# Patient Record
Sex: MALE | Race: WHITE | Employment: OTHER | URBAN - METROPOLITAN AREA
[De-identification: names, ages, dates, MRNs, and addresses within clinical notes are randomized per-mention and may not be internally consistent; named-entity substitution may affect disease eponyms.]

---

## 2017-02-27 ENCOUNTER — TELEPHONE (OUTPATIENT)
Dept: INTERNAL MEDICINE CLINIC | Age: 57
End: 2017-02-27

## 2017-03-01 RX ORDER — ATORVASTATIN CALCIUM 40 MG/1
TABLET, FILM COATED ORAL
Qty: 30 TABLET | Refills: 5 | Status: SHIPPED | OUTPATIENT
Start: 2017-03-01 | End: 2017-08-26 | Stop reason: SDUPTHER

## 2017-03-01 RX ORDER — ATORVASTATIN CALCIUM 40 MG/1
TABLET, FILM COATED ORAL
Qty: 30 TABLET | Refills: 9 | OUTPATIENT
Start: 2017-03-01

## 2017-03-12 RX ORDER — CLOPIDOGREL BISULFATE 75 MG/1
TABLET ORAL
Qty: 30 TABLET | Refills: 2 | Status: SHIPPED | OUTPATIENT
Start: 2017-03-12 | End: 2017-03-16 | Stop reason: SDUPTHER

## 2017-03-20 ENCOUNTER — OFFICE VISIT (OUTPATIENT)
Dept: INTERNAL MEDICINE CLINIC | Age: 57
End: 2017-03-20

## 2017-03-20 VITALS
RESPIRATION RATE: 16 BRPM | DIASTOLIC BLOOD PRESSURE: 74 MMHG | OXYGEN SATURATION: 96 % | HEIGHT: 69 IN | WEIGHT: 190 LBS | SYSTOLIC BLOOD PRESSURE: 106 MMHG | TEMPERATURE: 98.2 F | HEART RATE: 89 BPM | BODY MASS INDEX: 28.14 KG/M2

## 2017-03-20 DIAGNOSIS — M72.2 PLANTAR FASCIITIS, BILATERAL: ICD-10-CM

## 2017-03-20 DIAGNOSIS — J44.9 CHRONIC OBSTRUCTIVE PULMONARY DISEASE, UNSPECIFIED COPD TYPE (HCC): Primary | ICD-10-CM

## 2017-03-20 PROCEDURE — 99213 OFFICE O/P EST LOW 20 MIN: CPT | Performed by: FAMILY MEDICINE

## 2017-03-20 RX ORDER — CLOPIDOGREL BISULFATE 75 MG/1
TABLET ORAL
Qty: 30 TABLET | Refills: 5 | Status: SHIPPED | OUTPATIENT
Start: 2017-03-20 | End: 2017-11-30 | Stop reason: SDUPTHER

## 2017-03-20 ASSESSMENT — ENCOUNTER SYMPTOMS: SHORTNESS OF BREATH: 0

## 2017-03-22 ENCOUNTER — HOSPITAL ENCOUNTER (OUTPATIENT)
Dept: PULMONOLOGY | Age: 57
Discharge: HOME | End: 2017-03-22
Attending: FAMILY MEDICINE | Admitting: FAMILY MEDICINE

## 2017-03-22 DIAGNOSIS — J41.0 SIMPLE CHRONIC BRONCHITIS (HCC): ICD-10-CM

## 2017-03-22 RX ORDER — ALBUTEROL SULFATE 90 UG/1
4 AEROSOL, METERED RESPIRATORY (INHALATION) ONCE
Status: COMPLETED | OUTPATIENT
Start: 2017-03-22 | End: 2017-03-22

## 2017-03-22 RX ADMIN — ALBUTEROL SULFATE 4 PUFF: 90 AEROSOL, METERED RESPIRATORY (INHALATION) at 15:56

## 2017-06-20 ENCOUNTER — OFFICE VISIT (OUTPATIENT)
Dept: INTERNAL MEDICINE CLINIC | Age: 57
End: 2017-06-20

## 2017-06-20 VITALS
DIASTOLIC BLOOD PRESSURE: 68 MMHG | BODY MASS INDEX: 27.77 KG/M2 | HEIGHT: 70 IN | HEART RATE: 101 BPM | SYSTOLIC BLOOD PRESSURE: 102 MMHG | OXYGEN SATURATION: 96 % | TEMPERATURE: 98.3 F | RESPIRATION RATE: 18 BRPM | WEIGHT: 194 LBS

## 2017-06-20 DIAGNOSIS — L73.9 FOLLICULITIS: Primary | ICD-10-CM

## 2017-06-20 DIAGNOSIS — J42 CHRONIC BRONCHITIS, UNSPECIFIED CHRONIC BRONCHITIS TYPE (HCC): ICD-10-CM

## 2017-06-20 PROCEDURE — 99213 OFFICE O/P EST LOW 20 MIN: CPT | Performed by: FAMILY MEDICINE

## 2017-06-20 RX ORDER — CLINDAMYCIN HYDROCHLORIDE 300 MG/1
300 CAPSULE ORAL 3 TIMES DAILY
Qty: 30 CAPSULE | Refills: 0 | Status: SHIPPED | OUTPATIENT
Start: 2017-06-20 | End: 2017-06-30

## 2017-06-20 ASSESSMENT — ENCOUNTER SYMPTOMS: SHORTNESS OF BREATH: 0

## 2017-06-28 DIAGNOSIS — H69.81 EUSTACHIAN TUBE DYSFUNCTION, RIGHT: ICD-10-CM

## 2017-06-29 RX ORDER — CETIRIZINE HYDROCHLORIDE 10 MG/1
10 TABLET ORAL DAILY
Qty: 30 TABLET | Refills: 5 | Status: SHIPPED | OUTPATIENT
Start: 2017-06-29 | End: 2018-01-19 | Stop reason: SDUPTHER

## 2017-07-07 ENCOUNTER — TELEPHONE (OUTPATIENT)
Dept: INTERNAL MEDICINE CLINIC | Age: 57
End: 2017-07-07

## 2017-07-07 NOTE — TELEPHONE ENCOUNTER
Pharmacy is calling regarding the Prior Auth. On this medicationTiotropium Bromide-Olodaterol (STIOLTO RESPIMAT) 2.5-2.5 MCG/ACT AERS . They are just calling for a update.

## 2017-08-10 DIAGNOSIS — I10 ESSENTIAL HYPERTENSION: ICD-10-CM

## 2017-08-10 RX ORDER — METOPROLOL SUCCINATE 25 MG/1
TABLET, EXTENDED RELEASE ORAL
Qty: 90 TABLET | Refills: 1 | Status: SHIPPED | OUTPATIENT
Start: 2017-08-10 | End: 2018-05-11 | Stop reason: SDUPTHER

## 2017-08-29 ENCOUNTER — TELEPHONE (OUTPATIENT)
Dept: INTERNAL MEDICINE CLINIC | Age: 57
End: 2017-08-29

## 2017-08-29 DIAGNOSIS — K21.9 GASTROESOPHAGEAL REFLUX DISEASE WITHOUT ESOPHAGITIS: Primary | ICD-10-CM

## 2017-08-29 RX ORDER — ATORVASTATIN CALCIUM 40 MG/1
TABLET, FILM COATED ORAL
Qty: 30 TABLET | Refills: 4 | Status: SHIPPED | OUTPATIENT
Start: 2017-08-29 | End: 2018-01-22 | Stop reason: SDUPTHER

## 2017-08-29 RX ORDER — OMEPRAZOLE 20 MG/1
20 CAPSULE, DELAYED RELEASE ORAL DAILY
Qty: 30 CAPSULE | Refills: 2 | Status: SHIPPED | OUTPATIENT
Start: 2017-08-29 | End: 2017-11-23 | Stop reason: SDUPTHER

## 2017-09-25 RX ORDER — TRAZODONE HYDROCHLORIDE 150 MG/1
TABLET ORAL
Qty: 30 TABLET | Refills: 0 | Status: SHIPPED | OUTPATIENT
Start: 2017-09-25 | End: 2017-10-23 | Stop reason: SDUPTHER

## 2017-09-28 ENCOUNTER — OFFICE VISIT (OUTPATIENT)
Dept: INTERNAL MEDICINE CLINIC | Age: 57
End: 2017-09-28

## 2017-09-28 VITALS
HEART RATE: 82 BPM | WEIGHT: 188 LBS | DIASTOLIC BLOOD PRESSURE: 76 MMHG | OXYGEN SATURATION: 98 % | TEMPERATURE: 97.9 F | HEIGHT: 69 IN | BODY MASS INDEX: 27.85 KG/M2 | SYSTOLIC BLOOD PRESSURE: 110 MMHG | RESPIRATION RATE: 18 BRPM

## 2017-09-28 DIAGNOSIS — J44.9 CHRONIC OBSTRUCTIVE PULMONARY DISEASE, UNSPECIFIED COPD TYPE (HCC): Primary | ICD-10-CM

## 2017-09-28 DIAGNOSIS — L40.9 PSORIASIS (A TYPE OF SKIN INFLAMMATION): ICD-10-CM

## 2017-09-28 DIAGNOSIS — Z23 NEED FOR PNEUMOCOCCAL VACCINATION: ICD-10-CM

## 2017-09-28 DIAGNOSIS — Z23 NEED FOR INFLUENZA VACCINATION: ICD-10-CM

## 2017-09-28 DIAGNOSIS — M79.671 PAIN OF RIGHT HEEL: ICD-10-CM

## 2017-09-28 DIAGNOSIS — F41.1 GAD (GENERALIZED ANXIETY DISORDER): ICD-10-CM

## 2017-09-28 PROCEDURE — 99214 OFFICE O/P EST MOD 30 MIN: CPT | Performed by: FAMILY MEDICINE

## 2017-09-28 RX ORDER — ALBUTEROL SULFATE 90 UG/1
2 AEROSOL, METERED RESPIRATORY (INHALATION) EVERY 4 HOURS PRN
Qty: 1 INHALER | Refills: 11 | Status: SHIPPED | OUTPATIENT
Start: 2017-09-28 | End: 2018-05-11 | Stop reason: SDUPTHER

## 2017-09-28 RX ORDER — PAROXETINE 10 MG/1
10 TABLET, FILM COATED ORAL DAILY
Qty: 30 TABLET | Refills: 1 | Status: SHIPPED | OUTPATIENT
Start: 2017-09-28 | End: 2017-10-30 | Stop reason: SDUPTHER

## 2017-09-28 RX ORDER — EMOLLIENT COMBINATION NO.97
OINTMENT (GRAM) TOPICAL
Qty: 454 G | Refills: 5 | Status: SHIPPED | OUTPATIENT
Start: 2017-09-28 | End: 2021-01-12 | Stop reason: SDUPTHER

## 2017-09-28 ASSESSMENT — ENCOUNTER SYMPTOMS: SHORTNESS OF BREATH: 0

## 2017-10-06 ENCOUNTER — TELEPHONE (OUTPATIENT)
Dept: INTERNAL MEDICINE CLINIC | Age: 57
End: 2017-10-06

## 2017-10-12 ENCOUNTER — OFFICE VISIT (OUTPATIENT)
Dept: ORTHOPEDIC SURGERY | Age: 57
End: 2017-10-12

## 2017-10-12 VITALS
HEIGHT: 69 IN | DIASTOLIC BLOOD PRESSURE: 79 MMHG | SYSTOLIC BLOOD PRESSURE: 119 MMHG | HEART RATE: 85 BPM | BODY MASS INDEX: 27.85 KG/M2 | WEIGHT: 188.05 LBS

## 2017-10-12 DIAGNOSIS — M72.2 PLANTAR FASCIITIS OF RIGHT FOOT: ICD-10-CM

## 2017-10-12 DIAGNOSIS — M79.671 PAIN OF RIGHT HEEL: Primary | ICD-10-CM

## 2017-10-12 PROCEDURE — 99203 OFFICE O/P NEW LOW 30 MIN: CPT | Performed by: PODIATRIST

## 2017-10-12 PROCEDURE — 73650 X-RAY EXAM OF HEEL: CPT | Performed by: PODIATRIST

## 2017-10-12 PROCEDURE — L3040 FT ARCH SUPRT PREMOLD LONGIT: HCPCS | Performed by: PODIATRIST

## 2017-10-12 RX ORDER — PREDNISONE 10 MG/1
TABLET ORAL
Qty: 26 TABLET | Refills: 0 | Status: SHIPPED | OUTPATIENT
Start: 2017-10-12 | End: 2017-12-19 | Stop reason: ALTCHOICE

## 2017-10-12 NOTE — PROGRESS NOTES
We discussed the appropriate use of them. I advised the patient to use them full time in a supportive shoe that will fit them. Stretching and a short course of prednisone 10 mg on a tapered dose was prescribed. We discussed the potential adverse reactions of this medication. We discussed several different treatment options. The patient agrees with the treatment plan. I will see the patient in 3 weeks for reevaluation. Procedures    Powerstep Protech Full Length Insert     Patient was prescribed Powerstep Protech Full Length Inserts. The bilateral FEET will require stabilization / support from this semi-rigid / rigid orthosis to improve their function. The orthosis will assist in protecting the affected area, provide functional support and facilitate healing. The patient was educated and fit by a healthcare professional with expert knowledge and specialization in brace application while under the direct supervision of the treating physician. Verbal and written instructions for the use of and application of this item were provided. They were instructed to contact the office immediately should the brace result in increased pain, decreased sensation, increased swelling or worsening of the condition.

## 2017-10-30 ENCOUNTER — OFFICE VISIT (OUTPATIENT)
Dept: INTERNAL MEDICINE CLINIC | Age: 57
End: 2017-10-30

## 2017-10-30 VITALS
DIASTOLIC BLOOD PRESSURE: 70 MMHG | TEMPERATURE: 97.8 F | SYSTOLIC BLOOD PRESSURE: 120 MMHG | RESPIRATION RATE: 18 BRPM | OXYGEN SATURATION: 97 % | BODY MASS INDEX: 27.55 KG/M2 | HEART RATE: 69 BPM | HEIGHT: 69 IN | WEIGHT: 186 LBS

## 2017-10-30 DIAGNOSIS — F41.1 GAD (GENERALIZED ANXIETY DISORDER): Primary | ICD-10-CM

## 2017-10-30 PROCEDURE — G8427 DOCREV CUR MEDS BY ELIG CLIN: HCPCS | Performed by: FAMILY MEDICINE

## 2017-10-30 PROCEDURE — G8417 CALC BMI ABV UP PARAM F/U: HCPCS | Performed by: FAMILY MEDICINE

## 2017-10-30 PROCEDURE — 99213 OFFICE O/P EST LOW 20 MIN: CPT | Performed by: FAMILY MEDICINE

## 2017-10-30 PROCEDURE — 4004F PT TOBACCO SCREEN RCVD TLK: CPT | Performed by: FAMILY MEDICINE

## 2017-10-30 PROCEDURE — G8598 ASA/ANTIPLAT THER USED: HCPCS | Performed by: FAMILY MEDICINE

## 2017-10-30 PROCEDURE — G8484 FLU IMMUNIZE NO ADMIN: HCPCS | Performed by: FAMILY MEDICINE

## 2017-10-30 PROCEDURE — 3017F COLORECTAL CA SCREEN DOC REV: CPT | Performed by: FAMILY MEDICINE

## 2017-10-30 RX ORDER — PAROXETINE 30 MG/1
30 TABLET, FILM COATED ORAL DAILY
Qty: 30 TABLET | Refills: 1 | Status: SHIPPED | OUTPATIENT
Start: 2017-10-30 | End: 2017-12-26 | Stop reason: ALTCHOICE

## 2017-10-30 NOTE — PATIENT INSTRUCTIONS

## 2017-11-21 ENCOUNTER — OFFICE VISIT (OUTPATIENT)
Dept: INTERNAL MEDICINE CLINIC | Age: 57
End: 2017-11-21

## 2017-11-21 VITALS
DIASTOLIC BLOOD PRESSURE: 80 MMHG | SYSTOLIC BLOOD PRESSURE: 110 MMHG | HEART RATE: 78 BPM | WEIGHT: 189 LBS | RESPIRATION RATE: 20 BRPM | TEMPERATURE: 97.7 F | BODY MASS INDEX: 27.99 KG/M2 | HEIGHT: 69 IN | OXYGEN SATURATION: 98 %

## 2017-11-21 DIAGNOSIS — G47.00 INSOMNIA, UNSPECIFIED TYPE: ICD-10-CM

## 2017-11-21 DIAGNOSIS — L85.3 DRY SKIN DERMATITIS: ICD-10-CM

## 2017-11-21 DIAGNOSIS — F41.9 ANXIETY: Primary | ICD-10-CM

## 2017-11-21 PROCEDURE — 99214 OFFICE O/P EST MOD 30 MIN: CPT | Performed by: FAMILY MEDICINE

## 2017-11-21 PROCEDURE — 3017F COLORECTAL CA SCREEN DOC REV: CPT | Performed by: FAMILY MEDICINE

## 2017-11-21 PROCEDURE — G8427 DOCREV CUR MEDS BY ELIG CLIN: HCPCS | Performed by: FAMILY MEDICINE

## 2017-11-21 PROCEDURE — G8484 FLU IMMUNIZE NO ADMIN: HCPCS | Performed by: FAMILY MEDICINE

## 2017-11-21 PROCEDURE — G8417 CALC BMI ABV UP PARAM F/U: HCPCS | Performed by: FAMILY MEDICINE

## 2017-11-21 PROCEDURE — G8598 ASA/ANTIPLAT THER USED: HCPCS | Performed by: FAMILY MEDICINE

## 2017-11-21 PROCEDURE — 4004F PT TOBACCO SCREEN RCVD TLK: CPT | Performed by: FAMILY MEDICINE

## 2017-11-21 NOTE — PATIENT INSTRUCTIONS
Patient Education        Learning About Sleeping Well  What does sleeping well mean? Sleeping well means getting enough sleep. How much sleep is enough varies among people. The number of hours you sleep is not as important as how you feel when you wake up. If you do not feel refreshed, you probably need more sleep. Another sign of not getting enough sleep is feeling tired during the day. The average total nightly sleep time is 7½ to 8 hours. Healthy adults may need a little more or a little less than this. Why is getting enough sleep important? Getting enough quality sleep is a basic part of good health. When your sleep suffers, your mood and your thoughts can suffer too. You may find yourself feeling more grumpy or stressed. Not getting enough sleep also can lead to serious problems, including injury, accidents, anxiety, and depression. What might cause poor sleeping? Many things can cause sleep problems, including:  · Stress. Stress can be caused by fear about a single event, such as giving a speech. Or you may have ongoing stress, such as worry about work or school. · Depression, anxiety, and other mental or emotional conditions. · Changes in your sleep habits or surroundings. This includes changes that happen where you sleep, such as noise, light, or sleeping in a different bed. It also includes changes in your sleep pattern, such as having jet lag or working a late shift. · Health problems, such as pain, breathing problems, and restless legs syndrome. · Lack of regular exercise. How can you help yourself? Here are some tips that may help you sleep more soundly and wake up feeling more refreshed. Your sleeping area  · Use your bedroom only for sleeping and sex. A bit of light reading may help you fall asleep. But if it doesn't, do your reading elsewhere in the house. Don't watch TV in bed.   · Be sure your bed is big enough to stretch out comfortably, especially if you have a sleep 11.3  © 2006-2017 [employer]. Care instructions adapted under license by [facility] ([facility]). If you have questions about a medical condition or this instruction, always ask your healthcare professional. [address] any warranty or liability for your use of this information. Patient Education        Learning About Anxiety Disorders  What are anxiety disorders? Anxiety disorders are a type of medical problem. They cause severe anxiety. When you feel anxious, you feel that something bad is about to happen. This feeling interferes with your life. These disorders include:  · Generalized anxiety disorder. You feel worried and stressed about many everyday events and activities. This goes on for several months and disrupts your life on most days. · Panic disorder. You have repeated panic attacks. A panic attack is a sudden, intense fear or anxiety. It may make you feel short of breath. Your heart may pound. · Social anxiety disorder. You feel very anxious about what you will say or do in front of people. For example, you may be scared to talk or eat in public. This problem affects your daily life. · Phobias. You are very scared of a specific object, situation, or activity. For example, you may fear spiders, high places, or small spaces. What are the symptoms? Generalized anxiety disorder  Symptoms may include:  · Feeling worried and stressed about many things almost every day. · Feeling tired or irritable. You may have a hard time concentrating. · Having headaches or muscle aches. · Having a hard time getting to sleep or staying asleep. Panic disorder  You may have repeated panic attacks when there is no reason for feeling afraid. You may change your daily activities because you worry that you will have another attack. Symptoms may include:  · Intense fear, terror, or anxiety. · Trouble breathing or very fast breathing. · Chest pain or tightness.   · A heartbeat that races or is not regular. Social anxiety disorder  Symptoms may include:  · Fear about a social situation, such as eating in front of others or speaking in public. You may worry a lot. Or you may be afraid that something bad will happen. · Anxiety that can cause you to blush, sweat, and feel shaky. · A heartbeat that is faster than normal.  · A hard time focusing. Phobias  Symptoms may include:  · More fear than most people of being around an object, being in a situation, or doing an activity. You might also be stressed about the chance of being around the thing you fear. · Worry about losing control, panicking, fainting, or having physical symptoms like a faster heartbeat when you are around the situation or object. How are these disorders treated? Anxiety disorders can be treated with medicines or counseling. A combination of both may be used. Medicines may include:  · Antidepressants. These may help your symptoms by keeping chemicals in your brain in balance. · Benzodiazepines. These may give you short-term relief of your symptoms. Some people use cognitive-behavioral therapy. A therapist helps you learn to change stressful or bad thoughts into helpful thoughts. Lead a healthy lifestyle  A healthy lifestyle may help you feel better. · Get at least 30 minutes of exercise on most days of the week. Walking is a good choice. · Eat a healthy diet. Include fruits, vegetables, lean proteins, and whole grains in your diet each day. · Try to go to bed at the same time every night. Try for 8 hours of sleep a night. · Find ways to manage stress. Try relaxation exercises. · Avoid alcohol and illegal drugs. Follow-up care is a key part of your treatment and safety. Be sure to make and go to all appointments, and call your doctor if you are having problems. It's also a good idea to know your test results and keep a list of the medicines you take. Where can you learn more?   Go to

## 2017-12-06 RX ORDER — OMEPRAZOLE 20 MG/1
CAPSULE, DELAYED RELEASE ORAL
Qty: 30 CAPSULE | Refills: 5 | Status: SHIPPED | OUTPATIENT
Start: 2017-12-06 | End: 2018-05-11 | Stop reason: SDUPTHER

## 2017-12-11 ENCOUNTER — TELEPHONE (OUTPATIENT)
Dept: INTERNAL MEDICINE CLINIC | Age: 57
End: 2017-12-11

## 2017-12-11 DIAGNOSIS — G89.29 CHRONIC LOW BACK PAIN, UNSPECIFIED BACK PAIN LATERALITY, WITH SCIATICA PRESENCE UNSPECIFIED: Primary | ICD-10-CM

## 2017-12-11 DIAGNOSIS — M54.5 CHRONIC LOW BACK PAIN, UNSPECIFIED BACK PAIN LATERALITY, WITH SCIATICA PRESENCE UNSPECIFIED: Primary | ICD-10-CM

## 2017-12-11 NOTE — TELEPHONE ENCOUNTER
Patient called and stated that his lower back has been bothering him and he would like to know if something can be prescribed.

## 2017-12-15 RX ORDER — TRAMADOL HYDROCHLORIDE 50 MG/1
50 TABLET ORAL EVERY 6 HOURS PRN
Qty: 28 TABLET | Refills: 0 | Status: SHIPPED | OUTPATIENT
Start: 2017-12-15 | End: 2017-12-22

## 2017-12-26 ENCOUNTER — OFFICE VISIT (OUTPATIENT)
Dept: PSYCHIATRY | Age: 57
End: 2017-12-26

## 2017-12-26 VITALS
SYSTOLIC BLOOD PRESSURE: 128 MMHG | HEIGHT: 69 IN | HEART RATE: 84 BPM | BODY MASS INDEX: 28.61 KG/M2 | WEIGHT: 193.2 LBS | DIASTOLIC BLOOD PRESSURE: 82 MMHG

## 2017-12-26 DIAGNOSIS — Z13.1 ENCOUNTER FOR SCREENING FOR DIABETES MELLITUS: Primary | ICD-10-CM

## 2017-12-26 DIAGNOSIS — F32.A DEPRESSION, UNSPECIFIED DEPRESSION TYPE: ICD-10-CM

## 2017-12-26 DIAGNOSIS — F41.9 ANXIETY DISORDER, UNSPECIFIED TYPE: ICD-10-CM

## 2017-12-26 DIAGNOSIS — Z13.220 ENCOUNTER FOR SCREENING FOR LIPID DISORDER: ICD-10-CM

## 2017-12-26 DIAGNOSIS — G31.84 MILD NEUROCOGNITIVE DISORDER: ICD-10-CM

## 2017-12-26 DIAGNOSIS — Z13.1 ENCOUNTER FOR SCREENING FOR DIABETES MELLITUS: ICD-10-CM

## 2017-12-26 LAB
CHOLESTEROL, TOTAL: 104 MG/DL (ref 0–199)
HDLC SERPL-MCNC: 63 MG/DL (ref 40–60)
LDL CHOLESTEROL CALCULATED: 13 MG/DL
TRIGL SERPL-MCNC: 142 MG/DL (ref 0–150)
VLDLC SERPL CALC-MCNC: 28 MG/DL

## 2017-12-26 PROCEDURE — 99214 OFFICE O/P EST MOD 30 MIN: CPT | Performed by: PSYCHIATRY & NEUROLOGY

## 2017-12-26 RX ORDER — PAROXETINE 10 MG/1
TABLET, FILM COATED ORAL
Qty: 21 TABLET | Refills: 0 | Status: SHIPPED | OUTPATIENT
Start: 2017-12-26 | End: 2018-01-25 | Stop reason: ALTCHOICE

## 2017-12-26 RX ORDER — QUETIAPINE FUMARATE 100 MG/1
TABLET, FILM COATED ORAL
Qty: 60 TABLET | Refills: 0 | Status: SHIPPED | OUTPATIENT
Start: 2017-12-26 | End: 2018-01-25 | Stop reason: DRUGHIGH

## 2017-12-26 ASSESSMENT — ANXIETY QUESTIONNAIRES
2. NOT BEING ABLE TO STOP OR CONTROL WORRYING: 3-NEARLY EVERY DAY
5. BEING SO RESTLESS THAT IT IS HARD TO SIT STILL: 3-NEARLY EVERY DAY
4. TROUBLE RELAXING: 3-NEARLY EVERY DAY
7. FEELING AFRAID AS IF SOMETHING AWFUL MIGHT HAPPEN: 2-OVER HALF THE DAYS
1. FEELING NERVOUS, ANXIOUS, OR ON EDGE: 0-NOT AT ALL SURE
6. BECOMING EASILY ANNOYED OR IRRITABLE: 3-NEARLY EVERY DAY
3. WORRYING TOO MUCH ABOUT DIFFERENT THINGS: 3-NEARLY EVERY DAY
GAD7 TOTAL SCORE: 17

## 2017-12-26 ASSESSMENT — PATIENT HEALTH QUESTIONNAIRE - PHQ9
9. THOUGHTS THAT YOU WOULD BE BETTER OFF DEAD, OR OF HURTING YOURSELF: 1
2. FEELING DOWN, DEPRESSED OR HOPELESS: 1
SUM OF ALL RESPONSES TO PHQ QUESTIONS 1-9: 17
SUM OF ALL RESPONSES TO PHQ9 QUESTIONS 1 & 2: 3
5. POOR APPETITE OR OVEREATING: 0
4. FEELING TIRED OR HAVING LITTLE ENERGY: 3
6. FEELING BAD ABOUT YOURSELF - OR THAT YOU ARE A FAILURE OR HAVE LET YOURSELF OR YOUR FAMILY DOWN: 1
1. LITTLE INTEREST OR PLEASURE IN DOING THINGS: 2
10. IF YOU CHECKED OFF ANY PROBLEMS, HOW DIFFICULT HAVE THESE PROBLEMS MADE IT FOR YOU TO DO YOUR WORK, TAKE CARE OF THINGS AT HOME, OR GET ALONG WITH OTHER PEOPLE: 1
7. TROUBLE CONCENTRATING ON THINGS, SUCH AS READING THE NEWSPAPER OR WATCHING TELEVISION: 3
8. MOVING OR SPEAKING SO SLOWLY THAT OTHER PEOPLE COULD HAVE NOTICED. OR THE OPPOSITE, BEING SO FIGETY OR RESTLESS THAT YOU HAVE BEEN MOVING AROUND A LOT MORE THAN USUAL: 3
3. TROUBLE FALLING OR STAYING ASLEEP: 3

## 2017-12-26 NOTE — PATIENT INSTRUCTIONS
1. Stop taking trazodone  2. Start Quetiapine (Seroquel) as follows:      - 1/2 tablet at night for 3 days then        1 tablet at night for 3 days then        1.5 tablet at night for 3 days then        2 tablets at night   3. Stop taking the 30mg paxil tablets  4.  Fill 10mg Paxil tablets from your pharmacy and reduce them as follows:      - 2 tablets at night for 1 week then       1 tablet at night for 1 week, then stop    Outpatient Psychiatric Services:    [facility] ([address]): [address] (various offices): [phone]  [facility] (various offices): [phone]

## 2017-12-26 NOTE — PROGRESS NOTES
cognitive disorder, chronic medical conditions, male. Pt is low risk for future dangerousness to self or others, and is safe for continued outpatient care.     ____________________________________________________________________________    HPI:   Context: Pt is a 57 yo M with hx of depression, anxiety presenting as a referral from Dr. Saeed Jaramillo. Pt has also had a rt hemispheric ischemic CVA involving the MCA and possibly some PCA circulation in 2013 including some rt frontal lobe involvement. associated symptoms:   Main complaint is insomnia. He persistently and chronically will only get about 2 hrs of sleep per night before waking and not being able to sleep. He has a poor description regarding why he can't sleep, but does indicate some racing thoughts (although can't specify what he thinks about). He also expresses sx of excessive talkativeness, restlessness and inability to sit still (\"i'm up and down and constantly moving\"), irritability, poor concentration. He reports low energy, low motivation to do things, anhedonia, depressed mood most days. He worries about his finances, his girlfriend, and being able to control his own moods and behaviors. He occasionally will feel it is better if he wasn't around and he feels to be a burden on others, particularly his girlfriend, but he denies any suicidal plan or intent, no thoughts about wanting to harm himself. He denies grandiosity, hallucinations, hypersexuality, or impulsive spending. modifying factors:   He was a alcoholic for 12 yrs until his CVA in 2013. Has a good relationship with his family and communicates with them often. Main stressor seems to be largely the relationship with his girlfriend. They fight often, and he finds himself impulsively getting angry easily with her. He's had partial relief from trazodone 150mg for sleep, but this no longer works at all so he stopped taking this over 1 month ago.    Paxil has not helped and has not made his symptoms worse. He also had a bowel obstruction at that time that required surgery and resection, struggles with chronic back pain d/t DDD since the 1990s. Timing: He's dealt with the issue of insomnia chronically for 10 yrs.   Duration: it seems his issues worsened after his CVA in 2013, however had some symptoms prior to this likely attention issues, restlessness, and poor sleep    severity: severe    Outside records:   from the chart I'm not seeing notes about the related encounter to his CVA in [facility] and any post-stroke care or neuropsych testing    ROS:   GEN: no fevers, +fatigue HEENT: no headache, no vision or hearing prob, no sore throat CV: no cp, no palpitations, no edema RESP: no dyspnea : no dysuria, no hematuria MSK: no extremity or joint pain GI: no N/V/D Skin: no rashes NEURO: no headache, no numbness/weakness ENDO: no tremors, no wt changes    Past Psychiatric History:   Hosp: none  Diagnoses: depression, anxiety  Med trials: paxil up to 30mg, trazodone 150mg, belsomra (although pt doesn't recall if he took)  Outpt: no prior psych or therapy  NSSI: denies  Suicide Attempts: denies    Past Medical History:   Diagnosis Date    Alcohol abuse, in remission     for two months    Arthritis     rt hip, hands    Asthma     Back pain     Cerebral artery occlusion with cerebral infarction ([address]     CVA (cerebral infarction) 4/28/13    affected L side     Hyperlipidemia     Hypertension     Insomnia     MRSA carrier     very long time ago, more than one year    Paresthesia of left leg     chronic problem    Psoriasis (a type of skin inflammation)     Smoker     1.5 ppd     Past Surgical History:   Procedure Laterality Date    ABDOMEN SURGERY      APPENDECTOMY      BACK SURGERY      maybe a fusion, L4 L5 herniated disc    CHOLECYSTECTOMY, LAPAROSCOPIC  2/13/2015    LAPAROSCOPIC CHOLECYSTECTOMY WITH CHOLANGIOGRAM, LYSIS OF    SMALL INTESTINE SURGERY  4/24/13    for ischemic bowel    TONSILLECTOMY       Social History     Social History    Marital status:      Spouse name: N/A    Number of children: N/A    Years of education: 8     Occupational History    unemployed      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 Reports childhood was good, parents  when he was a baby, but he maintained and good relationship with both of them and still communicates with them frequently. He has 3 brothers, 1 sister - has a good relationship with them     Dropped out of school 10th grade since he was doing poorly, struggled with math, and wanted to seek employment. Family History   Problem Relation Age of Onset    Arthritis Mother     Mental Illness Neg Hx      No Known Allergies  Current Outpatient Prescriptions on File Prior to Visit   Medication Sig Dispense Refill    predniSONE (DELTASONE) 10 MG tablet Take 1 tablet by mouth daily for 10 doses Take 5 pills a day for 2 days then,  4 pills a day for 2 days then,  3 pills a day for 2 days then,  2 pills a day for 2 days then,  1 pill a day for 2 days.  30 tablet 0    cyclobenzaprine (FLEXERIL) 10 MG tablet Take 1 tablet by mouth 3 times daily as needed for Muscle spasms 30 tablet 0    clopidogrel (PLAVIX) 75 MG tablet TAKE ONE TABLET BY MOUTH DAILY 30 tablet 5    omeprazole (PRILOSEC) 20 MG delayed release capsule TAKE ONE CAPSULE BY MOUTH DAILY 30 capsule 5    umeclidinium-vilanterol (ANORO ELLIPTA) 62.5-25 MCG/INH AEPB inhaler Inhale 1 puff into the lungs daily 60 each 5    albuterol sulfate HFA (PROVENTIL HFA) 108 (90 Base) MCG/ACT inhaler Inhale 2 puffs into the lungs every 4 hours as needed for Wheezing or Shortness of Breath (Space out to every 6 hours as symptoms improve) Space out to every 6 hours as symptoms improve. 1 Inhaler 11    Emollient (AQUAPHILIC) OINT Apply daily to dry skin. 454 g 5    atorvastatin (LIPITOR) 40 MG tablet TAKE ONE TABLET BY MOUTH ONCE NIGHTLY 30 tablet 4    metoprolol succinate (TOPROL XL) 25 MG extended release tablet TAKE ONE-HALF TABLET BY MOUTH DAILY 90 tablet 1    cetirizine (ZYRTEC ALLERGY) 10 MG tablet Take 1 tablet by mouth daily 30 tablet 5    ASPIRIN LOW DOSE 81 MG chewable tablet CHEW ONE TABLET BY MOUTH DAILY 90 tablet 8    lisinopril (PRINIVIL;ZESTRIL) 20 MG tablet Take 20 mg by mouth daily       No current facility-administered medications on file prior to visit. OBJECTIVE:  Vitals:    12/26/17 0823   BP: 128/82   Site: Right Arm   Position: Sitting   Cuff Size: Large Adult   Pulse: 84   Weight: 193 lb 3.2 oz (87.6 kg)   Height: 5' 9\" (1.753 m)     PHQ Scores 12/26/2017   PHQ2 Score 3   PHQ9 Score 17     Interpretation of Total Score Depression Severity: 1-4 = Minimal depression, 5-9 = Mild depression, 10-14 = Moderate depression, 15-19 = Moderately severe depression, 20-27 = Severe depression    HILDA 7 SCORE 12/26/2017   HILDA-7 Total Score 17     Interpretation of HILDA-7 score: 5-9 = mild anxiety, 10-14 = moderate anxiety, 15+ = severe anxiety. Recommend referral to behavioral health for scores 10 or greater.     MOCA: 21/30 (Visuospatial/executive 4/5, naming 3/3, attention 2/6, language 1/3, abstraction 0/2, delayed recall 4/5, orientation 6/6, +1 for education level)     MSE:   Appearance    alert, cooperative, somewhat malodorous but appears groomed and appropriately dressed  Motor: Normal strength and tone, No abnormal movements, tics or mannerisms. Speech    spontaneous, normal rate and normal volume  Language    0 - no aphasia, normal  Mood/Affect    Depressed / mostly constricted, congruent to mood, near tears at times  Thought Process    linear, goal directed and coherent  Thought Content   future oriented, no suicidal ideation, no homicidal ideation  Associations    logical connections  Attention/Concentration    intact  Orientation    oriented to person, place, time, and general circumstances  Memory    recent and remote grossly intact  Fund of Knowledge    intact  Insight/Judgement    Poor / Intact    Labs:     Lab Results   Component Value Date    CHOL 92 05/02/2013    CHOL 60 04/29/2013     Lab Results   Component Value Date    TRIG 222 (H) 05/02/2013    TRIG 139 04/29/2013    TRIG 103 04/26/2013     Lab Results   Component Value Date    HDL 21 (L) 05/02/2013    HDL 14 (L) 04/29/2013     Lab Results   Component Value Date    LDLCALC 27 05/02/2013    LDLCALC 18 04/29/2013     Lab Results   Component Value Date    LABVLDL 44 05/02/2013    LABVLDL 28 04/29/2013     No results found for: CHOLHDLRATIO    Lab Results   Component Value Date    LABA1C 5.9 04/17/2013     Lab Results   Component Value Date    .6 04/17/2013     No results found for: TSHFT4, TSH    No results found for: PUXOTIGA20  No results found for: FOLATE    No results found for: [id number]    UDS: none on file    Imaging:   MRI Brain 4/27/2013:      FINDINGS:        There is a nonhemorrhagic acute infarction, right occipital lobe   inferior lateral aspect, right parietal lobe above the sylvian   fissure and with small foci of involvement in the inferior right   temporal lobe. These are seen with diffusion restriction, series   4, images 9 through 17. In addition punctate foci of lacunar type   infarction are seen in the right frontal lobe deep white matter,   image 17 and 18. There is no midline shift with only minimal edema   present.  This does result in mild effacement

## 2017-12-27 LAB
ESTIMATED AVERAGE GLUCOSE: 122.6 MG/DL
HBA1C MFR BLD: 5.9 %

## 2018-01-09 ENCOUNTER — TELEPHONE (OUTPATIENT)
Dept: PSYCHIATRY | Age: 58
End: 2018-01-09

## 2018-01-09 NOTE — TELEPHONE ENCOUNTER
Last Ov: 12/26/2017    Last Rf: 12/26/2017    Paxil 10 mg tab    Take two tabs daily for one week, then take one tab daily for one week then stop. Do you want me to refill this medication?

## 2018-01-19 DIAGNOSIS — H69.81 EUSTACHIAN TUBE DYSFUNCTION, RIGHT: ICD-10-CM

## 2018-01-22 ENCOUNTER — TELEPHONE (OUTPATIENT)
Dept: INTERNAL MEDICINE CLINIC | Age: 58
End: 2018-01-22

## 2018-01-22 DIAGNOSIS — H69.81 EUSTACHIAN TUBE DYSFUNCTION, RIGHT: ICD-10-CM

## 2018-01-22 RX ORDER — CETIRIZINE HYDROCHLORIDE 10 MG/1
TABLET ORAL
Qty: 30 TABLET | Refills: 11 | Status: SHIPPED | OUTPATIENT
Start: 2018-01-22 | End: 2018-05-11 | Stop reason: SDUPTHER

## 2018-01-22 RX ORDER — CETIRIZINE HYDROCHLORIDE 10 MG/1
TABLET ORAL
Qty: 30 TABLET | Refills: 4 | Status: SHIPPED | OUTPATIENT
Start: 2018-01-22 | End: 2018-01-22 | Stop reason: SDUPTHER

## 2018-01-22 RX ORDER — ATORVASTATIN CALCIUM 40 MG/1
TABLET, FILM COATED ORAL
Qty: 30 TABLET | Refills: 2 | Status: SHIPPED | OUTPATIENT
Start: 2018-01-22 | End: 2018-04-22 | Stop reason: SDUPTHER

## 2018-01-23 ENCOUNTER — TELEPHONE (OUTPATIENT)
Dept: PSYCHIATRY | Age: 58
End: 2018-01-23

## 2018-01-23 NOTE — TELEPHONE ENCOUNTER
Last OV : 12/26/2017    Last RF : 12/27/2017    Seroquel 100 mg tab     Take one-half tab by mouth every night at bedtime for 3 days, take one tab by mouth every night at bedtime for 3 days, take half tab by mouth every night    Is this ok to refill?

## 2018-01-25 ENCOUNTER — OFFICE VISIT (OUTPATIENT)
Dept: PSYCHIATRY | Age: 58
End: 2018-01-25

## 2018-01-25 VITALS
HEART RATE: 89 BPM | BODY MASS INDEX: 28.47 KG/M2 | OXYGEN SATURATION: 99 % | HEIGHT: 69 IN | SYSTOLIC BLOOD PRESSURE: 138 MMHG | WEIGHT: 192.2 LBS | DIASTOLIC BLOOD PRESSURE: 78 MMHG

## 2018-01-25 DIAGNOSIS — F31.62 BIPOLAR DISORDER, CURRENT EPISODE MIXED, MODERATE (HCC): Primary | ICD-10-CM

## 2018-01-25 DIAGNOSIS — F10.21 ALCOHOL USE DISORDER, SEVERE, IN SUSTAINED REMISSION (HCC): ICD-10-CM

## 2018-01-25 PROCEDURE — 99214 OFFICE O/P EST MOD 30 MIN: CPT | Performed by: PSYCHIATRY & NEUROLOGY

## 2018-01-25 RX ORDER — QUETIAPINE 150 MG/1
TABLET, FILM COATED, EXTENDED RELEASE ORAL
Qty: 60 TABLET | Refills: 0 | Status: SHIPPED | OUTPATIENT
Start: 2018-01-25 | End: 2018-02-20 | Stop reason: DRUGHIGH

## 2018-01-25 NOTE — PROGRESS NOTES
PSYCHIATRY PROGRESS NOTE    [facility]  1960  01/25/18  Face to Face time: 25 min  PCP: Dr. Nicol Connolly     A:   57 yo M with depression, anxiety, and what now more consistently appears to be symptoms of erin. D/t poor adherence with quetiapine regimen, partially influenced by it being excessive sedating, he has not shown improvement in his mental status since last visit. His cognition may be better once his bipolar disorder is under better control.      1. Bipolar disorder 2/2 to CVA MRE mixed  2. R/o Mild neurocognitive disorder   3. Alcohol use disorder, moderate, in remission  4. Tobacco use disorder, in remission  5. 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 Continue quetiapine 100mg qhs for 3 nights. 2. Will then switch to seroquel XR to help mitigate sedative effects and to titrate up more rapidly - start 150mg qhs for 3 nights, then increase to 300mg qhs. Discussed r/b/se. 3. He has established with Dr. Diana Ward for primary care. 4. Consider TSH, B12, folate at next visit.       Medication Monitoring:    - OARRS reviewed, no issues noted     - baseline fasting lipids and HgA1c checked 12/26/2017. Normal lipids, but may be pre-diabetic. Will continue to monitor with use of quetiapine. Follow-up: RTC in 2 weeks. Safety: RF include mood disorder, anxiety, cognitive disorder, chronic medical conditions, male. It is questionable if he has had dangerousness to others given his current restraining order, suggesting pt may have at least a moderate risk for future dangerousness to others, but low risk to self, he is currently safe for continued outpatient care as he does not represent an imminent risk at this time.     _____________________________________________________    CC:   Chief Complaint   Patient presents with    Depression    Anxiety     S: Pt reports ongoing issues with depression, high anxiety, and impulsivity. It is more clear today that he is demonstrating symptoms of erin. Reports little to no sleep if not taking quetiapine (which he has been poorly adherent to, taking 100mg qhs only a couple times per week), racing thoughts, excessive speech, distractibilty, high energy which he recognizes is in excess. This is mixed with depressive symptoms including hopelessness, anhedonia, and guilt. Others tell him he needs to slow down and he is talking too much. He continues to have relationship issues with his girlfriend, now to the point he has a restraining order against him that was filed 2 weeks ago and he has essentially given up on the idea of him and his girlfriend getting back together. This is his main stressor. He does not disclose if he was violent towards her, states the police were called and he wasn't doing anything except trying to get his things back from her place. Denies any drug use. Last used alcohol about 2 weeks ago and only had a couple beers. Denies any relapses into alcohol use. ROS: no headaches, vision problems, dysuria, abd pain, chest pain or SOB. Reports +pain in his rt leg 2/2 sciatica.      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     Brief Psych Hx:  Diagnoses: depression, anxiety  Med trials: paxil up to 30mg, trazodone 150mg, belsomra (although pt doesn't recall if he took)  Outpt: no prior psych or therapy    Current Outpatient Prescriptions   Medication Sig Dispense Refill    QUEtiapine (SEROQUEL XR) 150 MG TB24 extended release tablet Take one tablet PO qhs for 3 days, then increase to 2 tablets PO qhs 60 tablet 0    atorvastatin (LIPITOR) 40 MG tablet TAKE ONE TABLET BY MOUTH ONCE NIGHTLY 30 tablet 2    cetirizine (ZYRTEC) 10 MG tablet TAKE ONE TABLET BY MOUTH DAILY 30 tablet 11    cyclobenzaprine (FLEXERIL) 10 MG tablet Take 1 tablet by mouth 3 times daily as needed for Muscle

## 2018-01-29 ENCOUNTER — OFFICE VISIT (OUTPATIENT)
Dept: INTERNAL MEDICINE CLINIC | Age: 58
End: 2018-01-29

## 2018-01-29 VITALS
BODY MASS INDEX: 28.44 KG/M2 | OXYGEN SATURATION: 98 % | SYSTOLIC BLOOD PRESSURE: 120 MMHG | HEART RATE: 74 BPM | TEMPERATURE: 97.9 F | HEIGHT: 69 IN | WEIGHT: 192 LBS | DIASTOLIC BLOOD PRESSURE: 70 MMHG

## 2018-01-29 DIAGNOSIS — S93.491A SPRAIN OF ANTERIOR TALOFIBULAR LIGAMENT OF RIGHT ANKLE, INITIAL ENCOUNTER: ICD-10-CM

## 2018-01-29 DIAGNOSIS — J41.8 MIXED SIMPLE AND MUCOPURULENT CHRONIC BRONCHITIS (HCC): ICD-10-CM

## 2018-01-29 DIAGNOSIS — Z11.59 NEED FOR HEPATITIS C SCREENING TEST: ICD-10-CM

## 2018-01-29 DIAGNOSIS — I10 ESSENTIAL HYPERTENSION: Primary | ICD-10-CM

## 2018-01-29 LAB
A/G RATIO: 2 (ref 1.1–2.2)
ALBUMIN SERPL-MCNC: 4.7 G/DL (ref 3.4–5)
ALP BLD-CCNC: 87 U/L (ref 40–129)
ALT SERPL-CCNC: 34 U/L (ref 10–40)
ANION GAP SERPL CALCULATED.3IONS-SCNC: 13 MMOL/L (ref 3–16)
AST SERPL-CCNC: 26 U/L (ref 15–37)
BILIRUB SERPL-MCNC: 0.4 MG/DL (ref 0–1)
BUN BLDV-MCNC: 12 MG/DL (ref 7–20)
CALCIUM SERPL-MCNC: 9.8 MG/DL (ref 8.3–10.6)
CHLORIDE BLD-SCNC: 101 MMOL/L (ref 99–110)
CO2: 29 MMOL/L (ref 21–32)
CREAT SERPL-MCNC: 1 MG/DL (ref 0.9–1.3)
GFR AFRICAN AMERICAN: >60
GFR NON-AFRICAN AMERICAN: >60
GLOBULIN: 2.3 G/DL
GLUCOSE BLD-MCNC: 97 MG/DL (ref 70–99)
HEPATITIS C ANTIBODY INTERPRETATION: NORMAL
POTASSIUM SERPL-SCNC: 4.6 MMOL/L (ref 3.5–5.1)
SODIUM BLD-SCNC: 143 MMOL/L (ref 136–145)
TOTAL PROTEIN: 7 G/DL (ref 6.4–8.2)

## 2018-01-29 PROCEDURE — 1036F TOBACCO NON-USER: CPT | Performed by: INTERNAL MEDICINE

## 2018-01-29 PROCEDURE — 3023F SPIROM DOC REV: CPT | Performed by: INTERNAL MEDICINE

## 2018-01-29 PROCEDURE — G8598 ASA/ANTIPLAT THER USED: HCPCS | Performed by: INTERNAL MEDICINE

## 2018-01-29 PROCEDURE — 3017F COLORECTAL CA SCREEN DOC REV: CPT | Performed by: INTERNAL MEDICINE

## 2018-01-29 PROCEDURE — G8417 CALC BMI ABV UP PARAM F/U: HCPCS | Performed by: INTERNAL MEDICINE

## 2018-01-29 PROCEDURE — 99214 OFFICE O/P EST MOD 30 MIN: CPT | Performed by: INTERNAL MEDICINE

## 2018-01-29 PROCEDURE — G8484 FLU IMMUNIZE NO ADMIN: HCPCS | Performed by: INTERNAL MEDICINE

## 2018-01-29 PROCEDURE — G8427 DOCREV CUR MEDS BY ELIG CLIN: HCPCS | Performed by: INTERNAL MEDICINE

## 2018-01-29 PROCEDURE — G8926 SPIRO NO PERF OR DOC: HCPCS | Performed by: INTERNAL MEDICINE

## 2018-01-29 RX ORDER — TIZANIDINE 4 MG/1
4 TABLET ORAL 3 TIMES DAILY
Qty: 30 TABLET | Refills: 1 | Status: SHIPPED | OUTPATIENT
Start: 2018-01-29 | End: 2019-02-27 | Stop reason: SDUPTHER

## 2018-01-29 ASSESSMENT — ENCOUNTER SYMPTOMS
COUGH: 0
EYE REDNESS: 0
EYE PAIN: 0
CHOKING: 0
SHORTNESS OF BREATH: 1
RHINORRHEA: 1

## 2018-01-30 ENCOUNTER — HOSPITAL ENCOUNTER (OUTPATIENT)
Dept: OTHER | Age: 58
Discharge: HOME | End: 2018-01-31
Attending: INTERNAL MEDICINE | Admitting: INTERNAL MEDICINE

## 2018-01-30 LAB
HIV AG/AB: NORMAL
HIV ANTIGEN: NORMAL
HIV-1 ANTIBODY: NORMAL
HIV-2 AB: NORMAL

## 2018-01-30 NOTE — FLOWSHEET NOTE
standing >/= 5 minutes, \"sometimes I get restless sitting. \"    Subjective:   See Initial evaluation    Objective:  See Initial Evaluation  Observation:   Test measurements:      Exercises:  Exercise/Equipment Resistance/Repetitions Other comments                                                                            Other Therapeutic Activities:  1/30/18:  Discussed at length anatomy and biomechanics of the foot and ankle, muscle reeducation, motor recruitment. Home Exercise Program:  1/30/18:   Patient instructed in calf stretch with towel, ankle dorsiflexion/plantarflexion, inversion/eversion, circles ; written instructions with pictures issued, patient able to demonstrate exercises. Manual Treatments:  Soft tissue mobilization to right foot and ankle x 10 minutes    Modalities:  IFC with cold pack x 20 minutes    Timed Code Treatment Minutes:  45    Total Treatment Minutes: 80     Treatment/Activity Tolerance:  [x] Patient tolerated treatment well [] Patient limited by fatigue  [] Patient limited by pain  [] Patient limited by other medical complications  [] Other:     Prognosis: [x] Good [] Fair  [] Poor    Patient Requires Follow-up: [x] Yes  [] No    Plan:   [] Continue per plan of care [] Alter current plan (see comments)  [x] Plan of care initiated [] Hold pending MD visit [] Discharge  Plan for Next Session:  Initiate NuStep, calf stretch on incline board if able. Focus on increasing AROM right ankle.     Electronically signed by:  Naida Wen, [id number] [facility]

## 2018-01-30 NOTE — PLAN OF CARE
Outpatient Physical Therapy  [] [facility]    Phone: [phone]   Fax: [phone]   [] [facility]  Phone: [phone]              Fax: [phone]  [x] Luana Callahan   Phone: [phone]   Fax: [phone]     To: Referring Practitioner: Deysi Carney MD      Patient: Luis Manuel Mares   : 1960   MRN: [id number]  Evaluation Date: 2018      Diagnosis Information:  · Diagnosis: Sprain of Anterior Talofibular LIgament of Right Ankle S93.491A   · Treatment Diagnosis: Right Ankle Pain     Physical Therapy Certification Form  Dear Dr. Nic Dixon,  The following patient has been evaluated for physical therapy services and for therapy to continue, Medicare requires monthly physician review of the treatment plan. Please review the attached evaluation and/or summary of the patient's plan of care, and verify that you agree therapy should continue by signing the attached document and sending it back to our office. Plan of Care/Treatment to date:  [x] Therapeutic Exercise    [x] Modalities:  [x] Therapeutic Activity     [] Ultrasound  [] Electrical Stimulation  [x] Gait Training      [] Cervical Traction [] Lumbar Traction  [x] Neuromuscular Re-education    [] Cold/hotpack [] Iontophoresis   [x] Instruction in HEP     Other:  [x] Manual Therapy      []             [] Aquatic Therapy      []           ? Frequency/Duration: 1-3 times per week for 4-6 weeks    Rehab Potential: [] Excellent [x] Good [] Fair  [] Poor       Electronically signed by:  Harinder Fermin, PT [id number]      If you have any questions or concerns, please don't hesitate to call.   Thank you for your referral.      Physician Signature:________________________________Date:__________________  By signing above, therapists plan is approved by physician

## 2018-01-30 NOTE — PROGRESS NOTES
and ankle intact to light touch)  Ambulation: Patient demonstrates mild antalgic gait, with decreased single limb support right LE, increased lateral sway noted  Balance  Single Leg Stance R Leg:  (Poor)  Single Leg Stance L Leg:  (Fair)  ANKLE AROM:          1/30/18   Left (Right)   Dorsiflexion 15 8   Plantarflexion 45 20   Inversion - Forefoot 50 15   Inversion - Rearfoot 25 10   Eversion - Forefoot 15 8   Eversion - Rearfoot 10 5   ANKLE STRENGTH:   Left (Right)   Dorsiflexion 5/5 4/5   Plantarflexion 5/5 4/5   Inversion 5/5 4/5   Eversion 5/5 4/5     GIRTH (cm) Left Right   Metatarsal Heads 22 22   Superior to [place] 28.5 29   Figure 8 53.5 54.5     Assessment   Conditions Requiring Skilled Therapeutic Intervention  Body structures, Functions, Activity limitations: Decreased functional mobility ; Decreased ROM; Decreased endurance;Decreased sensation;Decreased balance;Decreased strength;Decreased high-level IADLs  Assessment: Patient presents with limited mobility consistent with s/p right ankle sprain. Patient would benefit from PT to increase functional mobility. Prior Level of Function:  Independent, active  Treatment Diagnosis: Right Ankle Pain  Prognosis: Good  Decision Making: Low Complexity  REQUIRES PT FOLLOW UP: Yes  Activity Tolerance: Patient Tolerated treatment well         Plan :  Plan of Care has been initiated. Patient will be seen 1-3 times per week for 4-6 weeks. Current Treatment Recommendations: Strengthening, ROM, Balance Training, Neuromuscular Re-education, Home Exercise Program, Manual Therapy - Soft Tissue Mobilization, Endurance Training, Gait Training, Modalities, Stair training    G-Code  PT G-Codes  Functional Assessment Tool Used: Lower Extremity Functional Scale  Score: 20 = 25% Ability, 75% Disability  Functional Limitation: Mobility: Walking and moving around  Mobility: Walking and Moving Around Current Status ():  At least 60 percent but less than 80 percent impaired,

## 2018-02-01 ENCOUNTER — HOSPITAL ENCOUNTER (OUTPATIENT)
Dept: PHYSICAL THERAPY | Age: 58
Discharge: HOME | End: 2018-02-02
Admitting: INTERNAL MEDICINE

## 2018-02-01 ENCOUNTER — HOSPITAL ENCOUNTER (OUTPATIENT)
Dept: OTHER | Age: 58
Discharge: HOME | End: 2018-02-28
Attending: INTERNAL MEDICINE | Admitting: INTERNAL MEDICINE

## 2018-02-05 ENCOUNTER — HOSPITAL ENCOUNTER (OUTPATIENT)
Dept: PHYSICAL THERAPY | Age: 58
Discharge: HOME | End: 2018-02-06
Admitting: INTERNAL MEDICINE

## 2018-02-05 NOTE — FLOWSHEET NOTE
standing >/= 5 minutes, \"sometimes I get restless sitting. \"    Subjective:   Patient reports ankle has been doing okay, \"I've been sleeping better\". Objective:    Observation:   Test measurements:      Exercises:  Exercise/Equipment Resistance/Repetitions Other comments   NuStep Level 1 x 10 minutes Seat, UEs #10   Calf Stretch 3 x 30 seconds Incline Board   Leg Press 60 pounds, 2 x 10 reps Seat #2   Ankle Press 60 pounds, 1 x 10 reps  Seat #2                                                        Other Therapeutic Activities:  1/30/18:  Discussed at length anatomy and biomechanics of the foot and ankle, muscle reeducation, motor recruitment. Home Exercise Program:  1/30/18:   Patient instructed in calf stretch with towel, ankle dorsiflexion/plantarflexion, inversion/eversion, circles ; written instructions with pictures issued, patient able to demonstrate exercises. Manual Treatments:  Soft tissue mobilization to right foot and ankle x 10 minutes    Modalities:  IFC with cold pack x 20 minutes    Timed Code Treatment Minutes:  25    Total Treatment Minutes: 45     Treatment/Activity Tolerance:  [x] Patient tolerated treatment well [] Patient limited by fatigue  [] Patient limited by pain  [] Patient limited by other medical complications  [] Other:     Prognosis: [x] Good [] Fair  [] Poor    Patient Requires Follow-up: [x] Yes  [] No    Plan:   [x] Continue per plan of care [] Alter current plan (see comments)  [] Plan of care initiated [] Hold pending MD visit [] Discharge  Plan for Next Session:  Increase reps on leg press and/or ankle press if able. Focus on increasing AROM right ankle.     Electronically signed by:  Yves Diaz, [address] [facility]

## 2018-02-06 ENCOUNTER — TELEPHONE (OUTPATIENT)
Dept: INTERNAL MEDICINE CLINIC | Age: 58
End: 2018-02-06

## 2018-02-07 ENCOUNTER — HOSPITAL ENCOUNTER (OUTPATIENT)
Dept: PHYSICAL THERAPY | Age: 58
Discharge: HOME | End: 2018-02-08
Admitting: INTERNAL MEDICINE

## 2018-02-13 ENCOUNTER — TELEPHONE (OUTPATIENT)
Dept: INTERNAL MEDICINE CLINIC | Age: 58
End: 2018-02-13

## 2018-02-14 ENCOUNTER — HOSPITAL ENCOUNTER (OUTPATIENT)
Dept: PHYSICAL THERAPY | Age: 58
Discharge: HOME | End: 2018-02-15
Admitting: INTERNAL MEDICINE

## 2018-02-14 RX ORDER — ASPIRIN 81 MG/1
TABLET, CHEWABLE ORAL
Qty: 90 TABLET | Refills: 3 | Status: SHIPPED | OUTPATIENT
Start: 2018-02-14 | End: 2019-03-10 | Stop reason: SDUPTHER

## 2018-02-14 NOTE — FLOWSHEET NOTE
Physical Therapy Daily Treatment Note  Date:  2018    Patient Name:  Tyler Flores    :  1960  MRN: [id number]  Restrictions/Precautions:    Pertinent Medical History:  Medical/Treatment Diagnosis Information:  · Diagnosis: Sprain of Anterior Talofibular LIgament of Right Ankle S93.491A  · Treatment Diagnosis: Right Ankle Pain  Insurance/Certification information:  PT Insurance Information: [facility], allowed 30 visits per year including PT evaluation  Physician Information:  Referring Practitioner: Yesenia Rios MD  Plan of care signed (Y/N): Cosign received   Visit# / total visits:   Pain level: 4/10 at rest, 4/10 with activity     G-Code (if applicable):      Date / Visit # G-Code Applied:  18/ Visit #1  PT G-Codes  Functional Assessment Tool Used: Lower Extremity Functional Scale  Score: 20 = 25% Ability, 75% Disability  Functional Limitation: Mobility: Walking and moving around  Mobility: Walking and Moving Around Current Status (): At least 60 percent but less than 80 percent impaired, limited or restricted  Mobility: Walking and Moving Around Goal Status (): At least 20 percent but less than 40 percent impaired, limited or restricted    Progress Note: []  Yes  []  No  Next due by: Visit #10      History of Injury:Patient reports approximately 1 month ago he was walking out in the yard, right foot \"went out from under me. I can't feel my feet. \"  Patient reports significant pain noted. Patient went to ED approximately 1 week later, put in air cast.  Patient used it 4-5 days, then it started bothering patient, so he went to see primary care MD.  Patient reports he has sciatic nerve pain that hurts all the time, patient reports pain in foot and ankle. \"It seems like it's sore and it throbs all day. The pain never goes out unless I go to sleep. \"  Patient has difficulty getting to sleep due to pain.   Patient reports increased right LE pain after walking >/= 5 minutes,

## 2018-02-15 ENCOUNTER — HOSPITAL ENCOUNTER (OUTPATIENT)
Dept: PHYSICAL THERAPY | Age: 58
Discharge: HOME | End: 2018-02-16
Admitting: INTERNAL MEDICINE

## 2018-02-15 NOTE — FLOWSHEET NOTE
Physical Therapy Daily Treatment Note  Date:  2/15/2018    Patient Name:  Jose Marin    :  1960  MRN: [id number]  Restrictions/Precautions:    Pertinent Medical History:  Medical/Treatment Diagnosis Information:  · Diagnosis: Sprain of Anterior Talofibular LIgament of Right Ankle S93.491A  · Treatment Diagnosis: Right Ankle Pain  Insurance/Certification information:  PT Insurance Information: Jerilyn Abbasi, allowed 30 visits per year including PT evaluation  Physician Information:  Referring Practitioner: Gaye Appiah MD  Plan of care signed (Y/N): Cosign received   Visit# / total visits:   Pain level: 2-4/10 at rest, 2-4/10 with activity     G-Code (if applicable):      Date / Visit # G-Code Applied:  18/ Visit #1  PT G-Codes  Functional Assessment Tool Used: Lower Extremity Functional Scale  Score: 20 = 25% Ability, 75% Disability  Functional Limitation: Mobility: Walking and moving around  Mobility: Walking and Moving Around Current Status (): At least 60 percent but less than 80 percent impaired, limited or restricted  Mobility: Walking and Moving Around Goal Status (): At least 20 percent but less than 40 percent impaired, limited or restricted    Progress Note: []  Yes  []  No  Next due by: Visit #10      History of Injury:Patient reports approximately 1 month ago he was walking out in the yard, right foot \"went out from under me. I can't feel my feet. \"  Patient reports significant pain noted. Patient went to ED approximately 1 week later, put in air cast.  Patient used it 4-5 days, then it started bothering patient, so he went to see primary care MD.  Patient reports he has sciatic nerve pain that hurts all the time, patient reports pain in foot and ankle. \"It seems like it's sore and it throbs all day. The pain never goes out unless I go to sleep. \"  Patient has difficulty getting to sleep due to pain.   Patient reports increased right LE pain after walking >/= 5 minutes,

## 2018-02-19 ENCOUNTER — HOSPITAL ENCOUNTER (OUTPATIENT)
Dept: PHYSICAL THERAPY | Age: 58
Discharge: HOME | End: 2018-02-20
Admitting: INTERNAL MEDICINE

## 2018-02-19 NOTE — FLOWSHEET NOTE
Physical Therapy Daily Treatment Note  Date:  2018    Patient Name:  Nasim Ornelas    :  1960  MRN: [id number]  Restrictions/Precautions:    Pertinent Medical History:  Medical/Treatment Diagnosis Information:  · Diagnosis: Sprain of Anterior Talofibular LIgament of Right Ankle S93.491A  · Treatment Diagnosis: Right Ankle Pain  Insurance/Certification information:  PT Insurance Information: [place], allowed 30 visits per year including PT evaluation  Physician Information:  Referring Practitioner: Chelsea Ramsay MD  Plan of care signed (Y/N): Cosign received   Visit# / total visits:   Pain level: 0-2/10 at rest, 2/10 with activity     G-Code (if applicable):      Date / Visit # G-Code Applied:  18/ Visit #1  PT G-Codes  Functional Assessment Tool Used: Lower Extremity Functional Scale  Score: 20 = 25% Ability, 75% Disability  Functional Limitation: Mobility: Walking and moving around  Mobility: Walking and Moving Around Current Status (): At least 60 percent but less than 80 percent impaired, limited or restricted  Mobility: Walking and Moving Around Goal Status (): At least 20 percent but less than 40 percent impaired, limited or restricted    Progress Note: []  Yes  []  No  Next due by: Visit #10      History of Injury:Patient reports approximately 1 month ago he was walking out in the yard, right foot \"went out from under me. I can't feel my feet. \"  Patient reports significant pain noted. Patient went to ED approximately 1 week later, put in air cast.  Patient used it 4-5 days, then it started bothering patient, so he went to see primary care MD.  Patient reports he has sciatic nerve pain that hurts all the time, patient reports pain in foot and ankle. \"It seems like it's sore and it throbs all day. The pain never goes out unless I go to sleep. \"  Patient has difficulty getting to sleep due to pain.   Patient reports increased right LE pain after walking >/= 5 minutes,

## 2018-02-20 ENCOUNTER — OFFICE VISIT (OUTPATIENT)
Dept: PSYCHIATRY | Age: 58
End: 2018-02-20

## 2018-02-20 VITALS
HEIGHT: 69 IN | OXYGEN SATURATION: 98 % | WEIGHT: 190.2 LBS | DIASTOLIC BLOOD PRESSURE: 88 MMHG | HEART RATE: 92 BPM | SYSTOLIC BLOOD PRESSURE: 138 MMHG | BODY MASS INDEX: 28.17 KG/M2

## 2018-02-20 DIAGNOSIS — F10.11 ALCOHOL ABUSE, IN REMISSION: ICD-10-CM

## 2018-02-20 DIAGNOSIS — F31.62 BIPOLAR DISORDER, CURRENT EPISODE MIXED, MODERATE (HCC): ICD-10-CM

## 2018-02-20 DIAGNOSIS — F31.62 BIPOLAR DISORDER, CURRENT EPISODE MIXED, MODERATE (HCC): Primary | ICD-10-CM

## 2018-02-20 LAB
FOLATE: >20 NG/ML (ref 4.78–24.2)
TSH REFLEX: 3.28 UIU/ML (ref 0.27–4.2)
VITAMIN B-12: <150 PG/ML (ref 211–911)

## 2018-02-20 PROCEDURE — 99214 OFFICE O/P EST MOD 30 MIN: CPT | Performed by: PSYCHIATRY & NEUROLOGY

## 2018-02-20 RX ORDER — QUETIAPINE 300 MG/1
300 TABLET, FILM COATED, EXTENDED RELEASE ORAL NIGHTLY
Qty: 30 TABLET | Refills: 1 | Status: SHIPPED | OUTPATIENT
Start: 2018-02-20 | End: 2018-03-27 | Stop reason: SDUPTHER

## 2018-02-20 ASSESSMENT — PATIENT HEALTH QUESTIONNAIRE - PHQ9
5. POOR APPETITE OR OVEREATING: 0
1. LITTLE INTEREST OR PLEASURE IN DOING THINGS: 1
3. TROUBLE FALLING OR STAYING ASLEEP: 0
4. FEELING TIRED OR HAVING LITTLE ENERGY: 1
2. FEELING DOWN, DEPRESSED OR HOPELESS: 1
8. MOVING OR SPEAKING SO SLOWLY THAT OTHER PEOPLE COULD HAVE NOTICED. OR THE OPPOSITE, BEING SO FIGETY OR RESTLESS THAT YOU HAVE BEEN MOVING AROUND A LOT MORE THAN USUAL: 0
9. THOUGHTS THAT YOU WOULD BE BETTER OFF DEAD, OR OF HURTING YOURSELF: 1
7. TROUBLE CONCENTRATING ON THINGS, SUCH AS READING THE NEWSPAPER OR WATCHING TELEVISION: 1
10. IF YOU CHECKED OFF ANY PROBLEMS, HOW DIFFICULT HAVE THESE PROBLEMS MADE IT FOR YOU TO DO YOUR WORK, TAKE CARE OF THINGS AT HOME, OR GET ALONG WITH OTHER PEOPLE: 1
6. FEELING BAD ABOUT YOURSELF - OR THAT YOU ARE A FAILURE OR HAVE LET YOURSELF OR YOUR FAMILY DOWN: 1
SUM OF ALL RESPONSES TO PHQ9 QUESTIONS 1 & 2: 2
SUM OF ALL RESPONSES TO PHQ QUESTIONS 1-9: 6

## 2018-02-20 ASSESSMENT — ANXIETY QUESTIONNAIRES
6. BECOMING EASILY ANNOYED OR IRRITABLE: 1-SEVERAL DAYS
1. FEELING NERVOUS, ANXIOUS, OR ON EDGE: 1-SEVERAL DAYS
4. TROUBLE RELAXING: 0-NOT AT ALL SURE
5. BEING SO RESTLESS THAT IT IS HARD TO SIT STILL: 0-NOT AT ALL SURE
2. NOT BEING ABLE TO STOP OR CONTROL WORRYING: 1-SEVERAL DAYS
7. FEELING AFRAID AS IF SOMETHING AWFUL MIGHT HAPPEN: 1-SEVERAL DAYS
GAD7 TOTAL SCORE: 5
3. WORRYING TOO MUCH ABOUT DIFFERENT THINGS: 1-SEVERAL DAYS

## 2018-02-20 NOTE — PROGRESS NOTES
Chief Complaint   Patient presents with    Manic Behavior     S: Pt has been taking quetiapine XR 150mg qhs. He did not increase to the 300mg. He reports tolerating this well. He does feel a bit more fatigued during the day but this seems to be improving. Sleep is normal about 6-7 hrs per night. He continues to note some mood lability, irritability and being upsetting by very minor statements others may make. Mood can shift easily within the same day, and can be drastic swings like becoming tearful at times. He denies SI and overall feels his mood is better and anxiety has improved. He continues to have issues with the separation from his girlfriend. They  2 months ago but he contacts her daily still, often she doesn't answer. He will be irritated for several hours before he calms down again if she doesn't answer but he also feels the intensity of this irritability is improving. Denies alcohol use. ROS: no headaches, vision problems, dysuria, abd pain, chest pain or SOB. Reports +pain in his rt leg 2/2 sciatica.      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     Brief Psych Hx:  Diagnoses: depression, anxiety  Med trials: paxil up to 30mg, trazodone 150mg, belsomra (although pt doesn't recall if he took)  Outpt: no prior psych or therapy    Current Outpatient Prescriptions   Medication Sig Dispense Refill    QUEtiapine (SEROQUEL XR) 300 MG extended release tablet Take 1 tablet by mouth nightly 30 tablet 1    aspirin (ASPIRIN LOW DOSE) 81 MG chewable tablet CHEW ONE TABLET BY MOUTH DAILY 90 tablet 3    tiZANidine (ZANAFLEX) 4 MG tablet Take 1 tablet by mouth 3 times daily 30 tablet 1    atorvastatin (LIPITOR) 40 MG tablet TAKE ONE TABLET BY MOUTH ONCE NIGHTLY 30 tablet 2    cetirizine (ZYRTEC) 10 MG tablet TAKE ONE TABLET BY MOUTH DAILY 30 tablet 11    clopidogrel (PLAVIX) 75 MG tablet TAKE ONE TABLET BY Status Exam:   Appearance    alert, cooperative. No PMA/PMR   Behavior pleasant, jovial and talkative with staff but less intensely than the past  Motor: Normal strength and tone, No abnormal movements, tics or mannerisms. Speech    Normal rate and quantity, normal tone. Non-pressured  Mood/Affect    \"good today\" / +labile, smiling and happy initially, tearful at various times but then goes back to being pleasant  Thought Process  linear, goal directed and coherent  Thought Content    Future oriented, no suicidal ideation  Associations    logical connections  Attention/Concentration   Intact, less distracted  Memory    recent and remote memory intact  Insight/Judgement   limited / fair (improved)    Labs:     Office Visit on 01/29/2018   Component Date Value Ref Range Status    Sodium 01/29/2018 143  136 - 145 mmol/L Final    Potassium 01/29/2018 4.6  3.5 - 5.1 mmol/L Final    Chloride 01/29/2018 101  99 - 110 mmol/L Final    CO2 01/29/2018 29  21 - 32 mmol/L Final    Anion Gap 01/29/2018 13  3 - 16 Final    Glucose 01/29/2018 97  70 - 99 mg/dL Final    BUN 01/29/2018 12  7 - 20 mg/dL Final    CREATININE 01/29/2018 1.0  0.9 - 1.3 mg/dL Final    GFR Non- 01/29/2018 >60  >60 Final    Comment: >60 mL/min/1.73m2 EGFR, calc. for ages 25 and older using the  MDRD formula (not corrected for weight), is valid for stable  renal function.  GFR  01/29/2018 >60  >60 Final    Comment: Chronic Kidney Disease: less than 60 ml/min/1.73 sq.m. Kidney Failure: less than 15 ml/min/1.73 sq.m. Results valid for patients 18 years and older.       Calcium 01/29/2018 9.8  8.3 - 10.6 mg/dL Final    Total Protein 01/29/2018 7.0  6.4 - 8.2 g/dL Final    Alb 01/29/2018 4.7  3.4 - 5.0 g/dL Final    Albumin/Globulin Ratio 01/29/2018 2.0  1.1 - 2.2 Final    Total Bilirubin 01/29/2018 0.4  0.0 - 1.0 mg/dL Final    Alkaline Phosphatase 01/29/2018 87  40 - 129 U/L Final    ALT 01/29/2018

## 2018-02-22 ENCOUNTER — TELEPHONE (OUTPATIENT)
Dept: PSYCHIATRY | Age: 58
End: 2018-02-22

## 2018-02-23 ENCOUNTER — HOSPITAL ENCOUNTER (OUTPATIENT)
Dept: PHYSICAL THERAPY | Age: 58
Discharge: HOME | End: 2018-02-24
Admitting: INTERNAL MEDICINE

## 2018-02-23 NOTE — PROGRESS NOTES
Outpatient Physical Therapy  [] [facility]    Phone: [phone]   Fax: [phone]   [] [facility]  Phone: [phone]   Fax: [phone]  [x] [facility] Quintana              Phone: [phone]   Fax: [phone]     Physical Therapy Discharge Note  Date: 2018        Patient Name:  Alberto Choi    :  1960  MRN: [id number]  Restrictions/Precautions:    Pertinent Medical History:  Medical/Treatment Diagnosis Information:  · Diagnosis: Sprain of Anterior Talofibular LIgament of Right Ankle S93.491A  · Treatment Diagnosis: Right Ankle Pain  Insurance/Certification information:  PT Insurance Information: kristie Ritter 30 visits per year including PT evaluation  Physician Information:  Referring Practitioner: Donna Hernandez MD  Plan of care signed (Y/N): Cosign received   Visit# / total visits:   Pain level:      0-2/10 at rest, 2/10 with activity            G-Code (if applicable):      Date G-Code Applied:  18 / Visit #8  PT G-Codes  Functional Assessment Tool Used: Lower Extremity Functional Scale  Score: 52 = 65% Ability, 35% Disability  Functional Limitation: Mobility: Walking and moving around  Mobility: Walking and Moving Around Current Status (): At least 20 percent but less than 40 percent impaired, limited or restricted  Mobility: Walking and Moving Around Discharge Status (): At least 20 percent but less than 40 percent impaired, limited or restricted     Time Period for Report: 18 thru 18   Cancels/No-shows to date:  None    Plan of Care/Treatment to date:  [x] Therapeutic Exercise    [x] Modalities:  [x] Therapeutic Activity     [] Ultrasound  [x] Electrical Stimulation  [x] Gait Training      [] Cervical Traction    [] Lumbar Traction  [x] Neuromuscular Re-education  [x] Cold/hotpack [] Iontophoresis  [x] Instruction in HEP      Other:  [x] Manual Therapy       []    [] Aquatic Therapy       []                    ?       Significant Findings At Last Visit/Comments:    Subjective: Patient reports ankle has been feeling \"pretty good, only a little soreness from time to time. \"            Objective:  Observation:  Test measurements:     ANKLE AROM:                                                                                                          1/30/18 2/23/18    Left (Right) (Right)   Dorsiflexion 15 8 18   Plantarflexion 45 20 50   Inversion - Forefoot 50 15 25   Inversion - Rearfoot 25 10 15   Eversion - Forefoot 15 8 15   Eversion - Rearfoot 10 5 8   ANKLE STRENGTH:    Left (Right) (Right)   Dorsiflexion 5/5 4/5 5/5   Plantarflexion 5/5 4/5 5/5   Inversion 5/5 4/5 5/5   Eversion 5/5 4/5 5/5        Assessment:  Summary: Patient has been seen for 8 PT visits with PT goals met. Patient is independent with written HEP. Patient's response to treatment: Good    Progress towards goals:    Short term goals  Time Frame for Short term goals: 4-6 weeks  Short term goal 1: Pain right ankle </= 2/10 at rest, 4/10 with activity Met  Short term goal 2: patient able to demonstrate AROM right ankle WFL Met  Short term goal 3: patient able to ambulate >/= 15 minutes without increased symptoms, gait pattern WNL Met  Short term goal 4: Patient able to perform light chores >/= 15 minutes without increased symptoms Met  Short term goal 5: Patient independent with written home exercise program Met  Patient Goals   Patient goals : Get well with no pain Met  Current Frequency/Duration:  # Days per week: [] 1 day # Weeks: [] 1 week [x] 4 weeks      [x] 2 days? [] 2 weeks [] 5 weeks      [] 3 days   [] 3 weeks [] 6 weeks     Rehab Potential: [] Excellent [x] Good [] Fair  [] Poor     Goal Status:  [x] Achieved [] Partially Achieved  [] Not Achieved     Patient Status:      [x] Patient now discharged         Electronically signed by:  Frank Lopez, PT [id number]    If you have any questions or concerns, please don't hesitate to call.   Thank you for your referral.    Physician Signature:________________________________Date:__________________  By signing above, therapists plan is approved by physician

## 2018-02-23 NOTE — FLOWSHEET NOTE
Physical Therapy Daily Treatment Note  Date:  2018    Patient Name:  Vianca Rodriguez    :  1960  MRN: [id number]  Restrictions/Precautions:    Pertinent Medical History:  Medical/Treatment Diagnosis Information:  · Diagnosis: Sprain of Anterior Talofibular LIgament of Right Ankle S93.491A  · Treatment Diagnosis: Right Ankle Pain  Insurance/Certification information:  PT Insurance Information: [facility], allowed 30 visits per year including PT evaluation  Physician Information:  Referring Practitioner: Florentino Appiah MD  Plan of care signed (Y/N): Cosign received   Visit# / total visits:   Pain level: 0-2/10 at rest, 2/10 with activity     G-Code (if applicable):      Date / Visit # G-Code Applied:  18/ Visit #8  PT G-Codes  Functional Assessment Tool Used: Lower Extremity Functional Scale  Score: 52 = 65% Ability, 35% Disability  Functional Limitation: Mobility: Walking and moving around  Mobility: Walking and Moving Around Current Status (): At least 20 percent but less than 40 percent impaired, limited or restricted  Mobility: Walking and Moving Around Discharge Status (G89=80): At least 20 percent but less than 40 percent impaired, limited or restricted    Progress Note: []  Yes  []  No  Next due by: Visit #10      History of Injury:Patient reports approximately 1 month ago he was walking out in the yard, right foot \"went out from under me. I can't feel my feet. \"  Patient reports significant pain noted. Patient went to ED approximately 1 week later, put in air cast.  Patient used it 4-5 days, then it started bothering patient, so he went to see primary care MD.  Patient reports he has sciatic nerve pain that hurts all the time, patient reports pain in foot and ankle. \"It seems like it's sore and it throbs all day. The pain never goes out unless I go to sleep. \"  Patient has difficulty getting to sleep due to pain.   Patient reports increased right LE pain after walking >/= 5 minutes     Timed Code Treatment Minutes:  30    Total Treatment Minutes: 50     Treatment/Activity Tolerance:  [x] Patient tolerated treatment well [] Patient limited by fatigue  [] Patient limited by pain  [] Patient limited by other medical complications  [] Other:     Prognosis: [x] Good [] Fair  [] Poor    Patient Requires Follow-up: [x] Yes  [x] No    Plan:   [] Continue per plan of care [] Alter current plan (see comments)  [] Plan of care initiated [] Hold pending MD visit X- Discharge  Plan for Next Session:  Patient discharged to independent home exercise program.      Electronically signed by:  Yves Diaz, [address] [facility]

## 2018-03-01 ENCOUNTER — HOSPITAL ENCOUNTER (OUTPATIENT)
Dept: OTHER | Age: 58
Discharge: HOME | End: 2018-03-31
Attending: INTERNAL MEDICINE | Admitting: INTERNAL MEDICINE

## 2018-03-24 DIAGNOSIS — J44.9 CHRONIC OBSTRUCTIVE PULMONARY DISEASE, UNSPECIFIED COPD TYPE (HCC): ICD-10-CM

## 2018-03-26 RX ORDER — UMECLIDINIUM BROMIDE AND VILANTEROL TRIFENATATE 62.5; 25 UG/1; UG/1
POWDER RESPIRATORY (INHALATION)
Qty: 60 EACH | Refills: 4 | Status: SHIPPED | OUTPATIENT
Start: 2018-03-26 | End: 2018-05-11 | Stop reason: SDUPTHER

## 2018-03-27 ENCOUNTER — OFFICE VISIT (OUTPATIENT)
Dept: PSYCHIATRY | Age: 58
End: 2018-03-27

## 2018-03-27 VITALS
HEIGHT: 69 IN | BODY MASS INDEX: 28.23 KG/M2 | DIASTOLIC BLOOD PRESSURE: 72 MMHG | HEART RATE: 90 BPM | SYSTOLIC BLOOD PRESSURE: 102 MMHG | OXYGEN SATURATION: 97 % | WEIGHT: 190.6 LBS

## 2018-03-27 DIAGNOSIS — F31.78 BIPOLAR DISORDER, IN FULL REMISSION, MOST RECENT EPISODE MIXED (HCC): Primary | ICD-10-CM

## 2018-03-27 DIAGNOSIS — F10.11 ALCOHOL ABUSE, IN REMISSION: ICD-10-CM

## 2018-03-27 DIAGNOSIS — F17.200 TOBACCO USE DISORDER: ICD-10-CM

## 2018-03-27 PROCEDURE — 99214 OFFICE O/P EST MOD 30 MIN: CPT | Performed by: PSYCHIATRY & NEUROLOGY

## 2018-03-27 RX ORDER — QUETIAPINE 300 MG/1
300 TABLET, FILM COATED, EXTENDED RELEASE ORAL NIGHTLY
Qty: 30 TABLET | Refills: 2 | Status: SHIPPED | OUTPATIENT
Start: 2018-03-27 | End: 2019-04-08

## 2018-03-27 ASSESSMENT — PATIENT HEALTH QUESTIONNAIRE - PHQ9
3. TROUBLE FALLING OR STAYING ASLEEP: 0
9. THOUGHTS THAT YOU WOULD BE BETTER OFF DEAD, OR OF HURTING YOURSELF: 0
SUM OF ALL RESPONSES TO PHQ9 QUESTIONS 1 & 2: 2
7. TROUBLE CONCENTRATING ON THINGS, SUCH AS READING THE NEWSPAPER OR WATCHING TELEVISION: 1
4. FEELING TIRED OR HAVING LITTLE ENERGY: 2
2. FEELING DOWN, DEPRESSED OR HOPELESS: 1
5. POOR APPETITE OR OVEREATING: 0
10. IF YOU CHECKED OFF ANY PROBLEMS, HOW DIFFICULT HAVE THESE PROBLEMS MADE IT FOR YOU TO DO YOUR WORK, TAKE CARE OF THINGS AT HOME, OR GET ALONG WITH OTHER PEOPLE: 1
6. FEELING BAD ABOUT YOURSELF - OR THAT YOU ARE A FAILURE OR HAVE LET YOURSELF OR YOUR FAMILY DOWN: 1
1. LITTLE INTEREST OR PLEASURE IN DOING THINGS: 1
8. MOVING OR SPEAKING SO SLOWLY THAT OTHER PEOPLE COULD HAVE NOTICED. OR THE OPPOSITE, BEING SO FIGETY OR RESTLESS THAT YOU HAVE BEEN MOVING AROUND A LOT MORE THAN USUAL: 0
SUM OF ALL RESPONSES TO PHQ QUESTIONS 1-9: 6

## 2018-03-27 NOTE — PROGRESS NOTES
trazodone 150mg, belsomra (although pt doesn't recall if he took)  Outpt: no prior psych or therapy    Current Outpatient Prescriptions   Medication Sig Dispense Refill    cyanocobalamin ([facility] VITAMIN B12) 1000 MCG tablet Take 1 tablet by mouth daily 30 tablet 2    QUEtiapine (SEROQUEL XR) 300 MG extended release tablet Take 1 tablet by mouth nightly 30 tablet 2    aspirin (ASPIRIN LOW DOSE) 81 MG chewable tablet CHEW ONE TABLET BY MOUTH DAILY 90 tablet 3    tiZANidine (ZANAFLEX) 4 MG tablet Take 1 tablet by mouth 3 times daily 30 tablet 1    atorvastatin (LIPITOR) 40 MG tablet TAKE ONE TABLET BY MOUTH ONCE NIGHTLY 30 tablet 2    cetirizine (ZYRTEC) 10 MG tablet TAKE ONE TABLET BY MOUTH DAILY 30 tablet 11    clopidogrel (PLAVIX) 75 MG tablet TAKE ONE TABLET BY MOUTH DAILY 30 tablet 5    omeprazole (PRILOSEC) 20 MG delayed release capsule TAKE ONE CAPSULE BY MOUTH DAILY 30 capsule 5    albuterol sulfate HFA (PROVENTIL HFA) 108 (90 Base) MCG/ACT inhaler Inhale 2 puffs into the lungs every 4 hours as needed for Wheezing or Shortness of Breath (Space out to every 6 hours as symptoms improve) Space out to every 6 hours as symptoms improve. 1 Inhaler 11    Emollient (AQUAPHILIC) OINT Apply daily to dry skin. 454 g 5    metoprolol succinate (TOPROL XL) 25 MG extended release tablet TAKE ONE-HALF TABLET BY MOUTH DAILY 90 tablet 1    lisinopril (PRINIVIL;ZESTRIL) 20 MG tablet Take 20 mg by mouth daily      ANORO ELLIPTA 62.5-25 MCG/INH AEPB inhaler INHALE ONE DOSE BY MOUTH DAILY 60 each 4    naproxen (NAPROSYN) 500 MG tablet Take 1 tablet by mouth 2 times daily for 14 doses One tab twice a day when necessary pain 14 tablet 0     No current facility-administered medications for this visit.         O:  Wt Readings from Last 3 Encounters:   03/27/18 190 lb 9.6 oz (86.5 kg)   02/20/18 190 lb 3.2 oz (86.3 kg)   01/29/18 192 lb (87.1 kg)     Temp Readings from Last 3 Encounters:   01/29/18 97.9 °F (36.6 °C)   01/11/18 97.9 °F (36.6 °C) (Oral)   12/19/17 98.2 °F (36.8 °C) (Oral)     BP Readings from Last 3 Encounters:   03/27/18 102/72   02/20/18 138/88   01/29/18 120/70     Pulse Readings from Last 3 Encounters:   03/27/18 90   02/20/18 92   01/29/18 74     PHQ Scores 3/27/2018 2/20/2018 12/26/2017   PHQ2 Score 2 2 3   PHQ9 Score 6 6 17     Interpretation of Total Score Depression Severity: 1-4 = Minimal depression, 5-9 = Mild depression, 10-14 = Moderate depression, 15-19 = Moderately severe depression, 20-27 = Severe depression    HILDA 7 SCORE 2/20/2018 12/26/2017   HILDA-7 Total Score 5 17     Interpretation of HILDA-7 score: 5-9 = mild anxiety, 10-14 = moderate anxiety, 15+ = severe anxiety. Recommend referral to behavioral health for scores 10 or greater. Mental Status Exam:   Appearance    alert, cooperative. No PMA/PMR   Behavior pleasant, jovial and talkative with staff  Motor: Normal strength and tone, No abnormal movements, tics or mannerisms. Speech    Normal rate and quantity, normal tone. Non-pressured  Mood/Affect    \"pretty good\" / non-labile, full quality, good motility and range  Thought Process  linear, goal directed and coherent  Thought Content    Future oriented, no suicidal ideation, no homicidal ideation  Associations    logical connections  Attention/Concentration   Intact  Memory    recent and remote memory intact  Insight/Judgement   fair / fair     Labs:     Orders Only on 02/20/2018   Component Date Value Ref Range Status    TSH 02/20/2018 3.28  0.27 - 4.20 uIU/mL Final    Vitamin B-12 02/20/2018 <150* 211 - 911 pg/mL Final    Folate 02/20/2018 >20.00  4.78 - 24.20 ng/mL Final    Comment: Effective 11-15-16 10:00am EST  Please note reference ranges have  changed for Folate.              Prudence Calvo MD  Psychiatrist

## 2018-04-04 ENCOUNTER — TELEPHONE (OUTPATIENT)
Dept: INTERNAL MEDICINE CLINIC | Age: 58
End: 2018-04-04

## 2018-04-23 RX ORDER — ATORVASTATIN CALCIUM 40 MG/1
TABLET, FILM COATED ORAL
Qty: 30 TABLET | Refills: 1 | Status: SHIPPED | OUTPATIENT
Start: 2018-04-23 | End: 2018-05-11 | Stop reason: SDUPTHER

## 2018-05-11 ENCOUNTER — OFFICE VISIT (OUTPATIENT)
Dept: INTERNAL MEDICINE CLINIC | Age: 58
End: 2018-05-11

## 2018-05-11 VITALS
HEART RATE: 75 BPM | DIASTOLIC BLOOD PRESSURE: 76 MMHG | OXYGEN SATURATION: 98 % | WEIGHT: 186 LBS | HEIGHT: 69 IN | BODY MASS INDEX: 27.55 KG/M2 | SYSTOLIC BLOOD PRESSURE: 122 MMHG | RESPIRATION RATE: 16 BRPM

## 2018-05-11 DIAGNOSIS — I10 ESSENTIAL HYPERTENSION: Primary | ICD-10-CM

## 2018-05-11 DIAGNOSIS — J44.9 CHRONIC OBSTRUCTIVE PULMONARY DISEASE, UNSPECIFIED COPD TYPE (HCC): ICD-10-CM

## 2018-05-11 DIAGNOSIS — H69.81 EUSTACHIAN TUBE DYSFUNCTION, RIGHT: ICD-10-CM

## 2018-05-11 DIAGNOSIS — J41.8 MIXED SIMPLE AND MUCOPURULENT CHRONIC BRONCHITIS (HCC): ICD-10-CM

## 2018-05-11 PROCEDURE — 1036F TOBACCO NON-USER: CPT | Performed by: INTERNAL MEDICINE

## 2018-05-11 PROCEDURE — 3023F SPIROM DOC REV: CPT | Performed by: INTERNAL MEDICINE

## 2018-05-11 PROCEDURE — 3017F COLORECTAL CA SCREEN DOC REV: CPT | Performed by: INTERNAL MEDICINE

## 2018-05-11 PROCEDURE — 99214 OFFICE O/P EST MOD 30 MIN: CPT | Performed by: INTERNAL MEDICINE

## 2018-05-11 PROCEDURE — G8417 CALC BMI ABV UP PARAM F/U: HCPCS | Performed by: INTERNAL MEDICINE

## 2018-05-11 PROCEDURE — G8926 SPIRO NO PERF OR DOC: HCPCS | Performed by: INTERNAL MEDICINE

## 2018-05-11 PROCEDURE — G8598 ASA/ANTIPLAT THER USED: HCPCS | Performed by: INTERNAL MEDICINE

## 2018-05-11 PROCEDURE — G8427 DOCREV CUR MEDS BY ELIG CLIN: HCPCS | Performed by: INTERNAL MEDICINE

## 2018-05-11 RX ORDER — CLOPIDOGREL BISULFATE 75 MG/1
75 TABLET ORAL DAILY
Qty: 90 TABLET | Refills: 2 | Status: SHIPPED | OUTPATIENT
Start: 2018-05-11 | End: 2019-02-13 | Stop reason: SDUPTHER

## 2018-05-11 RX ORDER — LISINOPRIL 20 MG/1
20 TABLET ORAL DAILY
Qty: 90 TABLET | Refills: 2 | Status: SHIPPED | OUTPATIENT
Start: 2018-05-11 | End: 2019-02-13 | Stop reason: SDUPTHER

## 2018-05-11 RX ORDER — CETIRIZINE HYDROCHLORIDE 10 MG/1
TABLET ORAL
Qty: 30 TABLET | Refills: 11 | Status: SHIPPED | OUTPATIENT
Start: 2018-05-11 | End: 2019-05-02 | Stop reason: SDUPTHER

## 2018-05-11 RX ORDER — OMEPRAZOLE 20 MG/1
CAPSULE, DELAYED RELEASE ORAL
Qty: 30 CAPSULE | Refills: 5 | Status: SHIPPED | OUTPATIENT
Start: 2018-05-11 | End: 2018-11-29 | Stop reason: SDUPTHER

## 2018-05-11 RX ORDER — ATORVASTATIN CALCIUM 40 MG/1
40 TABLET, FILM COATED ORAL DAILY
Qty: 90 TABLET | Refills: 2 | Status: SHIPPED | OUTPATIENT
Start: 2018-05-11 | End: 2019-09-18 | Stop reason: SDUPTHER

## 2018-05-11 RX ORDER — ALBUTEROL SULFATE 90 UG/1
2 AEROSOL, METERED RESPIRATORY (INHALATION) EVERY 4 HOURS PRN
Qty: 1 INHALER | Refills: 11 | Status: SHIPPED | OUTPATIENT
Start: 2018-05-11 | End: 2019-04-08 | Stop reason: ALTCHOICE

## 2018-05-11 RX ORDER — METOPROLOL SUCCINATE 25 MG/1
TABLET, EXTENDED RELEASE ORAL
Qty: 90 TABLET | Refills: 1 | Status: SHIPPED | OUTPATIENT
Start: 2018-05-11 | End: 2019-06-14 | Stop reason: SDUPTHER

## 2018-09-21 ENCOUNTER — OFFICE VISIT (OUTPATIENT)
Dept: INTERNAL MEDICINE CLINIC | Age: 58
End: 2018-09-21

## 2018-09-21 VITALS
SYSTOLIC BLOOD PRESSURE: 124 MMHG | WEIGHT: 190 LBS | OXYGEN SATURATION: 97 % | HEART RATE: 86 BPM | BODY MASS INDEX: 28.06 KG/M2 | DIASTOLIC BLOOD PRESSURE: 78 MMHG

## 2018-09-21 DIAGNOSIS — N60.09 SOLITARY CYST OF BREAST, UNSPECIFIED LATERALITY: Primary | ICD-10-CM

## 2018-09-21 PROCEDURE — G8598 ASA/ANTIPLAT THER USED: HCPCS | Performed by: NURSE PRACTITIONER

## 2018-09-21 PROCEDURE — G8427 DOCREV CUR MEDS BY ELIG CLIN: HCPCS | Performed by: NURSE PRACTITIONER

## 2018-09-21 PROCEDURE — 1036F TOBACCO NON-USER: CPT | Performed by: NURSE PRACTITIONER

## 2018-09-21 PROCEDURE — G8417 CALC BMI ABV UP PARAM F/U: HCPCS | Performed by: NURSE PRACTITIONER

## 2018-09-21 PROCEDURE — 3017F COLORECTAL CA SCREEN DOC REV: CPT | Performed by: NURSE PRACTITIONER

## 2018-09-21 PROCEDURE — 99213 OFFICE O/P EST LOW 20 MIN: CPT | Performed by: NURSE PRACTITIONER

## 2018-10-01 ENCOUNTER — HOSPITAL ENCOUNTER (OUTPATIENT)
Age: 58
Setting detail: OUTPATIENT SURGERY
Discharge: HOME | End: 2018-10-01
Attending: INTERNAL MEDICINE | Admitting: INTERNAL MEDICINE
Payer: MEDICAID

## 2018-10-01 ENCOUNTER — ANESTHESIA EVENT (OUTPATIENT)
Dept: ENDOSCOPY | Age: 58
End: 2018-10-01
Payer: MEDICAID

## 2018-10-01 ENCOUNTER — ANESTHESIA (OUTPATIENT)
Dept: ENDOSCOPY | Age: 58
End: 2018-10-01
Payer: MEDICAID

## 2018-10-01 VITALS
DIASTOLIC BLOOD PRESSURE: 95 MMHG | SYSTOLIC BLOOD PRESSURE: 138 MMHG | TEMPERATURE: 97.5 F | HEIGHT: 69 IN | BODY MASS INDEX: 26.66 KG/M2 | OXYGEN SATURATION: 98 % | WEIGHT: 180 LBS | RESPIRATION RATE: 16 BRPM | HEART RATE: 65 BPM

## 2018-10-01 VITALS — SYSTOLIC BLOOD PRESSURE: 106 MMHG | OXYGEN SATURATION: 99 % | DIASTOLIC BLOOD PRESSURE: 72 MMHG

## 2018-10-01 PROCEDURE — 2500000003 HC RX 250 WO HCPCS: Performed by: NURSE ANESTHETIST, CERTIFIED REGISTERED

## 2018-10-01 PROCEDURE — 3700000000 HC ANESTHESIA ATTENDED CARE: Performed by: INTERNAL MEDICINE

## 2018-10-01 PROCEDURE — 2580000003 HC RX 258: Performed by: NURSE ANESTHETIST, CERTIFIED REGISTERED

## 2018-10-01 PROCEDURE — 7100000011 HC PHASE II RECOVERY - ADDTL 15 MIN: Performed by: INTERNAL MEDICINE

## 2018-10-01 PROCEDURE — 2709999900 HC NON-CHARGEABLE SUPPLY: Performed by: INTERNAL MEDICINE

## 2018-10-01 PROCEDURE — 7100000010 HC PHASE II RECOVERY - FIRST 15 MIN: Performed by: INTERNAL MEDICINE

## 2018-10-01 PROCEDURE — 3609027000 HC COLONOSCOPY: Performed by: INTERNAL MEDICINE

## 2018-10-01 PROCEDURE — 6360000002 HC RX W HCPCS: Performed by: NURSE ANESTHETIST, CERTIFIED REGISTERED

## 2018-10-01 PROCEDURE — 3700000001 HC ADD 15 MINUTES (ANESTHESIA): Performed by: INTERNAL MEDICINE

## 2018-10-01 RX ORDER — LIDOCAINE HYDROCHLORIDE 20 MG/ML
INJECTION, SOLUTION INFILTRATION; PERINEURAL PRN
Status: DISCONTINUED | OUTPATIENT
Start: 2018-10-01 | End: 2018-10-01 | Stop reason: SDUPTHER

## 2018-10-01 RX ORDER — SODIUM CHLORIDE 9 MG/ML
INJECTION, SOLUTION INTRAVENOUS CONTINUOUS
Status: DISCONTINUED | OUTPATIENT
Start: 2018-10-01 | End: 2018-10-01 | Stop reason: HOSPADM

## 2018-10-01 RX ORDER — PROPOFOL 10 MG/ML
INJECTION, EMULSION INTRAVENOUS PRN
Status: DISCONTINUED | OUTPATIENT
Start: 2018-10-01 | End: 2018-10-01 | Stop reason: SDUPTHER

## 2018-10-01 RX ORDER — SODIUM CHLORIDE 9 MG/ML
INJECTION, SOLUTION INTRAVENOUS CONTINUOUS PRN
Status: DISCONTINUED | OUTPATIENT
Start: 2018-10-01 | End: 2018-10-01 | Stop reason: SDUPTHER

## 2018-10-01 RX ADMIN — LIDOCAINE HYDROCHLORIDE 50 MG: 20 INJECTION, SOLUTION INFILTRATION; PERINEURAL at 12:25

## 2018-10-01 RX ADMIN — SODIUM CHLORIDE: 9 INJECTION, SOLUTION INTRAVENOUS at 12:03

## 2018-10-01 RX ADMIN — PROPOFOL 200 MG: 10 INJECTION, EMULSION INTRAVENOUS at 12:25

## 2018-10-01 ASSESSMENT — PAIN SCALES - GENERAL
PAINLEVEL_OUTOF10: 0

## 2018-10-01 ASSESSMENT — PAIN - FUNCTIONAL ASSESSMENT: PAIN_FUNCTIONAL_ASSESSMENT: 0-10

## 2018-11-30 RX ORDER — OMEPRAZOLE 20 MG/1
CAPSULE, DELAYED RELEASE ORAL
Qty: 30 CAPSULE | Refills: 4 | Status: SHIPPED | OUTPATIENT
Start: 2018-11-30 | End: 2019-05-06 | Stop reason: SDUPTHER

## 2019-01-28 ENCOUNTER — OFFICE VISIT (OUTPATIENT)
Dept: INTERNAL MEDICINE CLINIC | Age: 59
End: 2019-01-28
Payer: MEDICAID

## 2019-01-28 VITALS
SYSTOLIC BLOOD PRESSURE: 138 MMHG | HEART RATE: 88 BPM | WEIGHT: 202 LBS | OXYGEN SATURATION: 99 % | DIASTOLIC BLOOD PRESSURE: 90 MMHG | BODY MASS INDEX: 29.83 KG/M2

## 2019-01-28 DIAGNOSIS — M54.50 CHRONIC BILATERAL LOW BACK PAIN WITHOUT SCIATICA: Primary | ICD-10-CM

## 2019-01-28 DIAGNOSIS — G89.29 CHRONIC BILATERAL LOW BACK PAIN WITHOUT SCIATICA: Primary | ICD-10-CM

## 2019-01-28 PROCEDURE — G8484 FLU IMMUNIZE NO ADMIN: HCPCS | Performed by: NURSE PRACTITIONER

## 2019-01-28 PROCEDURE — 3017F COLORECTAL CA SCREEN DOC REV: CPT | Performed by: NURSE PRACTITIONER

## 2019-01-28 PROCEDURE — G8598 ASA/ANTIPLAT THER USED: HCPCS | Performed by: NURSE PRACTITIONER

## 2019-01-28 PROCEDURE — G8417 CALC BMI ABV UP PARAM F/U: HCPCS | Performed by: NURSE PRACTITIONER

## 2019-01-28 PROCEDURE — 99213 OFFICE O/P EST LOW 20 MIN: CPT | Performed by: NURSE PRACTITIONER

## 2019-01-28 PROCEDURE — G8427 DOCREV CUR MEDS BY ELIG CLIN: HCPCS | Performed by: NURSE PRACTITIONER

## 2019-01-28 PROCEDURE — 1036F TOBACCO NON-USER: CPT | Performed by: NURSE PRACTITIONER

## 2019-01-28 RX ORDER — IBUPROFEN 800 MG/1
800 TABLET ORAL
Qty: 270 TABLET | Refills: 1 | Status: SHIPPED | OUTPATIENT
Start: 2019-01-28 | End: 2020-02-05

## 2019-01-28 ASSESSMENT — ENCOUNTER SYMPTOMS
EYES NEGATIVE: 1
BACK PAIN: 1
RESPIRATORY NEGATIVE: 1

## 2019-02-13 RX ORDER — CLOPIDOGREL BISULFATE 75 MG/1
TABLET ORAL
Qty: 30 TABLET | Refills: 1 | Status: SHIPPED | OUTPATIENT
Start: 2019-02-13 | End: 2019-04-11 | Stop reason: SDUPTHER

## 2019-02-13 RX ORDER — LISINOPRIL 20 MG/1
TABLET ORAL
Qty: 30 TABLET | Refills: 1 | Status: SHIPPED | OUTPATIENT
Start: 2019-02-13 | End: 2019-04-11 | Stop reason: SDUPTHER

## 2019-03-11 RX ORDER — ASPIRIN 81 MG/1
TABLET, CHEWABLE ORAL
Qty: 30 TABLET | Refills: 2 | Status: SHIPPED | OUTPATIENT
Start: 2019-03-11 | End: 2019-07-16 | Stop reason: SDUPTHER

## 2019-03-11 RX ORDER — TIZANIDINE 4 MG/1
TABLET ORAL
Qty: 30 TABLET | Refills: 0 | Status: SHIPPED | OUTPATIENT
Start: 2019-03-11 | End: 2019-05-02 | Stop reason: SDUPTHER

## 2019-04-08 ENCOUNTER — OFFICE VISIT (OUTPATIENT)
Dept: INTERNAL MEDICINE CLINIC | Age: 59
End: 2019-04-08
Payer: MEDICAID

## 2019-04-08 VITALS
BODY MASS INDEX: 29.26 KG/M2 | TEMPERATURE: 97.9 F | RESPIRATION RATE: 16 BRPM | WEIGHT: 204.4 LBS | OXYGEN SATURATION: 97 % | DIASTOLIC BLOOD PRESSURE: 66 MMHG | HEART RATE: 88 BPM | SYSTOLIC BLOOD PRESSURE: 120 MMHG | HEIGHT: 70 IN

## 2019-04-08 DIAGNOSIS — D64.9 ANEMIA, UNSPECIFIED TYPE: ICD-10-CM

## 2019-04-08 DIAGNOSIS — Z12.5 PROSTATE CANCER SCREENING: ICD-10-CM

## 2019-04-08 DIAGNOSIS — M17.0 PRIMARY OSTEOARTHRITIS OF BOTH KNEES: ICD-10-CM

## 2019-04-08 DIAGNOSIS — E78.00 HYPERCHOLESTEREMIA: ICD-10-CM

## 2019-04-08 DIAGNOSIS — Z86.73 HISTORY OF CVA IN ADULTHOOD: ICD-10-CM

## 2019-04-08 DIAGNOSIS — I10 ESSENTIAL HYPERTENSION: ICD-10-CM

## 2019-04-08 DIAGNOSIS — E55.9 VITAMIN D DEFICIENCY: ICD-10-CM

## 2019-04-08 DIAGNOSIS — F51.04 CHRONIC INSOMNIA: ICD-10-CM

## 2019-04-08 DIAGNOSIS — R73.03 PRE-DIABETES: ICD-10-CM

## 2019-04-08 DIAGNOSIS — M54.32 LEFT SIDED SCIATICA: Primary | ICD-10-CM

## 2019-04-08 LAB
A/G RATIO: 1.8 (ref 1.1–2.2)
ALBUMIN SERPL-MCNC: 4.4 G/DL (ref 3.4–5)
ALP BLD-CCNC: 83 U/L (ref 40–129)
ALT SERPL-CCNC: 36 U/L (ref 10–40)
ANION GAP SERPL CALCULATED.3IONS-SCNC: 12 MMOL/L (ref 3–16)
AST SERPL-CCNC: 28 U/L (ref 15–37)
BILIRUB SERPL-MCNC: 0.4 MG/DL (ref 0–1)
BUN BLDV-MCNC: 11 MG/DL (ref 7–20)
CALCIUM SERPL-MCNC: 9.5 MG/DL (ref 8.3–10.6)
CHLORIDE BLD-SCNC: 107 MMOL/L (ref 99–110)
CHOLESTEROL, TOTAL: 95 MG/DL (ref 0–199)
CO2: 25 MMOL/L (ref 21–32)
CREAT SERPL-MCNC: 0.9 MG/DL (ref 0.9–1.3)
GFR AFRICAN AMERICAN: >60
GFR NON-AFRICAN AMERICAN: >60
GLOBULIN: 2.5 G/DL
GLUCOSE BLD-MCNC: 94 MG/DL (ref 70–99)
HCT VFR BLD CALC: 37.1 % (ref 40.5–52.5)
HDLC SERPL-MCNC: 35 MG/DL (ref 40–60)
HEMOGLOBIN: 12.4 G/DL (ref 13.5–17.5)
LDL CHOLESTEROL CALCULATED: 10 MG/DL
MCH RBC QN AUTO: 30.2 PG (ref 26–34)
MCHC RBC AUTO-ENTMCNC: 33.4 G/DL (ref 31–36)
MCV RBC AUTO: 90.4 FL (ref 80–100)
PDW BLD-RTO: 14.2 % (ref 12.4–15.4)
PLATELET # BLD: 332 K/UL (ref 135–450)
PMV BLD AUTO: 9 FL (ref 5–10.5)
POTASSIUM SERPL-SCNC: 4.2 MMOL/L (ref 3.5–5.1)
PROSTATE SPECIFIC ANTIGEN: 0.74 NG/ML (ref 0–4)
RBC # BLD: 4.1 M/UL (ref 4.2–5.9)
SODIUM BLD-SCNC: 144 MMOL/L (ref 136–145)
TOTAL PROTEIN: 6.9 G/DL (ref 6.4–8.2)
TRIGL SERPL-MCNC: 252 MG/DL (ref 0–150)
VITAMIN D 25-HYDROXY: 25.5 NG/ML
VLDLC SERPL CALC-MCNC: 50 MG/DL
WBC # BLD: 7.3 K/UL (ref 4–11)

## 2019-04-08 PROCEDURE — G8417 CALC BMI ABV UP PARAM F/U: HCPCS | Performed by: INTERNAL MEDICINE

## 2019-04-08 PROCEDURE — G8598 ASA/ANTIPLAT THER USED: HCPCS | Performed by: INTERNAL MEDICINE

## 2019-04-08 PROCEDURE — 1036F TOBACCO NON-USER: CPT | Performed by: INTERNAL MEDICINE

## 2019-04-08 PROCEDURE — 3017F COLORECTAL CA SCREEN DOC REV: CPT | Performed by: INTERNAL MEDICINE

## 2019-04-08 PROCEDURE — G8427 DOCREV CUR MEDS BY ELIG CLIN: HCPCS | Performed by: INTERNAL MEDICINE

## 2019-04-08 PROCEDURE — 36415 COLL VENOUS BLD VENIPUNCTURE: CPT | Performed by: INTERNAL MEDICINE

## 2019-04-08 PROCEDURE — 99214 OFFICE O/P EST MOD 30 MIN: CPT | Performed by: INTERNAL MEDICINE

## 2019-04-08 RX ORDER — PREDNISONE 20 MG/1
20 TABLET ORAL DAILY
Qty: 10 TABLET | Refills: 0 | Status: SHIPPED | OUTPATIENT
Start: 2019-04-08 | End: 2019-04-18

## 2019-04-08 RX ORDER — RAMELTEON 8 MG/1
8 TABLET ORAL NIGHTLY
Qty: 30 TABLET | Refills: 3 | Status: SHIPPED | OUTPATIENT
Start: 2019-04-08 | End: 2019-09-10 | Stop reason: ALTCHOICE

## 2019-04-08 ASSESSMENT — ENCOUNTER SYMPTOMS
SHORTNESS OF BREATH: 0
COUGH: 0
BACK PAIN: 1
VOMITING: 0
CONSTIPATION: 0
BLOOD IN STOOL: 0
NAUSEA: 0
DIARRHEA: 1
ROS SKIN COMMENTS: NO CONCERNING SKIN LESIONS
ABDOMINAL PAIN: 0

## 2019-04-08 ASSESSMENT — PATIENT HEALTH QUESTIONNAIRE - PHQ9: DEPRESSION UNABLE TO ASSESS: PT REFUSES

## 2019-04-08 NOTE — PATIENT INSTRUCTIONS
Please call your pharmacy if you need any refills of your medication(s). Please call our office at [phone] if you don't hear from us about your test results. Bring an accurate list of your medications with you at every appointment to ensure that we have the correct information.     Our office hours are: Monday - Friday 8:30 am- 5 pm    Phone lines turn on at 8:30 am

## 2019-04-08 NOTE — PROGRESS NOTES
SUBJECTIVE:  Gume Luna is a 64 y.o. male being evaluated for:    Chief Complaint   Patient presents with    New Patient     New Patient here to get established. Previous pcp Dr Elba Lopez. HPI   Here to become established and no particular complaints or concerns  Having trouble getting appointments  Not sure who is normal doctor is  Lives closer to here     Issues with his back Pain goes down both legs greater on the left  Knee on the left swollen and doing better     Chronic insomnia and previously took belsomra and trazodone and did not help     No Known Allergies  Current Outpatient Medications   Medication Sig Dispense Refill    predniSONE (DELTASONE) 20 MG tablet Take 1 tablet by mouth daily for 10 days 10 tablet 0    ramelteon (ROZEREM) 8 MG tablet Take 1 tablet by mouth nightly 30 tablet 3    tiZANidine (ZANAFLEX) 4 MG tablet TAKE ONE TABLET BY MOUTH THREE TIMES A DAY 30 tablet 0    aspirin 81 MG chewable tablet CHEW ONE TABLET BY MOUTH DAILY 30 tablet 2    ibuprofen (ADVIL;MOTRIN) 800 MG tablet Take 1 tablet by mouth 3 times daily (with meals) 270 tablet 1    omeprazole (PRILOSEC) 20 MG delayed release capsule TAKE ONE CAPSULE BY MOUTH DAILY 30 capsule 4    atorvastatin (LIPITOR) 40 MG tablet Take 1 tablet by mouth daily 90 tablet 2    cetirizine (ZYRTEC) 10 MG tablet TAKE ONE TABLET BY MOUTH DAILY 30 tablet 11    metoprolol succinate (TOPROL XL) 25 MG extended release tablet TAKE ONE-HALF TABLET BY MOUTH DAILY 90 tablet 1    cyanocobalamin ([employer] VITAMIN B12) 1000 MCG tablet Take 1 tablet by mouth daily 30 tablet 2    clopidogrel (PLAVIX) 75 MG tablet TAKE ONE TABLET BY MOUTH DAILY 30 tablet 5    lisinopril (PRINIVIL;ZESTRIL) 20 MG tablet TAKE ONE TABLET BY MOUTH DAILY 30 tablet 5    zolpidem (AMBIEN) 5 MG tablet Take 1 tablet by mouth nightly as needed for Sleep for up to 30 days. 30 tablet 0    Emollient (AQUAPHILIC) OINT Apply daily to dry skin.  454 g 5     No current He has 3 brothers, 1 sister - has a good relationship with them     Dropped out of school 10th grade since he was doing poorly, struggled with math, and wanted to seek employment. Past Medical History:   Diagnosis Date    Alcohol abuse, in remission     2016 quit     Alcohol abuse, in remission     Arthritis     rt hip, hands    Asthma     Back pain     Cerebral artery occlusion with cerebral infarction ([facility] [address]     COPD (chronic obstructive pulmonary disease) (HCC)     beatter since quit smoking in 2017    CVA (cerebral infarction) 4/28/13    affected L side     Depression     GERD (gastroesophageal reflux disease)     Hyperlipidemia     Hypertension     Insomnia     MRSA carrier     very long time ago, more than one year    Paresthesia of left leg     chronic problem    Paresthesia of right leg     Psoriasis (a type of skin inflammation)     Sciatic nerve pain, left     Sciatic nerve pain, right     Smoker     1.5 ppd     Past Surgical History:   Procedure Laterality Date    ABDOMEN SURGERY      APPENDECTOMY      ruptured     BACK SURGERY      maybe a fusion, L4 L5 herniated disc    CHOLECYSTECTOMY, LAPAROSCOPIC  2/13/2015    LAPAROSCOPIC CHOLECYSTECTOMY WITH CHOLANGIOGRAM, LYSIS OF    COLONOSCOPY  10/01/2018    TX COLONOSCOPY FLX DX W/COLLJ SPEC WHEN PFRMD N/A 10/1/2018    COLONOSCOPY performed by Irineo Bahena MD at [address] [place] [address]  4/24/13    for ischemic bowel    TONSILLECTOMY       Family History   Problem Relation Age of Onset    Arthritis Mother     Heart Disease Mother         chf    Cancer Father         lung cancer     Heart Disease Father         CHF   both parents with chf   Review of Systems   Constitutional: Positive for activity change and unexpected weight change (slowly gaining ). Negative for fatigue and fever. HENT: Negative for congestion and nosebleeds. Eyes: Negative for visual disturbance.    Respiratory: Negative for cough and shortness of breath. Cardiovascular: Negative for chest pain, palpitations and leg swelling. Gastrointestinal: Positive for diarrhea (chronic since surgery ). Negative for abdominal pain, blood in stool, constipation, nausea and vomiting. Genitourinary: Negative for difficulty urinating. Musculoskeletal: Positive for arthralgias, back pain, gait problem and joint swelling. Leg cramps  On muscle relaxers helping    Skin:        No concerning skin lesions    Neurological: Negative for dizziness, light-headedness and headaches. Paraesthesias  down both his legs   Psychiatric/Behavioral: Positive for sleep disturbance. Negative for dysphoric mood. OBJECTIVE:  /66 (Site: Left Upper Arm, Position: Sitting, Cuff Size: Large Adult)   Pulse 88   Temp 97.9 °F (36.6 °C) (Oral)   Resp 16   Ht 5' 10\" (1.778 m)   Wt 204 lb 6.4 oz (92.7 kg)   SpO2 97%   BMI 29.33 kg/m²      Body mass index is 29.33 kg/m². Physical Exam   Constitutional: He is oriented to person, place, and time. He appears well-developed and well-nourished. No distress. HENT:   Head: Normocephalic and atraumatic. Right Ear: External ear normal.   Left Ear: External ear normal.   Mouth/Throat: Oropharynx is clear and moist.   TM clear    Eyes: Conjunctivae are normal.   Neck: Neck supple. No thyromegaly present. Cardiovascular: Normal rate, regular rhythm, normal heart sounds and intact distal pulses. Exam reveals no gallop and no friction rub. No murmur heard. Pulmonary/Chest: Effort normal and breath sounds normal. He exhibits no tenderness. Abdominal: Soft. He exhibits no distension. There is no tenderness. No HSM   Musculoskeletal: He exhibits tenderness (across low back ). He exhibits no edema or deformity. Decreased flexion and extension of back    Lymphadenopathy:     He has no cervical adenopathy. Neurological: He is alert and oriented to person, place, and time.  He displays normal reflexes. Coordination normal.   Skin: Skin is warm and dry. No rash noted. Psychiatric: He has a normal mood and affect. His behavior is normal.   Nursing note and vitals reviewed. ASSESSMENT/PLAN:    Em Kumar was seen today for new patient. Diagnoses and all orders for this visit:    Left sided sciatica prolonged pain with paresthesias down his legs. X-rays with degenerative disc disease with spurring at L4/L5 and L5/S1 which may result in neural encroachment. Has been on anti-inflammatories and intermittent pain pills per the record   -     MRI LUMBAR SPINE [address]; Future  -     predniSONE (DELTASONE) 20 MG tablet; Take 1 tablet by mouth daily for 10 days  -     CBC    Primary osteoarthritis of both knees  -     predniSONE (DELTASONE) 20 MG tablet; Take 1 tablet by mouth daily for 10 days  -     CBC    History of CVA in adulthood  -     Lipid Panel  -     Comprehensive Metabolic Panel    Essential hypertension well-controlled   -     Comprehensive Metabolic Panel    Hypercholesteremia  -     Lipid Panel  -     Comprehensive Metabolic Panel    Pre-diabetes  -     Hemoglobin A1C    Anemia, unspecified type  -     Iron and TIBC; Future  -     Ferritin; Future    Vitamin D deficiency  -     Vitamin D 25 Hydroxy    Chronic insomnia  -     ramelteon (ROZEREM) 8 MG tablet; Take 1 tablet by mouth nightly    Prostate cancer screening  -     Psa screening        Orders Placed This Encounter   Medications    predniSONE (DELTASONE) 20 MG tablet     Sig: Take 1 tablet by mouth daily for 10 days     Dispense:  10 tablet     Refill:  0    ramelteon (ROZEREM) 8 MG tablet     Sig: Take 1 tablet by mouth nightly     Dispense:  30 tablet     Refill:  3        Return in about 3 months (around 7/8/2019), or if symptoms worsen or fail to improve. Patient Instructions   Please call your pharmacy if you need any refills of your medication(s).     Please call our office at [phone] if you don't hear from us about your test results. Bring an accurate list of your medications with you at every appointment to ensure that we have the correct information.     Our office hours are: Monday - Friday 8:30 am- 5 pm    Phone lines turn on at 8:30 am

## 2019-04-09 DIAGNOSIS — D64.9 ANEMIA, UNSPECIFIED TYPE: ICD-10-CM

## 2019-04-09 DIAGNOSIS — F51.01 PRIMARY INSOMNIA: Primary | ICD-10-CM

## 2019-04-09 LAB
ESTIMATED AVERAGE GLUCOSE: 131.2 MG/DL
FERRITIN: 67.7 NG/ML (ref 30–400)
HBA1C MFR BLD: 6.2 %
IRON SATURATION: 24 % (ref 20–50)
IRON: 97 UG/DL (ref 59–158)
TOTAL IRON BINDING CAPACITY: 402 UG/DL (ref 260–445)

## 2019-04-09 RX ORDER — ZOLPIDEM TARTRATE 5 MG/1
5 TABLET ORAL NIGHTLY PRN
Qty: 30 TABLET | Refills: 0 | Status: SHIPPED | OUTPATIENT
Start: 2019-04-09 | End: 2019-04-22 | Stop reason: DRUGHIGH

## 2019-04-10 ENCOUNTER — TELEPHONE (OUTPATIENT)
Dept: INTERNAL MEDICINE CLINIC | Age: 59
End: 2019-04-10

## 2019-04-10 NOTE — TELEPHONE ENCOUNTER
----- Message from Yesenia Delgado MD sent at 4/9/2019  6:44 PM EDT -----  Insurance company denied the Rozerem. There wants to try Ambien.  I sent in Ambien 5 mg. 2 use nightly as needed

## 2019-04-10 NOTE — TELEPHONE ENCOUNTER
[facility] already [facility] this up.

## 2019-04-11 ENCOUNTER — TELEPHONE (OUTPATIENT)
Dept: INTERNAL MEDICINE CLINIC | Age: 59
End: 2019-04-11

## 2019-04-11 RX ORDER — LISINOPRIL 20 MG/1
TABLET ORAL
Qty: 30 TABLET | Refills: 5 | Status: SHIPPED
Start: 2019-04-11 | End: 2020-03-10 | Stop reason: DRUGHIGH

## 2019-04-11 RX ORDER — CLOPIDOGREL BISULFATE 75 MG/1
TABLET ORAL
Qty: 30 TABLET | Refills: 5 | Status: SHIPPED | OUTPATIENT
Start: 2019-04-11 | End: 2019-12-19 | Stop reason: SDUPTHER

## 2019-04-11 NOTE — TELEPHONE ENCOUNTER
Let Joie Campoverde know that  the chart is done.  Let patient know that we are attempting to do a prior authorization for the test but we may need to do physical therapy or refer to the spine clinic

## 2019-04-22 ENCOUNTER — TELEPHONE (OUTPATIENT)
Dept: INTERNAL MEDICINE CLINIC | Age: 59
End: 2019-04-22

## 2019-04-22 DIAGNOSIS — F51.01 PRIMARY INSOMNIA: Primary | ICD-10-CM

## 2019-04-22 RX ORDER — ZOLPIDEM TARTRATE 10 MG/1
10 TABLET ORAL NIGHTLY PRN
Qty: 30 TABLET | Refills: 0 | Status: SHIPPED | OUTPATIENT
Start: 2019-04-22 | End: 2019-05-23 | Stop reason: SDUPTHER

## 2019-04-22 NOTE — TELEPHONE ENCOUNTER
You can certainly see chiropractic and see if they help, however if he wants to do an MRI we've probably still need to do therapy. I'll let him decide what he wants to do.  We can increase the Ambien to 10 mg daily, we will need to see him in 2 months for follow-up on this

## 2019-04-24 ENCOUNTER — TELEPHONE (OUTPATIENT)
Dept: INTERNAL MEDICINE CLINIC | Age: 59
End: 2019-04-24

## 2019-04-24 DIAGNOSIS — M54.32 LEFT SIDED SCIATICA: Primary | ICD-10-CM

## 2019-04-24 NOTE — TELEPHONE ENCOUNTER
Voice mail not set up @ [phone]. Will keep trying. If pt calls back please give the information below to schedule.     [facility]  [facility] [facility]  [phone]

## 2019-04-29 ENCOUNTER — HOSPITAL ENCOUNTER (OUTPATIENT)
Dept: PHYSICAL THERAPY | Age: 59
Setting detail: THERAPIES SERIES
Discharge: HOME | End: 2019-04-29
Payer: MEDICAID

## 2019-04-29 PROCEDURE — G0283 ELEC STIM OTHER THAN WOUND: HCPCS

## 2019-04-29 PROCEDURE — 97140 MANUAL THERAPY 1/> REGIONS: CPT

## 2019-04-29 PROCEDURE — 97161 PT EVAL LOW COMPLEX 20 MIN: CPT

## 2019-04-29 ASSESSMENT — PAIN SCALES - QUEBEC BACK PAIN DISABILITY SCALE
BEND OVER TO CLEAN THE BATHTUB: 4
PUT ON SOCKS OR PANYHOSE: 5
WALK A FEW BLOCKS OR 300 TO 400M: 5
QUEBEC CMS MODIFIER: CM
LIFT AND CARRY A HEAVY SUITCASE: 5
MOVE A CHAIR: 3
CLIMB ONE FLIGHT OF STAIRS: 4
GET OUT OF BED: 4
MAKE YOUR BED: 5
TOTAL SCORE: 79
TAKE FOOD OUT OF THE REFRIGERATOR: 3
CARRY TWO BAGS OF GROCERIES: 3
RUN ONE BLOCK OR 100M: 5
STAND UP FOR 20 TO 30 MINUTES: 4
SLEEP THROUGH THE NIGHT: 3
THROW A BALL: 4
REACH UP TO HIGH SHELVES: 4
QUEBEC DISABILITY INDEX: 80-99%
SIT IN A CHAIR FOR SEVERAL HOURS: 4
TURN OVER IN BED: 3
PULL OR PUSH HEAVY DOORS: 5
RIDE IN A CAR: 2
WALK SEVERAL KILOMETERS  OR MILES: 4

## 2019-04-29 NOTE — FLOWSHEET NOTE
Physical Therapy Daily Treatment Note  Date:  2019    Patient Name:  Al Duenas    :  1960  MRN: [id number]  Restrictions/Precautions:    Pertinent Medical History:  Medical/Treatment Diagnosis Information:  · Diagnosis: Left sided sciatica (M54.32)  · Treatment Diagnosis: Decreased ADL status  Insurance/Certification information:  PT Insurance Information: [facility]  Physician Information:  Referring Practitioner: Dr. Tracy Mcgraw of care signed (Y/N):  Sent to in basket on 19  Visit# / total visits:    Pain level: 2-10/10     Progress Note: []  Yes  []  No  Next due by: Visit #10      History of Injury:Pt c/o back and R hip pain and sometimes the pain moves \"all the way down to both of my feet. \"  Pt stated he has pre-diabetes and his feet pain might be from diabetes, he did not know. Pt rated back pain 10/10 in the early morning when getting out of bed. Pt rated pain 3/10 during the day and 2/10 at night. But, \"all of a sudden it changed and the hip stopped bothering me probably last month into this month. \"  But, back pain continues at the central low back and \"into my butt cheek\", sometimes L and sometimes R.  Pt stated his back pain started in the . Pt had surgery for \"L4/L5, they did something to my discs, I don't know. \"        Subjective:       Objective:  Observation:   Test measurements:    19  Posture: flexed posture. TTP: B lumbar paraspinals. Observation: Gait: decreased R weight shift, decreased L trunk rotation. AROM lumbar: flexion decreased 25%, low back pain central, L and R; extension to neutral, no pain increase; SB L WFL, no pain; SB R absent, \"a little bit of tension. \"  Edema: (-) B Babinski and clonus; DTR B quads 2/3, Achilles 0/3; (-) dural tension test B. Scar: Sensation: intact B LE dermatones L2-L5.   Strength RLE: WNL  Strength LLE: WNL      Exercises:  Exercise/Equipment Resistance/Repetitions Other comments Other Therapeutic Activities:      Home Exercise Program:      Manual Treatments: STM B lumbar paraspinals 14'     Modalities:  IFC with HP 15' lumbar in hook lying    Timed Code Treatment Minutes:  20    Total Treatment Minutes:  60    Assessment  [] Patient tolerated treatment well [] Patient limited by fatigue  [x] Patient limited by pain  [] Patient limited by other medical complications  [] Other:         Prognosis: [x] Good [] Fair  [] Poor    Patient Requires Follow-up: [x] Yes  [] No    Plan:   [] Continue per plan of care [] Alter current plan (see comments)  [x] Plan of care initiated [] Hold pending MD visit [] Discharge  Plan for Next Session: begin HEP with ZEINAB TOTH, genoveva, march     Electronically signed by:  Ina Fisher PT

## 2019-04-29 NOTE — PROGRESS NOTES
Outpatient Physical Therapy  [] [facility]    Phone: [phone]   Fax: [phone]   [] [facility]  Phone: [phone]              Fax: [phone]  [x] [facility]   Phone: [phone]   Fax: [phone]     To: Referring Practitioner: Dr. Mana Verma      Patient: Stacey Dukes   : 1960   MRN: [id number]  Evaluation Date: 2019      Diagnosis Information:  · Diagnosis: Left sided sciatica (M54.32)   · Treatment Diagnosis: Decreased ADL status     Physical Therapy Certification/Re-Certification Form  Dear Dr. Delia Topete,  The following patient has been evaluated for physical therapy services and for therapy to continue, Medicare requires monthly physician review of the treatment plan. Please review the attached evaluation and/or summary of the patient's plan of care, and verify that you agree therapy should continue by signing the attached document and sending it back to our office. Plan of Care/Treatment to date:  [x] Therapeutic Exercise    [x] Modalities:  [] Therapeutic Activity     [] Ultrasound  [] Electrical Stimulation  [] Gait Training      [] Cervical Traction [] Lumbar Traction  [] Neuromuscular Re-education    [] Cold/hotpack [] Iontophoresis   [x] Instruction in HEP     Other:  [x] Manual Therapy      []             [] Aquatic Therapy      []           ? Frequency/Duration:  # Days per week: [x] 1 day # Weeks: [] 1 week [] 5 weeks     [] 2 days? [] 2 weeks [] 6 weeks     [x] 3 days   [] 3 weeks [] 7 weeks     [] 4 days   [x] 4 weeks [] 8 weeks    Rehab Potential: [] Excellent [x] Good [] Fair  [] Poor       Electronically signed by:  Larry Gan PT      If you have any questions or concerns, please don't hesitate to call.   Thank you for your referral.      Physician Signature:________________________________Date:__________________  By signing above, therapists plan is approved by physician

## 2019-04-29 NOTE — PROGRESS NOTES
Physical Therapy  Initial Assessment  Date: 2019  Patient Name: Latoya Atkinson  MRN: [id number]  : 1960     Treatment Diagnosis: Decreased ADL status    Restrictions  Restrictions/Precautions  Restrictions/Precautions: Fall Risk(none)    Subjective   General  Referring Practitioner: Dr. Jazmin Rogers  Referral Date : 19  Diagnosis: Left sided sciatica (M54.32)  General Comment  Comments: Pt denied numbness and tingling. Although the next statement pt said \"my feet are numb. .. my (R) toes I can't feel at times. \"  PT Visit Information  Onset Date: 90  PT Insurance Information: [address] Medicaid  Total # of Visits Approved: 12  Total # of Visits to Date: 1  Subjective  Subjective: Pt c/o back and R hip pain and sometimes the pain moves \"all the way down to both of my feet. \"  Pt stated he has pre-diabetes and his feet pain might be from diabetes, he did not know. Pt rated back pain 10/10 in the early morning when getting out of bed. Pt rated pain 3/10 during the day and 2/10 at night. But, \"all of a sudden it changed and the hip stopped bothering me probably last month into this month. \"  But, back pain continues at the central low back and \"into my butt cheek\", sometimes L and sometimes R.  Pt stated his back pain started in the . Pt had surgery for \"L4/L5, they did something to my discs, I don't know. \"       Social/Functional History  Social/Functional History  Lives With: Alone  ADL Assistance: Independent  Homemaking Assistance: Independent  Active : Yes  Occupation: On disability  Leisure & Hobbies: nothing    Objective    Posture: flexed posture. TTP: B lumbar paraspinals. Observation: Gait: decreased R weight shift, decreased L trunk rotation. AROM lumbar: flexion decreased 25%, low back pain central, L and R; extension to neutral, no pain increase; SB L WFL, no pain; SB R absent, \"a little bit of tension. \"  Edema: (-) B Babinski and clonus; DTR B quads 2/3, Achilles 0/3; (-) dural tension test B. Scar: Sensation: intact B LE dermatones L2-L5. Strength RLE: WNL  Strength LLE: WNL    Assessment   Conditions Requiring Skilled Therapeutic Intervention  Body structures, Functions, Activity limitations: Decreased functional mobility ; Decreased ADL status; Decreased ROM; Increased Pain  Assessment: Pt presented to PT with chronic lumbar pain and dysfunction. PLOF: independent, limited function  Treatment Diagnosis: Decreased ADL status  Prognosis: Good  Decision Making: Low Complexity  REQUIRES PT FOLLOW UP: Yes  Activity Tolerance  Activity Tolerance: Patient limited by pain         Plan   Plan  Times per week: PT 1-3x/week for 4 weeks  Current Treatment Recommendations: Strengthening, ROM, Manual Therapy - Soft Tissue Mobilization, Manual Therapy - Joint Manipulation, Modalities    OutComes Score  [place] Total Score: 79    Goals  Short term goals  Time Frame for Short term goals: 1 week  Short term goal 1: Pt will be independent with HEP. Long term goals  Time Frame for Long term goals : 4 - 6 weeks  Long term goal 1: Pt will rate low back pain 0-3/10. Long term goal 2: Pt will be able to flex forward to  light items from ground without aggravating back pain. Long term goal 3: Pt will no longer have lumbar paraspinal spasms B. Patient Goals   Patient goals : \"I hope my back and everything starts going back again. \"       Therapy Time   Individual Concurrent Group Co-treatment   Time In 1500         Time Out 1600         Minutes 60         Timed Code Treatment Minutes: Shaq Smith 169, PT

## 2019-05-01 ENCOUNTER — HOSPITAL ENCOUNTER (OUTPATIENT)
Dept: PHYSICAL THERAPY | Age: 59
Setting detail: THERAPIES SERIES
Discharge: HOME | End: 2019-05-01
Payer: MEDICAID

## 2019-05-01 NOTE — FLOWSHEET NOTE
Physical Therapy  Cancellation/No-show Note  Patient Name:  Latoya Atkinson  :  1960   Date:  2019  Cancelled visits to date: 1  No-shows to date: 0    For today's appointment patient:  [x]  Cancelled  []  Rescheduled appointment  []  No-show     Reason given by patient:  []  Patient ill  []  Conflicting appointment  []  No transportation    []  Conflict with work  []  No reason given  [x]  Other:     Comments:  Pt was stuck at another appointment    Electronically signed by:  Jose Antonio Claudio PT

## 2019-05-02 DIAGNOSIS — H69.81 EUSTACHIAN TUBE DYSFUNCTION, RIGHT: ICD-10-CM

## 2019-05-02 RX ORDER — TIZANIDINE 4 MG/1
TABLET ORAL
Qty: 90 TABLET | Refills: 2 | Status: SHIPPED | OUTPATIENT
Start: 2019-05-02 | End: 2019-11-21 | Stop reason: SDUPTHER

## 2019-05-02 RX ORDER — CETIRIZINE HYDROCHLORIDE 10 MG/1
TABLET ORAL
Qty: 30 TABLET | Refills: 5 | Status: SHIPPED | OUTPATIENT
Start: 2019-05-02 | End: 2019-11-21 | Stop reason: SDUPTHER

## 2019-05-06 ENCOUNTER — TELEPHONE (OUTPATIENT)
Dept: INTERNAL MEDICINE CLINIC | Age: 59
End: 2019-05-06

## 2019-05-06 ENCOUNTER — HOSPITAL ENCOUNTER (OUTPATIENT)
Dept: PHYSICAL THERAPY | Age: 59
Setting detail: THERAPIES SERIES
Discharge: HOME | End: 2019-05-06
Payer: MEDICAID

## 2019-05-06 PROCEDURE — 97140 MANUAL THERAPY 1/> REGIONS: CPT

## 2019-05-06 PROCEDURE — G0283 ELEC STIM OTHER THAN WOUND: HCPCS

## 2019-05-06 PROCEDURE — 97110 THERAPEUTIC EXERCISES: CPT

## 2019-05-06 RX ORDER — OMEPRAZOLE 20 MG/1
CAPSULE, DELAYED RELEASE ORAL
Qty: 30 CAPSULE | Refills: 3 | Status: SHIPPED | OUTPATIENT
Start: 2019-05-06 | End: 2019-09-02 | Stop reason: SDUPTHER

## 2019-05-06 NOTE — FLOWSHEET NOTE
Physical Therapy Daily Treatment Note  Date:  2019    Patient Name:  Homer Myers    :  1960  MRN: [id number]  Restrictions/Precautions:    Pertinent Medical History:  Medical/Treatment Diagnosis Information:  · Diagnosis: Left sided sciatica (M54.32)  · Treatment Diagnosis: Decreased ADL status  Insurance/Certification information:  PT Insurance Information: [facility]  Physician Information:  Referring Practitioner: Dr. Todd Dobson of care signed (Y/N):  Sent to in basket on 19  Visit# / total visits:    Pain level: 410     Progress Note: []  Yes  []  No  Next due by: Visit #10      History of Injury:Pt c/o back and R hip pain and sometimes the pain moves \"all the way down to both of my feet. \"  Pt stated he has pre-diabetes and his feet pain might be from diabetes, he did not know. Pt rated back pain 10/10 in the early morning when getting out of bed. Pt rated pain 3/10 during the day and 2/10 at night. But, \"all of a sudden it changed and the hip stopped bothering me probably last month into this month. \"  But, back pain continues at the central low back and \"into my butt cheek\", sometimes L and sometimes R.  Pt stated his back pain started in the . Pt had surgery for \"L4/L5, they did something to my discs, I don't know. \"        Subjective:   Not too bad today. It's hurting, but not like it was the other week. N/T in L leg    Objective:  Observation:   Test measurements:    19  Posture: flexed posture. TTP: B lumbar paraspinals. Observation: Gait: decreased R weight shift, decreased L trunk rotation. AROM lumbar: flexion decreased 25%, low back pain central, L and R; extension to neutral, no pain increase; SB L WFL, no pain; SB R absent, \"a little bit of tension. \"  Edema: (-) B Babinski and clonus; DTR B quads 2/3, Achilles 0/3; (-) dural tension test B. Scar: Sensation: intact B LE dermatones L2-L5.   Strength RLE: WNL  Strength LLE: WNL      Exercises:  Exercise/Equipment Resistance/Repetitions Other comments                                                                            Other Therapeutic Activities:      Home Exercise Program:  5/6/19 ZEINAB TOTH bridge TA with march. Issued written instructions with pictures.  Patient demonstrated exercises in the clinic    Manual Treatments: STM B lumbar paraspinals 14'     Modalities:  IFC with HP 15' lumbar in hook lying    Timed Code Treatment Minutes:  25    Total Treatment Minutes:  45    Assessment  [] Patient tolerated treatment well [] Patient limited by fatigue  [x] Patient limited by pain  [] Patient limited by other medical complications  [] Other:         Prognosis: [x] Good [] Fair  [] Poor    Patient Requires Follow-up: [x] Yes  [] No    Plan:   [x] Continue per plan of care [] Alter current plan (see comments)  [] Plan of care initiated [] Hold pending MD visit [] Discharge  Plan for Next Session:  Review HEP, as needed, ZEINAB TOTH bridge, march     Electronically signed by:  Anil Gasca, PTA [id number]

## 2019-05-06 NOTE — TELEPHONE ENCOUNTER
Pt needs a new rx on omeprazole (PRILOSEC) 20 MG delayed release capsule to be sent to [facility] in [place].

## 2019-05-08 ENCOUNTER — HOSPITAL ENCOUNTER (OUTPATIENT)
Dept: PHYSICAL THERAPY | Age: 59
Setting detail: THERAPIES SERIES
Discharge: HOME | End: 2019-05-08
Payer: MEDICAID

## 2019-05-08 RX ORDER — TIZANIDINE 4 MG/1
TABLET ORAL
Qty: 30 TABLET | Refills: 0 | OUTPATIENT
Start: 2019-05-08

## 2019-05-08 NOTE — FLOWSHEET NOTE
Physical Therapy  Cancellation/No-show Note  Patient Name:  Jessica Pleitez  :  1960   Date:  2019  Cancelled visits to date: 2  No-shows to date: 0    For today's appointment patient:  [x]  Cancelled  []  Rescheduled appointment  []  No-show     Reason given by patient:  [x]  Patient ill  []  Conflicting appointment  []  No transportation    []  Conflict with work  []  No reason given  []  Other:     Comments:      Electronically signed by:  Montse Diallo, PTA [id number]

## 2019-05-13 ENCOUNTER — HOSPITAL ENCOUNTER (OUTPATIENT)
Dept: PHYSICAL THERAPY | Age: 59
Setting detail: THERAPIES SERIES
Discharge: HOME | End: 2019-05-13
Payer: MEDICAID

## 2019-05-13 PROCEDURE — 97110 THERAPEUTIC EXERCISES: CPT

## 2019-05-13 PROCEDURE — 97140 MANUAL THERAPY 1/> REGIONS: CPT

## 2019-05-13 PROCEDURE — G0283 ELEC STIM OTHER THAN WOUND: HCPCS

## 2019-05-13 NOTE — FLOWSHEET NOTE
Physical Therapy Daily Treatment Note  Date:  2019    Patient Name:  Shaheed Arteaga    :  1960  MRN: [id number]  Restrictions/Precautions:    Pertinent Medical History:  Medical/Treatment Diagnosis Information:  · Diagnosis: Left sided sciatica (M54.32)  · Treatment Diagnosis: Decreased ADL status  Insurance/Certification information:  PT Insurance Information: [facility]  Physician Information:  Referring Practitioner: Dr. Eladia Isaac of care signed (Y/N):  Sent to in basket on 19  Visit# / total visits:  3/12  Pain level: 4/10     Progress Note: []  Yes  []  No  Next due by: Visit #10      History of Injury:Pt c/o back and R hip pain and sometimes the pain moves \"all the way down to both of my feet. \"  Pt stated he has pre-diabetes and his feet pain might be from diabetes, he did not know. Pt rated back pain 10/10 in the early morning when getting out of bed. Pt rated pain 3/10 during the day and 2/10 at night. But, \"all of a sudden it changed and the hip stopped bothering me probably last month into this month. \"  But, back pain continues at the central low back and \"into my butt cheek\", sometimes L and sometimes R.  Pt stated his back pain started in the . Pt had surgery for \"L4/L5, they did something to my discs, I don't know. \"        Subjective:   Not too bad today. It's hurting, but not like it was the other week. N/T in L leg  19 Ain't doing too bad today. Doing good with home exercises. Feels it down the posterior L leg to knee    Objective:  Observation:   Test measurements:    19  Posture: flexed posture. TTP: B lumbar paraspinals. Observation: Gait: decreased R weight shift, decreased L trunk rotation. AROM lumbar: flexion decreased 25%, low back pain central, L and R; extension to neutral, no pain increase; SB L WFL, no pain; SB R absent, \"a little bit of tension. \"  Edema: (-) B Babinski and clonus; DTR B quads 2/3, Achilles 0/3; (-) dural tension test B. Scar: Sensation: intact B LE dermatones L2-L5. Strength RLE: WNL  Strength LLE: WNL      Exercises:  Exercise/Equipment Resistance/Repetitions Other comments        Nustep #10 5 min                                                                   Other Therapeutic Activities:      Home Exercise Program:  5/6/19 [facility], PPT, bridge, TA with march. Issued written instructions with pictures.  Patient demonstrated exercises in the clinic    Manual Treatments: STM B lumbar paraspinals 14'     Modalities:  IFC with HP 15' lumbar in hook lying    Timed Code Treatment Minutes:  25  Total Treatment Minutes:  45    Assessment  [] Patient tolerated treatment well [] Patient limited by fatigue  [x] Patient limited by pain  [] Patient limited by other medical complications  [] Other:         Prognosis: [x] Good [] Fair  [] Poor    Patient Requires Follow-up: [x] Yes  [] No    Plan:   [x] Continue per plan of care [] Alter current plan (see comments)  [] Plan of care initiated [] Hold pending MD visit [] Discharge  Plan for Next Session:  Progress HEP     Electronically signed by:  Michelle Galvez, PTA [id number]

## 2019-05-14 DIAGNOSIS — I10 ESSENTIAL HYPERTENSION: ICD-10-CM

## 2019-05-15 ENCOUNTER — HOSPITAL ENCOUNTER (OUTPATIENT)
Dept: PHYSICAL THERAPY | Age: 59
Setting detail: THERAPIES SERIES
Discharge: HOME | End: 2019-05-15
Payer: MEDICAID

## 2019-05-15 PROCEDURE — 97110 THERAPEUTIC EXERCISES: CPT

## 2019-05-15 PROCEDURE — 97140 MANUAL THERAPY 1/> REGIONS: CPT

## 2019-05-15 PROCEDURE — G0283 ELEC STIM OTHER THAN WOUND: HCPCS

## 2019-05-15 NOTE — FLOWSHEET NOTE
Physical Therapy Daily Treatment Note  Date:  5/15/2019    Patient Name:  Jessica Richardson    :  1960  MRN: [id number]  Restrictions/Precautions:    Pertinent Medical History:  Medical/Treatment Diagnosis Information:  · Diagnosis: Left sided sciatica (M54.32)  · Treatment Diagnosis: Decreased ADL status  Insurance/Certification information:  PT Insurance Information: [facility]  Physician Information:  Referring Practitioner: Dr. Hewitt Sandifer of care signed (Y/N):  Sent to in basket on 19  Visit# / total visits:    Pain level: 3-4/10     Progress Note: []  Yes  []  No  Next due by: Visit #10      History of Injury:Pt c/o back and R hip pain and sometimes the pain moves \"all the way down to both of my feet. \"  Pt stated he has pre-diabetes and his feet pain might be from diabetes, he did not know. Pt rated back pain 10/10 in the early morning when getting out of bed. Pt rated pain 3/10 during the day and 2/10 at night. But, \"all of a sudden it changed and the hip stopped bothering me probably last month into this month. \"  But, back pain continues at the central low back and \"into my butt cheek\", sometimes L and sometimes R.  Pt stated his back pain started in the . Pt had surgery for \"L4/L5, they did something to my discs, I don't know. \"        Subjective:   Not too bad today. It's hurting, but not like it was the other week. N/T in L leg  19 Ain't doing too bad today. Doing good with home exercises. Feels it down the posterior L leg to knee  5/15/19 Knee is bothering him today, but it always bothers him  Objective:  Observation:   Test measurements:    19  Posture: flexed posture. TTP: B lumbar paraspinals. Observation: Gait: decreased R weight shift, decreased L trunk rotation.   AROM lumbar: flexion decreased 25%, low back pain central, L and R; extension to neutral, no pain increase; SB L WFL, no pain; SB R absent, \"a little bit of

## 2019-05-17 RX ORDER — METOPROLOL SUCCINATE 25 MG/1
TABLET, EXTENDED RELEASE ORAL
Qty: 15 TABLET | Refills: 0 | OUTPATIENT
Start: 2019-05-17

## 2019-05-20 ENCOUNTER — HOSPITAL ENCOUNTER (OUTPATIENT)
Dept: PHYSICAL THERAPY | Age: 59
Setting detail: THERAPIES SERIES
Discharge: HOME | End: 2019-05-20
Payer: MEDICAID

## 2019-05-20 PROCEDURE — 97110 THERAPEUTIC EXERCISES: CPT

## 2019-05-20 PROCEDURE — G0283 ELEC STIM OTHER THAN WOUND: HCPCS

## 2019-05-20 PROCEDURE — 97140 MANUAL THERAPY 1/> REGIONS: CPT

## 2019-05-20 NOTE — FLOWSHEET NOTE
Physical Therapy Daily Treatment Note  Date:  2019    Patient Name:  Macrina Reyes    :  1960  MRN: [id number]  Restrictions/Precautions:    Pertinent Medical History:  Medical/Treatment Diagnosis Information:  · Diagnosis: Left sided sciatica (M54.32)  · Treatment Diagnosis: Decreased ADL status  Insurance/Certification information:  PT Insurance Information: [facility]  Physician Information:  Referring Practitioner: Dr. Cherelle Mccray of care signed (Y/N):  Sent to in basket on 19  Visit# / total visits:    Pain level: 3-4/10     Progress Note: []  Yes  []  No  Next due by: Visit #10      History of Injury:Pt c/o back and R hip pain and sometimes the pain moves \"all the way down to both of my feet. \"  Pt stated he has pre-diabetes and his feet pain might be from diabetes, he did not know. Pt rated back pain 10/10 in the early morning when getting out of bed. Pt rated pain 3/10 during the day and 2/10 at night. But, \"all of a sudden it changed and the hip stopped bothering me probably last month into this month. \"  But, back pain continues at the central low back and \"into my butt cheek\", sometimes L and sometimes R.  Pt stated his back pain started in the . Pt had surgery for \"L4/L5, they did something to my discs, I don't know. \"        Subjective:   Not too bad today. It's hurting, but not like it was the other week. N/T in L leg  19 Ain't doing too bad today. Doing good with home exercises. Feels it down the posterior L leg to knee  5/15/19 Knee is bothering him today, but it always bothers him  19 \"I do (HEP) pretty much every day, towards the evening. \"    Objective:  Observation:   Test measurements:    19  Posture: flexed posture. TTP: B lumbar paraspinals. Observation: Gait: decreased R weight shift, decreased L trunk rotation.   AROM lumbar: flexion decreased 25%, low back pain central, L and R; extension to neutral, no pain increase; SB L WFL, no pain; SB R absent, \"a little bit of tension. \"  Edema: (-) B Babinski and clonus; DTR B quads 2/3, Achilles 0/3; (-) dural tension test B. Scar: Sensation: intact B LE dermatones L2-L5. Strength RLE: WNL  Strength LLE: WNL      Exercises:  Exercise/Equipment Resistance/Repetitions Other comments        Nustep #10 5 min    Piriformis str 1x30\"   manual                                                              Other Therapeutic Activities:      Home Exercise Program:    5/6/19 [facility], PPT, bridge, TA with march. Issued written instructions with pictures.  Patient demonstrated exercises in the clinic  5/20/19 piriformis stretch, fall outs    Manual Treatments: STM B lumbar paraspinals 14'     Modalities:  IFC with HP 15' lumbar in hook lying    Timed Code Treatment Minutes:  25  Total Treatment Minutes:  45    Assessment  [] Patient tolerated treatment well [] Patient limited by fatigue  [x] Patient limited by pain  [] Patient limited by other medical complications  [] Other:         Prognosis: [x] Good [] Fair  [] Poor    Patient Requires Follow-up: [x] Yes  [] No    Plan:   [x] Continue per plan of care [] Alter current plan (see comments)  [] Plan of care initiated [] Hold pending MD visit [] Discharge  Plan for Next Session:  Progress HEP     Electronically signed by:  Federico Jackson, [address] [place] [address]

## 2019-05-22 ENCOUNTER — HOSPITAL ENCOUNTER (OUTPATIENT)
Dept: PHYSICAL THERAPY | Age: 59
Setting detail: THERAPIES SERIES
Discharge: HOME | End: 2019-05-22
Payer: MEDICAID

## 2019-05-22 PROCEDURE — G0283 ELEC STIM OTHER THAN WOUND: HCPCS

## 2019-05-22 PROCEDURE — 97140 MANUAL THERAPY 1/> REGIONS: CPT

## 2019-05-22 PROCEDURE — 97110 THERAPEUTIC EXERCISES: CPT

## 2019-05-22 NOTE — FLOWSHEET NOTE
Physical Therapy Daily Treatment Note  Date:  2019    Patient Name:  Arian De La Cruz    :  1960  MRN: [id number]  Restrictions/Precautions:    Pertinent Medical History:  Medical/Treatment Diagnosis Information:  · Diagnosis: Left sided sciatica (M54.32)  · Treatment Diagnosis: Decreased ADL status  Insurance/Certification information:  PT Insurance Information: [facility]  Physician Information:  Referring Practitioner: Dr. Deborah Blair of care signed (Y/N):  Sent to in basket on 19  Visit# / total visits:    Pain level: 3-4/10     Progress Note: []  Yes  []  No  Next due by: Visit #10      History of Injury:Pt c/o back and R hip pain and sometimes the pain moves \"all the way down to both of my feet. \"  Pt stated he has pre-diabetes and his feet pain might be from diabetes, he did not know. Pt rated back pain 10/10 in the early morning when getting out of bed. Pt rated pain 3/10 during the day and 2/10 at night. But, \"all of a sudden it changed and the hip stopped bothering me probably last month into this month. \"  But, back pain continues at the central low back and \"into my butt cheek\", sometimes L and sometimes R.  Pt stated his back pain started in the . Pt had surgery for \"L4/L5, they did something to my discs, I don't know. \"        Subjective:   Not too bad today. It's hurting, but not like it was the other week. N/T in L leg  19 Ain't doing too bad today. Doing good with home exercises. Feels it down the posterior L leg to knee  5/15/19 Knee is bothering him today, but it always bothers him  19 \"I do (HEP) pretty much every day, towards the evening. \"  19 Pt stated he feels looser and his nerve \"has not acted up\" with pain across the back. Back pain today is rated 3/10. Pain over the past few days has been no higher than 3/10, but pt is never painless. Pt's primary c/o is L buttock pain.     Objective:  Observation:   Test measurements:    4/29/19  Posture: flexed posture. TTP: B lumbar paraspinals. Observation: Gait: decreased R weight shift, decreased L trunk rotation. AROM lumbar: flexion decreased 25%, low back pain central, L and R; extension to neutral, no pain increase; SB L WFL, no pain; SB R absent, \"a little bit of tension. \"  Edema: (-) B Babinski and clonus; DTR B quads 2/3, Achilles 0/3; (-) dural tension test B. Sensation: intact B LE dermatones L2-L5. Strength RLE: WNL  Strength LLE: WNL   5/22/19  TTP: B lumbar paraspinals. AROM lumbar: flexion decreased 25%, no pain; extension to neutral, no pain increase; SB L WFL, no pain; SB R absent, L low back pain        Exercises:  Exercise/Equipment Resistance/Repetitions Other comments        Nustep #10 6 min    Piriformis str 1x30\"   manual         Gastroc incline stretch  Hamstring 2x30\"  2x30\"                                                    Other Therapeutic Activities:      Home Exercise Program:    5/6/19 [facility], PPT, bridge, TA with march. Issued written instructions with pictures.  Patient demonstrated exercises in the clinic  5/20/19 piriformis stretch, fall outs    Manual Treatments: STM B lumbar paraspinals 14'     Modalities:  IFC with HP 15' lumbar in hook lying    Timed Code Treatment Minutes:  30  Total Treatment Minutes:  45    Assessment  [] Patient tolerated treatment well [] Patient limited by fatigue  [x] Patient limited by pain  [] Patient limited by other medical complications  [] Other:         Prognosis: [x] Good [] Fair  [] Poor    Patient Requires Follow-up: [x] Yes  [] No    Plan:   [x] Continue per plan of care [] Alter current plan (see comments)  [] Plan of care initiated [] Hold pending MD visit [] Discharge  Plan for Next Session:  Progress HEP     Electronically signed by:  Chriss Bateman PT

## 2019-05-23 DIAGNOSIS — F51.01 PRIMARY INSOMNIA: ICD-10-CM

## 2019-05-23 RX ORDER — ZOLPIDEM TARTRATE 10 MG/1
10 TABLET ORAL NIGHTLY PRN
Qty: 30 TABLET | Refills: 2 | Status: SHIPPED | OUTPATIENT
Start: 2019-05-23 | End: 2019-08-20 | Stop reason: SDUPTHER

## 2019-05-29 ENCOUNTER — HOSPITAL ENCOUNTER (OUTPATIENT)
Dept: PHYSICAL THERAPY | Age: 59
Setting detail: THERAPIES SERIES
Discharge: HOME | End: 2019-05-29
Payer: MEDICAID

## 2019-05-29 PROCEDURE — 97110 THERAPEUTIC EXERCISES: CPT

## 2019-05-29 PROCEDURE — 97140 MANUAL THERAPY 1/> REGIONS: CPT

## 2019-05-29 PROCEDURE — G0283 ELEC STIM OTHER THAN WOUND: HCPCS

## 2019-06-07 RX ORDER — ASPIRIN 81 MG/1
TABLET, CHEWABLE ORAL
Qty: 30 TABLET | Refills: 1 | OUTPATIENT
Start: 2019-06-07

## 2019-06-10 ENCOUNTER — OFFICE VISIT (OUTPATIENT)
Dept: INTERNAL MEDICINE CLINIC | Age: 59
End: 2019-06-10
Payer: MEDICAID

## 2019-06-10 VITALS
TEMPERATURE: 97.9 F | HEIGHT: 70 IN | BODY MASS INDEX: 28.58 KG/M2 | WEIGHT: 199.6 LBS | OXYGEN SATURATION: 100 % | HEART RATE: 72 BPM | SYSTOLIC BLOOD PRESSURE: 122 MMHG | DIASTOLIC BLOOD PRESSURE: 74 MMHG

## 2019-06-10 DIAGNOSIS — L08.9 INFECTED SEBACEOUS CYST: Primary | ICD-10-CM

## 2019-06-10 DIAGNOSIS — L72.3 INFECTED SEBACEOUS CYST: Primary | ICD-10-CM

## 2019-06-10 DIAGNOSIS — R10.12 LUQ PAIN: ICD-10-CM

## 2019-06-10 DIAGNOSIS — D22.9 ATYPICAL NEVUS: ICD-10-CM

## 2019-06-10 PROCEDURE — G8427 DOCREV CUR MEDS BY ELIG CLIN: HCPCS | Performed by: INTERNAL MEDICINE

## 2019-06-10 PROCEDURE — 1036F TOBACCO NON-USER: CPT | Performed by: INTERNAL MEDICINE

## 2019-06-10 PROCEDURE — 99213 OFFICE O/P EST LOW 20 MIN: CPT | Performed by: INTERNAL MEDICINE

## 2019-06-10 PROCEDURE — G8598 ASA/ANTIPLAT THER USED: HCPCS | Performed by: INTERNAL MEDICINE

## 2019-06-10 PROCEDURE — G8417 CALC BMI ABV UP PARAM F/U: HCPCS | Performed by: INTERNAL MEDICINE

## 2019-06-10 PROCEDURE — 3017F COLORECTAL CA SCREEN DOC REV: CPT | Performed by: INTERNAL MEDICINE

## 2019-06-10 RX ORDER — SULFAMETHOXAZOLE AND TRIMETHOPRIM 800; 160 MG/1; MG/1
1 TABLET ORAL 2 TIMES DAILY
Qty: 14 TABLET | Refills: 0 | Status: SHIPPED | OUTPATIENT
Start: 2019-06-10 | End: 2019-06-17

## 2019-06-10 ASSESSMENT — ENCOUNTER SYMPTOMS
SORE THROAT: 0
COUGH: 0
CHEST TIGHTNESS: 0
VOMITING: 0
NAUSEA: 0
DIARRHEA: 0
RHINORRHEA: 0
BACK PAIN: 0
SINUS PAIN: 0
CONSTIPATION: 0
SHORTNESS OF BREATH: 0
ABDOMINAL PAIN: 1

## 2019-06-10 ASSESSMENT — PATIENT HEALTH QUESTIONNAIRE - PHQ9
SUM OF ALL RESPONSES TO PHQ QUESTIONS 1-9: 0
SUM OF ALL RESPONSES TO PHQ9 QUESTIONS 1 & 2: 0
SUM OF ALL RESPONSES TO PHQ QUESTIONS 1-9: 0
2. FEELING DOWN, DEPRESSED OR HOPELESS: 0
1. LITTLE INTEREST OR PLEASURE IN DOING THINGS: 0

## 2019-06-10 NOTE — PROGRESS NOTES
SUBJECTIVE:  Sherryle Bucker is a 64 y.o. male being evaluated for:    Chief Complaint   Patient presents with    Abdominal Pain     left side starts below nipple and runs down the side. HPI   65 y/o male with Hx of \"burst bowel\" and describing pain pinpoint central to left chest pain running under L pectoral andLUQ ABD swelling. Pt denies fever, chills, body aches, current reflux, nausea, vomiting, constipation, diarrhea. No Known Allergies  Current Outpatient Medications   Medication Sig Dispense Refill    zolpidem (AMBIEN) 10 MG tablet Take 1 tablet by mouth nightly as needed for Sleep for up to 90 days. 30 tablet 2    omeprazole (PRILOSEC) 20 MG delayed release capsule TAKE ONE CAPSULE BY MOUTH DAILY 30 capsule 3    cetirizine (ZYRTEC) 10 MG tablet TAKE ONE TABLET BY MOUTH DAILY 30 tablet 5    tiZANidine (ZANAFLEX) 4 MG tablet TAKE ONE TABLET BY MOUTH THREE TIMES A DAY 90 tablet 2    clopidogrel (PLAVIX) 75 MG tablet TAKE ONE TABLET BY MOUTH DAILY 30 tablet 5    lisinopril (PRINIVIL;ZESTRIL) 20 MG tablet TAKE ONE TABLET BY MOUTH DAILY 30 tablet 5    aspirin 81 MG chewable tablet CHEW ONE TABLET BY MOUTH DAILY 30 tablet 2    ibuprofen (ADVIL;MOTRIN) 800 MG tablet Take 1 tablet by mouth 3 times daily (with meals) 270 tablet 1    atorvastatin (LIPITOR) 40 MG tablet Take 1 tablet by mouth daily 90 tablet 2    cyanocobalamin ([employer] VITAMIN B12) 1000 MCG tablet Take 1 tablet by mouth daily 30 tablet 2    Emollient (AQUAPHILIC) OINT Apply daily to dry skin. 454 g 5    metoprolol succinate (TOPROL XL) 25 MG extended release tablet TAKE ONE-HALF TABLET BY MOUTH DAILY 15 tablet 0    ramelteon (ROZEREM) 8 MG tablet Take 1 tablet by mouth nightly 30 tablet 3     No current facility-administered medications for this visit.           Social History     Socioeconomic History    Marital status:      Spouse name: Not on file    Number of children: Not on file    Years of education: 10    Highest visit:    Infected sebaceous cyst  -     sulfamethoxazole-trimethoprim (BACTRIM DS;SEPTRA DS) 800-160 MG per tablet; Take 1 tablet by mouth 2 times daily for 7 days    LUQ pain question of pain is from the cyst.  If persisting will check a ct scan    Atypical nevus  -     [facility]        Orders Placed This Encounter   Medications    sulfamethoxazole-trimethoprim (BACTRIM DS;SEPTRA DS) 800-160 MG per tablet     Sig: Take 1 tablet by mouth 2 times daily for 7 days     Dispense:  14 tablet     Refill:  0        Return in about 3 months (around 9/10/2019). Patient case seen and discussed under supervision of preceptor, Dr. Soha Kendrick MD.   Note made by PA-S2 student in the status of a scribe, with review by preceptor. Deya Castro MD, PA-S2     Addendum to PA student note:  Pt seen, examined, and evaluated with PA student , who acted as my scribe for the above documentation. I have reviewed the current history, physical findings, labs, and assessment/plan. I agree with note as documented. Todd Negron MD  Internal Medicine Physician     The note was completed using EMR. Every effort was made to ensure accuracy; However, inadvertent computerized transcription errors may be present.

## 2019-06-14 DIAGNOSIS — I10 ESSENTIAL HYPERTENSION: ICD-10-CM

## 2019-06-17 RX ORDER — METOPROLOL SUCCINATE 25 MG/1
TABLET, EXTENDED RELEASE ORAL
Qty: 15 TABLET | Refills: 0 | Status: SHIPPED | OUTPATIENT
Start: 2019-06-17 | End: 2019-07-19 | Stop reason: SDUPTHER

## 2019-07-16 RX ORDER — ASPIRIN 81 MG/1
TABLET, CHEWABLE ORAL
Qty: 30 TABLET | Refills: 1 | Status: SHIPPED | OUTPATIENT
Start: 2019-07-16 | End: 2019-09-16 | Stop reason: SDUPTHER

## 2019-07-19 DIAGNOSIS — I10 ESSENTIAL HYPERTENSION: ICD-10-CM

## 2019-07-19 RX ORDER — METOPROLOL SUCCINATE 25 MG/1
TABLET, EXTENDED RELEASE ORAL
Qty: 15 TABLET | Refills: 0 | OUTPATIENT
Start: 2019-07-19

## 2019-07-19 RX ORDER — METOPROLOL SUCCINATE 25 MG/1
25 TABLET, EXTENDED RELEASE ORAL DAILY
Qty: 30 TABLET | Refills: 1 | Status: SHIPPED | OUTPATIENT
Start: 2019-07-19 | End: 2019-09-14 | Stop reason: SDUPTHER

## 2019-08-20 DIAGNOSIS — F51.01 PRIMARY INSOMNIA: ICD-10-CM

## 2019-08-22 RX ORDER — ZOLPIDEM TARTRATE 10 MG/1
10 TABLET ORAL NIGHTLY PRN
Qty: 30 TABLET | Refills: 1 | Status: SHIPPED | OUTPATIENT
Start: 2019-08-22 | End: 2019-10-22 | Stop reason: SDUPTHER

## 2019-09-03 RX ORDER — OMEPRAZOLE 20 MG/1
CAPSULE, DELAYED RELEASE ORAL
Qty: 30 CAPSULE | Refills: 5 | Status: SHIPPED | OUTPATIENT
Start: 2019-09-03 | End: 2020-03-02

## 2019-09-10 ENCOUNTER — OFFICE VISIT (OUTPATIENT)
Dept: INTERNAL MEDICINE CLINIC | Age: 59
End: 2019-09-10
Payer: MEDICAID

## 2019-09-10 VITALS
BODY MASS INDEX: 28.03 KG/M2 | SYSTOLIC BLOOD PRESSURE: 114 MMHG | OXYGEN SATURATION: 98 % | HEIGHT: 70 IN | TEMPERATURE: 97.2 F | HEART RATE: 72 BPM | DIASTOLIC BLOOD PRESSURE: 74 MMHG | WEIGHT: 195.8 LBS

## 2019-09-10 DIAGNOSIS — F51.01 PRIMARY INSOMNIA: Primary | ICD-10-CM

## 2019-09-10 DIAGNOSIS — I10 ESSENTIAL HYPERTENSION: ICD-10-CM

## 2019-09-10 DIAGNOSIS — E78.00 HYPERCHOLESTEREMIA: ICD-10-CM

## 2019-09-10 DIAGNOSIS — R73.03 PRE-DIABETES: ICD-10-CM

## 2019-09-10 PROCEDURE — G8417 CALC BMI ABV UP PARAM F/U: HCPCS | Performed by: INTERNAL MEDICINE

## 2019-09-10 PROCEDURE — G8598 ASA/ANTIPLAT THER USED: HCPCS | Performed by: INTERNAL MEDICINE

## 2019-09-10 PROCEDURE — 3017F COLORECTAL CA SCREEN DOC REV: CPT | Performed by: INTERNAL MEDICINE

## 2019-09-10 PROCEDURE — G8427 DOCREV CUR MEDS BY ELIG CLIN: HCPCS | Performed by: INTERNAL MEDICINE

## 2019-09-10 PROCEDURE — 1036F TOBACCO NON-USER: CPT | Performed by: INTERNAL MEDICINE

## 2019-09-10 PROCEDURE — 99213 OFFICE O/P EST LOW 20 MIN: CPT | Performed by: INTERNAL MEDICINE

## 2019-09-10 ASSESSMENT — ENCOUNTER SYMPTOMS
COUGH: 0
DIARRHEA: 0
ABDOMINAL PAIN: 0
NAUSEA: 0
CONSTIPATION: 0
SHORTNESS OF BREATH: 0
VOMITING: 0

## 2019-09-10 NOTE — PROGRESS NOTES
SUBJECTIVE:  Josephine Mix is a 64 y.o. male being evaluated for:    Chief Complaint   Patient presents with    Check-Up     3 MO F/U CONTROLLED MED REFILLS       HPI   Ambien working for sleep  NO am tiredness  NO nightt time sleep activiies   Heart burn is good  Without the medication entire chest burning   Pre diabetes and has stopped pop and ice cream   Has lost about 10 #   No Known Allergies  Current Outpatient Medications   Medication Sig Dispense Refill    omeprazole (PRILOSEC) 20 MG delayed release capsule TAKE ONE CAPSULE BY MOUTH DAILY 30 capsule 5    zolpidem (AMBIEN) 10 MG tablet Take 1 tablet by mouth nightly as needed for Sleep for up to 60 days. 30 tablet 1    metoprolol succinate (TOPROL XL) 25 MG extended release tablet Take 1 tablet by mouth daily 30 tablet 1    aspirin (ASPIRIN LOW DOSE) 81 MG chewable tablet CHEW ONE TABLET BY MOUTH DAILY 30 tablet 1    cetirizine (ZYRTEC) 10 MG tablet TAKE ONE TABLET BY MOUTH DAILY 30 tablet 5    tiZANidine (ZANAFLEX) 4 MG tablet TAKE ONE TABLET BY MOUTH THREE TIMES A DAY 90 tablet 2    clopidogrel (PLAVIX) 75 MG tablet TAKE ONE TABLET BY MOUTH DAILY 30 tablet 5    lisinopril (PRINIVIL;ZESTRIL) 20 MG tablet TAKE ONE TABLET BY MOUTH DAILY 30 tablet 5    ibuprofen (ADVIL;MOTRIN) 800 MG tablet Take 1 tablet by mouth 3 times daily (with meals) 270 tablet 1    atorvastatin (LIPITOR) 40 MG tablet Take 1 tablet by mouth daily 90 tablet 2    cyanocobalamin ([employer]S VITAMIN B12) 1000 MCG tablet Take 1 tablet by mouth daily 30 tablet 2    Emollient (AQUAPHILIC) OINT Apply daily to dry skin. 454 g 5     No current facility-administered medications for this visit. Social History     Socioeconomic History    Marital status:      Spouse name: Not on file    Number of children: Not on file    Years of education: 8    Highest education level: Not on file   Occupational History    Occupation: unemployed     Comment: disability from back.  last Wt 195 lb 12.8 oz (88.8 kg)   SpO2 98%   BMI 28.09 kg/m²      Body mass index is 28.09 kg/m². Physical Exam   Constitutional: He is oriented to person, place, and time. He appears well-developed and well-nourished. No distress. HENT:   Head: Normocephalic and atraumatic. Mouth/Throat: No oropharyngeal exudate. Eyes: Pupils are equal, round, and reactive to light. Right eye exhibits no discharge. Left eye exhibits no discharge. Neck: No tracheal deviation present. Cardiovascular: Normal rate, regular rhythm and normal heart sounds. Exam reveals no gallop and no friction rub. No murmur heard. Pulmonary/Chest: Effort normal and breath sounds normal. He exhibits no tenderness. Abdominal: Soft. He exhibits no distension. There is no tenderness. No HSM   Musculoskeletal: He exhibits no edema. Lymphadenopathy:     He has no cervical adenopathy. Neurological: He is alert and oriented to person, place, and time. Coordination normal.   Skin: Skin is warm and dry. Capillary refill takes less than 2 seconds. .   Psychiatric: He has a normal mood and affect. His behavior is normal. Judgment and thought content normal.       ASSESSMENT/PLAN:    Brennen Bridges was seen today for check-up. Diagnoses and all orders for this visit:    Primary insomnia reviewed oarrs     Essential hypertension controlled   -     Comprehensive Metabolic Panel; Future    Hypercholesteremia  -     Lipid Panel; Future    Pre-diabetes losing weight and cutting down on carbohydrates   -     Hemoglobin A1C; Future    refuses vaccines   No orders of the defined types were placed in this encounter. Return in about 6 months (around 3/10/2020), or if symptoms worsen or fail to improve. There are no Patient Instructions on file for this visit.

## 2019-09-11 RX ORDER — ASPIRIN 81 MG/1
TABLET, CHEWABLE ORAL
Qty: 30 TABLET | Refills: 0 | OUTPATIENT
Start: 2019-09-11

## 2019-09-14 DIAGNOSIS — I10 ESSENTIAL HYPERTENSION: ICD-10-CM

## 2019-09-16 RX ORDER — METOPROLOL SUCCINATE 25 MG/1
TABLET, EXTENDED RELEASE ORAL
Qty: 90 TABLET | Refills: 1 | Status: SHIPPED | OUTPATIENT
Start: 2019-09-16 | End: 2020-03-11

## 2019-09-16 RX ORDER — ASPIRIN 81 MG/1
TABLET, CHEWABLE ORAL
Qty: 90 TABLET | Refills: 1 | Status: SHIPPED | OUTPATIENT
Start: 2019-09-16 | End: 2020-03-11

## 2019-09-17 RX ORDER — ATORVASTATIN CALCIUM 40 MG/1
TABLET, FILM COATED ORAL
Qty: 30 TABLET | Refills: 1 | OUTPATIENT
Start: 2019-09-17

## 2019-09-18 RX ORDER — ATORVASTATIN CALCIUM 40 MG/1
40 TABLET, FILM COATED ORAL DAILY
Qty: 90 TABLET | Refills: 2 | Status: SHIPPED
Start: 2019-09-18 | End: 2020-03-10 | Stop reason: ALTCHOICE

## 2019-10-22 DIAGNOSIS — F51.01 PRIMARY INSOMNIA: ICD-10-CM

## 2019-10-22 RX ORDER — ZOLPIDEM TARTRATE 10 MG/1
10 TABLET ORAL NIGHTLY PRN
Qty: 30 TABLET | Refills: 2 | Status: SHIPPED | OUTPATIENT
Start: 2019-10-22 | End: 2020-01-20

## 2019-11-21 DIAGNOSIS — H69.81 EUSTACHIAN TUBE DYSFUNCTION, RIGHT: ICD-10-CM

## 2019-11-21 RX ORDER — TIZANIDINE 4 MG/1
TABLET ORAL
Qty: 90 TABLET | Refills: 1 | Status: SHIPPED | OUTPATIENT
Start: 2019-11-21 | End: 2020-03-10 | Stop reason: SDUPTHER

## 2019-11-21 RX ORDER — CETIRIZINE HYDROCHLORIDE 10 MG/1
TABLET ORAL
Qty: 90 TABLET | Refills: 1 | Status: SHIPPED | OUTPATIENT
Start: 2019-11-21 | End: 2020-06-08

## 2019-12-19 ENCOUNTER — TELEPHONE (OUTPATIENT)
Dept: INTERNAL MEDICINE CLINIC | Age: 59
End: 2019-12-19

## 2019-12-19 RX ORDER — CLOPIDOGREL BISULFATE 75 MG/1
TABLET ORAL
Qty: 30 TABLET | Refills: 5 | Status: SHIPPED | OUTPATIENT
Start: 2019-12-19

## 2020-01-13 ENCOUNTER — OFFICE VISIT (OUTPATIENT)
Dept: DERMATOLOGY | Age: 60
End: 2020-01-13
Payer: MEDICAID

## 2020-01-13 PROCEDURE — G8484 FLU IMMUNIZE NO ADMIN: HCPCS | Performed by: DERMATOLOGY

## 2020-01-13 PROCEDURE — 1036F TOBACCO NON-USER: CPT | Performed by: DERMATOLOGY

## 2020-01-13 PROCEDURE — G8417 CALC BMI ABV UP PARAM F/U: HCPCS | Performed by: DERMATOLOGY

## 2020-01-13 PROCEDURE — 3017F COLORECTAL CA SCREEN DOC REV: CPT | Performed by: DERMATOLOGY

## 2020-01-13 PROCEDURE — G8427 DOCREV CUR MEDS BY ELIG CLIN: HCPCS | Performed by: DERMATOLOGY

## 2020-01-13 PROCEDURE — 99213 OFFICE O/P EST LOW 20 MIN: CPT | Performed by: DERMATOLOGY

## 2020-01-13 RX ORDER — TRIAMCINOLONE ACETONIDE 1 MG/G
CREAM TOPICAL
Qty: 45 G | Refills: 1 | Status: SHIPPED | OUTPATIENT
Start: 2020-01-13 | End: 2021-01-04 | Stop reason: SDUPTHER

## 2020-01-13 NOTE — Clinical Note
Dear Vish Mcduffie had the pleasure of seeing your patient, Cherelle Muller, in my office today. Thanks so much for involving me in his care. Please see my note and let me if you have any questions or concerns. Best Regards,Darwin

## 2020-01-13 NOTE — PROGRESS NOTES
[facility] Dermatology  [facility] Sukhwinder Vo  1960    64 y.o. male     Date of Visit: 1/13/2020    I was asked to see this patient by Worth Severin, MD.    Chief Complaint: skin lesions    History of Present Illness:    1. He complains of a lesion on the scalp - present for years. Removed in the past but recurred. 2.  He also complains of a dark lesion on the right shoulder. Present for few years. 3.  He complains of a pruritic area on the left thigh. On Plavix and ASA. Review of Systems:  Skin: No new or changing moles. Past Medical History, Family History, Surgical History, Medications and Allergies reviewed.     Past Medical History:   Diagnosis Date    Alcohol abuse, in remission     2016 quit     Alcohol abuse, in remission     Arthritis     rt hip, hands    Asthma     Back pain     Cerebral artery occlusion with cerebral infarction ([address]     COPD (chronic obstructive pulmonary disease) (HCC)     beatter since quit smoking in 2017    CVA (cerebral infarction) 4/28/13    affected L side     Depression     GERD (gastroesophageal reflux disease)     Hyperlipidemia     Hypertension     Insomnia     MRSA carrier     very long time ago, more than one year    Paresthesia of left leg     chronic problem    Paresthesia of right leg     Psoriasis (a type of skin inflammation)     Sciatic nerve pain, left     Sciatic nerve pain, right     Smoker     1.5 ppd     Past Surgical History:   Procedure Laterality Date    ABDOMEN SURGERY      APPENDECTOMY      ruptured     BACK SURGERY      maybe a fusion, L4 L5 herniated disc    CHOLECYSTECTOMY, LAPAROSCOPIC  2/13/2015    LAPAROSCOPIC CHOLECYSTECTOMY WITH CHOLANGIOGRAM, LYSIS OF    COLONOSCOPY  10/01/2018    [place] COLONOSCOPY FLX DX W/COLLJ SPEC WHEN PFRMD N/A 10/1/2018    COLONOSCOPY performed by Michael Lee MD at [place] [address]  4/24/13    for ischemic lichenified scaly erythematous plaque. Assessment and Plan     1. Pilar cyst of the scalp - asymptomatic    Consider excision if enlarges or becomes symptomatic. 2. Hemangioma of skin of the right shoulder     Reassurance. 3. Chronic lichenified dermatitis of the left thigh    Triamcinolone 0.1% cream twice daily for up to 2 weeks or until improved.

## 2020-02-04 ENCOUNTER — TELEPHONE (OUTPATIENT)
Dept: INTERNAL MEDICINE CLINIC | Age: 60
End: 2020-02-04

## 2020-02-04 NOTE — TELEPHONE ENCOUNTER
It looks like previously they thought this was a infected cyst.  We could treat him for an antibiotic. Question if this worked last time.   If continued see and see if should send to surgeon  Let me know what he wants to do

## 2020-02-04 NOTE — TELEPHONE ENCOUNTER
Pt has a sore tender area on his upper abdomen that sometimes toscano. Pt has seen dr Omer Coleman for this. Pt wants to know what to do?

## 2020-02-05 ENCOUNTER — OFFICE VISIT (OUTPATIENT)
Dept: INTERNAL MEDICINE CLINIC | Age: 60
End: 2020-02-05
Payer: MEDICAID

## 2020-02-05 VITALS
BODY MASS INDEX: 29.41 KG/M2 | OXYGEN SATURATION: 98 % | HEART RATE: 76 BPM | TEMPERATURE: 97.9 F | SYSTOLIC BLOOD PRESSURE: 122 MMHG | RESPIRATION RATE: 16 BRPM | DIASTOLIC BLOOD PRESSURE: 68 MMHG | WEIGHT: 205 LBS

## 2020-02-05 LAB
A/G RATIO: 1.9 (ref 1.1–2.2)
ALBUMIN SERPL-MCNC: 4.5 G/DL (ref 3.4–5)
ALP BLD-CCNC: 68 U/L (ref 40–129)
ALT SERPL-CCNC: 27 U/L (ref 10–40)
ANION GAP SERPL CALCULATED.3IONS-SCNC: 12 MMOL/L (ref 3–16)
AST SERPL-CCNC: 23 U/L (ref 15–37)
BILIRUB SERPL-MCNC: 0.3 MG/DL (ref 0–1)
BUN BLDV-MCNC: 9 MG/DL (ref 7–20)
CALCIUM SERPL-MCNC: 9.9 MG/DL (ref 8.3–10.6)
CHLORIDE BLD-SCNC: 103 MMOL/L (ref 99–110)
CHOLESTEROL, TOTAL: 136 MG/DL (ref 0–199)
CO2: 25 MMOL/L (ref 21–32)
CREAT SERPL-MCNC: 0.9 MG/DL (ref 0.9–1.3)
GFR AFRICAN AMERICAN: >60
GFR NON-AFRICAN AMERICAN: >60
GLOBULIN: 2.4 G/DL
GLUCOSE BLD-MCNC: 86 MG/DL (ref 70–99)
HCT VFR BLD CALC: 39.1 % (ref 40.5–52.5)
HDLC SERPL-MCNC: 42 MG/DL (ref 40–60)
HEMOGLOBIN: 12.8 G/DL (ref 13.5–17.5)
LDL CHOLESTEROL CALCULATED: ABNORMAL MG/DL
LDL CHOLESTEROL DIRECT: 38 MG/DL
MCH RBC QN AUTO: 30 PG (ref 26–34)
MCHC RBC AUTO-ENTMCNC: 32.8 G/DL (ref 31–36)
MCV RBC AUTO: 91.5 FL (ref 80–100)
PDW BLD-RTO: 13.7 % (ref 12.4–15.4)
PLATELET # BLD: 325 K/UL (ref 135–450)
PMV BLD AUTO: 9.3 FL (ref 5–10.5)
POTASSIUM SERPL-SCNC: 4.8 MMOL/L (ref 3.5–5.1)
RBC # BLD: 4.27 M/UL (ref 4.2–5.9)
SODIUM BLD-SCNC: 140 MMOL/L (ref 136–145)
TOTAL PROTEIN: 6.9 G/DL (ref 6.4–8.2)
TRIGL SERPL-MCNC: 388 MG/DL (ref 0–150)
VLDLC SERPL CALC-MCNC: ABNORMAL MG/DL
WBC # BLD: 7.7 K/UL (ref 4–11)

## 2020-02-05 PROCEDURE — G8484 FLU IMMUNIZE NO ADMIN: HCPCS | Performed by: INTERNAL MEDICINE

## 2020-02-05 PROCEDURE — G8417 CALC BMI ABV UP PARAM F/U: HCPCS | Performed by: INTERNAL MEDICINE

## 2020-02-05 PROCEDURE — 1036F TOBACCO NON-USER: CPT | Performed by: INTERNAL MEDICINE

## 2020-02-05 PROCEDURE — 99213 OFFICE O/P EST LOW 20 MIN: CPT | Performed by: INTERNAL MEDICINE

## 2020-02-05 PROCEDURE — 36415 COLL VENOUS BLD VENIPUNCTURE: CPT | Performed by: INTERNAL MEDICINE

## 2020-02-05 PROCEDURE — 3017F COLORECTAL CA SCREEN DOC REV: CPT | Performed by: INTERNAL MEDICINE

## 2020-02-05 PROCEDURE — G8427 DOCREV CUR MEDS BY ELIG CLIN: HCPCS | Performed by: INTERNAL MEDICINE

## 2020-02-05 ASSESSMENT — PATIENT HEALTH QUESTIONNAIRE - PHQ9
2. FEELING DOWN, DEPRESSED OR HOPELESS: 0
1. LITTLE INTEREST OR PLEASURE IN DOING THINGS: 0
SUM OF ALL RESPONSES TO PHQ QUESTIONS 1-9: 0
SUM OF ALL RESPONSES TO PHQ9 QUESTIONS 1 & 2: 0
SUM OF ALL RESPONSES TO PHQ QUESTIONS 1-9: 0
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
## 2020-02-06 ENCOUNTER — HOSPITAL ENCOUNTER (OUTPATIENT)
Dept: ULTRASOUND IMAGING | Age: 60
Discharge: HOME | End: 2020-02-06
Payer: MEDICAID

## 2020-02-06 LAB
ESTIMATED AVERAGE GLUCOSE: 114 MG/DL
HBA1C MFR BLD: 5.6 %

## 2020-02-06 PROCEDURE — 76999 ECHO EXAMINATION PROCEDURE: CPT

## 2020-02-07 ASSESSMENT — ENCOUNTER SYMPTOMS
CONSTIPATION: 0
VOMITING: 0
ABDOMINAL PAIN: 1
DIARRHEA: 0
NAUSEA: 0
SHORTNESS OF BREATH: 0
COUGH: 0
ABDOMINAL DISTENTION: 0

## 2020-02-29 ENCOUNTER — APPOINTMENT (OUTPATIENT)
Dept: CT IMAGING | Age: 60
End: 2020-02-29
Payer: MEDICAID

## 2020-02-29 ENCOUNTER — HOSPITAL ENCOUNTER (EMERGENCY)
Age: 60
Discharge: ANOTHER HOSPITAL | End: 2020-03-01
Attending: EMERGENCY MEDICINE
Payer: MEDICAID

## 2020-02-29 LAB
ANION GAP SERPL CALCULATED.3IONS-SCNC: 14 MMOL/L (ref 3–16)
BASOPHILS ABSOLUTE: 0.1 K/UL (ref 0–0.2)
BASOPHILS RELATIVE PERCENT: 1 %
BUN BLDV-MCNC: 9 MG/DL (ref 7–20)
CALCIUM SERPL-MCNC: 8.9 MG/DL (ref 8.3–10.6)
CHLORIDE BLD-SCNC: 104 MMOL/L (ref 99–110)
CO2: 25 MMOL/L (ref 21–32)
CREAT SERPL-MCNC: 1.1 MG/DL (ref 0.9–1.3)
EOSINOPHILS ABSOLUTE: 0.4 K/UL (ref 0–0.6)
EOSINOPHILS RELATIVE PERCENT: 4.7 %
GFR AFRICAN AMERICAN: >60
GFR NON-AFRICAN AMERICAN: >60
GLUCOSE BLD-MCNC: 129 MG/DL (ref 70–99)
HCT VFR BLD CALC: 35.5 % (ref 40.5–52.5)
HEMOGLOBIN: 11.8 G/DL (ref 13.5–17.5)
LACTIC ACID: 3.5 MMOL/L (ref 0.4–2)
LYMPHOCYTES ABSOLUTE: 3.3 K/UL (ref 1–5.1)
LYMPHOCYTES RELATIVE PERCENT: 41.3 %
MCH RBC QN AUTO: 29.8 PG (ref 26–34)
MCHC RBC AUTO-ENTMCNC: 33.3 G/DL (ref 31–36)
MCV RBC AUTO: 89.6 FL (ref 80–100)
MONOCYTES ABSOLUTE: 0.8 K/UL (ref 0–1.3)
MONOCYTES RELATIVE PERCENT: 9.6 %
NEUTROPHILS ABSOLUTE: 3.4 K/UL (ref 1.7–7.7)
NEUTROPHILS RELATIVE PERCENT: 43.4 %
PDW BLD-RTO: 13.5 % (ref 12.4–15.4)
PLATELET # BLD: 304 K/UL (ref 135–450)
PMV BLD AUTO: 8.2 FL (ref 5–10.5)
POTASSIUM SERPL-SCNC: 3.9 MMOL/L (ref 3.5–5.1)
RBC # BLD: 3.96 M/UL (ref 4.2–5.9)
SODIUM BLD-SCNC: 143 MMOL/L (ref 136–145)
TROPONIN: <0.01 NG/ML
WBC # BLD: 8 K/UL (ref 4–11)

## 2020-02-29 PROCEDURE — 99291 CRITICAL CARE FIRST HOUR: CPT

## 2020-02-29 PROCEDURE — 85025 COMPLETE CBC W/AUTO DIFF WBC: CPT

## 2020-02-29 PROCEDURE — 80048 BASIC METABOLIC PNL TOTAL CA: CPT

## 2020-02-29 PROCEDURE — 70450 CT HEAD/BRAIN W/O DYE: CPT

## 2020-02-29 PROCEDURE — 96365 THER/PROPH/DIAG IV INF INIT: CPT

## 2020-02-29 PROCEDURE — 93005 ELECTROCARDIOGRAM TRACING: CPT | Performed by: EMERGENCY MEDICINE

## 2020-02-29 PROCEDURE — 84484 ASSAY OF TROPONIN QUANT: CPT

## 2020-02-29 PROCEDURE — 36415 COLL VENOUS BLD VENIPUNCTURE: CPT

## 2020-02-29 PROCEDURE — 6360000002 HC RX W HCPCS: Performed by: EMERGENCY MEDICINE

## 2020-02-29 PROCEDURE — 96376 TX/PRO/DX INJ SAME DRUG ADON: CPT

## 2020-02-29 PROCEDURE — 72125 CT NECK SPINE W/O DYE: CPT

## 2020-02-29 PROCEDURE — 83605 ASSAY OF LACTIC ACID: CPT

## 2020-02-29 PROCEDURE — 2500000003 HC RX 250 WO HCPCS: Performed by: EMERGENCY MEDICINE

## 2020-02-29 PROCEDURE — 96375 TX/PRO/DX INJ NEW DRUG ADDON: CPT

## 2020-02-29 PROCEDURE — 2580000003 HC RX 258: Performed by: EMERGENCY MEDICINE

## 2020-02-29 RX ORDER — 0.9 % SODIUM CHLORIDE 0.9 %
1000 INTRAVENOUS SOLUTION INTRAVENOUS ONCE
Status: COMPLETED | OUTPATIENT
Start: 2020-02-29 | End: 2020-03-01

## 2020-02-29 RX ORDER — LEVETIRACETAM 10 MG/ML
1000 INJECTION INTRAVASCULAR ONCE
Status: COMPLETED | OUTPATIENT
Start: 2020-02-29 | End: 2020-03-01

## 2020-02-29 RX ORDER — ONDANSETRON 2 MG/ML
4 INJECTION INTRAMUSCULAR; INTRAVENOUS ONCE
Status: COMPLETED | OUTPATIENT
Start: 2020-02-29 | End: 2020-02-29

## 2020-02-29 RX ORDER — MORPHINE SULFATE 2 MG/ML
4 INJECTION, SOLUTION INTRAMUSCULAR; INTRAVENOUS ONCE
Status: COMPLETED | OUTPATIENT
Start: 2020-02-29 | End: 2020-02-29

## 2020-02-29 RX ORDER — LABETALOL HYDROCHLORIDE 5 MG/ML
10 INJECTION, SOLUTION INTRAVENOUS ONCE
Status: COMPLETED | OUTPATIENT
Start: 2020-02-29 | End: 2020-02-29

## 2020-02-29 RX ADMIN — SODIUM CHLORIDE 1000 ML: 9 INJECTION, SOLUTION INTRAVENOUS at 22:38

## 2020-02-29 RX ADMIN — LABETALOL HYDROCHLORIDE 10 MG: 5 INJECTION, SOLUTION INTRAVENOUS at 23:51

## 2020-02-29 RX ADMIN — MORPHINE SULFATE 4 MG: 2 INJECTION, SOLUTION INTRAMUSCULAR; INTRAVENOUS at 22:38

## 2020-02-29 RX ADMIN — ONDANSETRON 4 MG: 2 INJECTION INTRAMUSCULAR; INTRAVENOUS at 22:13

## 2020-02-29 RX ADMIN — ONDANSETRON 4 MG: 2 INJECTION INTRAMUSCULAR; INTRAVENOUS at 23:51

## 2020-02-29 RX ADMIN — HYDROMORPHONE HYDROCHLORIDE 0.5 MG: 1 INJECTION, SOLUTION INTRAMUSCULAR; INTRAVENOUS; SUBCUTANEOUS at 23:31

## 2020-02-29 RX ADMIN — LEVETIRACETAM 1000 MG: 10 INJECTION INTRAVENOUS at 23:52

## 2020-02-29 ASSESSMENT — PAIN DESCRIPTION - LOCATION: LOCATION: HEAD;NECK

## 2020-02-29 ASSESSMENT — PAIN DESCRIPTION - PAIN TYPE: TYPE: ACUTE PAIN

## 2020-02-29 ASSESSMENT — PAIN SCALES - GENERAL
PAINLEVEL_OUTOF10: 10
PAINLEVEL_OUTOF10: 8

## 2020-02-29 ASSESSMENT — PAIN DESCRIPTION - FREQUENCY: FREQUENCY: CONTINUOUS

## 2020-02-29 ASSESSMENT — PAIN DESCRIPTION - ORIENTATION: ORIENTATION: POSTERIOR

## 2020-03-01 VITALS
HEART RATE: 67 BPM | DIASTOLIC BLOOD PRESSURE: 81 MMHG | RESPIRATION RATE: 14 BRPM | WEIGHT: 207.89 LBS | BODY MASS INDEX: 30.79 KG/M2 | HEIGHT: 69 IN | OXYGEN SATURATION: 98 % | SYSTOLIC BLOOD PRESSURE: 138 MMHG | TEMPERATURE: 96.7 F

## 2020-03-01 LAB
EKG ATRIAL RATE: 63 BPM
EKG DIAGNOSIS: NORMAL
EKG P AXIS: 16 DEGREES
EKG P-R INTERVAL: 176 MS
EKG Q-T INTERVAL: 426 MS
EKG QRS DURATION: 92 MS
EKG QTC CALCULATION (BAZETT): 435 MS
EKG R AXIS: -7 DEGREES
EKG T AXIS: 12 DEGREES
EKG VENTRICULAR RATE: 63 BPM

## 2020-03-01 PROCEDURE — 6360000002 HC RX W HCPCS: Performed by: EMERGENCY MEDICINE

## 2020-03-01 PROCEDURE — 93010 ELECTROCARDIOGRAM REPORT: CPT | Performed by: INTERNAL MEDICINE

## 2020-03-01 PROCEDURE — 96375 TX/PRO/DX INJ NEW DRUG ADDON: CPT

## 2020-03-01 RX ORDER — PROMETHAZINE HYDROCHLORIDE 25 MG/ML
12.5 INJECTION, SOLUTION INTRAMUSCULAR; INTRAVENOUS ONCE
Status: COMPLETED | OUTPATIENT
Start: 2020-03-01 | End: 2020-03-01

## 2020-03-01 RX ADMIN — PROMETHAZINE HYDROCHLORIDE 12.5 MG: 25 INJECTION INTRAMUSCULAR; INTRAVENOUS at 00:51

## 2020-03-01 NOTE — ED TRIAGE NOTES
States laying in bed and felt a sharp stabbing pain to head and back of neck. Vomited X 4.  History of CVA in 2013

## 2020-03-01 NOTE — ED NOTES
Dr Devendra Dunlap spoke with Dr. Sahra Thornton.  Plan to transfer to endovascular at [address]  02/29/20 [id number]

## 2020-03-01 NOTE — ED NOTES
Bradycardia noted when actively vomiting. MD made aware.      Ryanmarianela Tinoco RN  02/29/20 [id number]

## 2020-03-01 NOTE — ED PROVIDER NOTES
L5 herniated disc    CHOLECYSTECTOMY, LAPAROSCOPIC  2015    LAPAROSCOPIC CHOLECYSTECTOMY WITH CHOLANGIOGRAM, LYSIS OF    COLONOSCOPY  10/01/2018    [place] COLONOSCOPY FLX DX W/COLLJ SPEC WHEN PFRMD N/A 10/1/2018    COLONOSCOPY performed by Toma Murphy MD at [address] [place] [address]  13    for ischemic bowel    TONSILLECTOMY       Family History   Problem Relation Age of Onset    Arthritis Mother     Heart Disease Mother         chf    Cancer Father         lung cancer     Heart Disease Father         CHF    No Known Problems Maternal Grandmother     No Known Problems Maternal Grandfather     No Known Problems Paternal Grandmother     No Known Problems Paternal Grandfather      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   Social Needs    Financial resource strain: Not on file    Food insecurity:     Worry: Not on file     Inability: Not on file    Transportation needs:     Medical: Not on file     Non-medical: Not on file   Tobacco Use    Smoking status: Former Smoker     Packs/day: 1.00     Years: 8.00     Pack years: 8.00     Types: Cigarettes     Last attempt to quit: 2013     Years since quittin.7    Smokeless tobacco: Never Used   Substance and Sexual Activity    Alcohol use: No     Alcohol/week: 0.0 standard drinks     Comment: in remission    Drug use: Not Currently     Types: Marijuana     Comment: 2-3x/month    Sexual activity: Yes     Partners: Female   Lifestyle    Physical activity:     Days per week: Not on file     Minutes per session: Not on file    Stress: Not on file   Relationships    Social connections:     Talks on phone: Not on file     Gets together: Not on file     Attends Hinduism service: Not on file     Active member of club or organization: Not on file     Attends meetings of clubs or organizations: Not on file     Relationship status: Not on file    Intimate partner violence:     Fear of current or ex partner: Not on file     Emotionally abused: Not on file     Physically abused: Not on file     Forced sexual activity: Not on file   Other Topics Concern    Not on file   Social History Narrative    Lives alone in [facility] [place]. Reports childhood was good, parents  when he was a baby, but he maintained and good relationship with both of them and still communicates with them frequently. He has 3 brothers, 1 sister - has a good relationship with them     Dropped out of school 10th grade since he was doing poorly, struggled with math, and wanted to seek employment. Current Facility-Administered Medications   Medication Dose Route Frequency Provider Last Rate Last Dose    levetiracetam (KEPPRA) 1000 mg/100 mL IVPB  1,000 mg Intravenous Once Sanchez Coleman MD        labetalol (NORMODYNE;TRANDATE) injection 10 mg  10 mg Intravenous Once Sanchez Coleman MD         Current Outpatient Medications   Medication Sig Dispense Refill    triamcinolone (KENALOG) 0.1 % cream Apply to itchy area on the left thigh twice daily until improved.  45 g 1    clopidogrel (PLAVIX) 75 MG tablet TAKE ONE TABLET BY MOUTH DAILY 30 tablet 5    cetirizine (ZYRTEC) 10 MG tablet TAKE ONE TABLET BY MOUTH DAILY 90 tablet 1    tiZANidine (ZANAFLEX) 4 MG tablet TAKE ONE TABLET BY MOUTH THREE TIMES A DAY 90 tablet 1    atorvastatin (LIPITOR) 40 MG tablet Take 1 tablet by mouth daily 90 tablet 2    aspirin (ASPIRIN LOW DOSE) 81 MG chewable tablet CHEW ONE TABLET BY MOUTH DAILY 90 tablet 1    metoprolol succinate (TOPROL XL) 25 MG extended release tablet TAKE ONE TABLET BY MOUTH DAILY 90 tablet 1    omeprazole (PRILOSEC) 20 MG delayed release capsule TAKE ONE CAPSULE BY MOUTH DAILY 30 capsule 5    lisinopril (PRINIVIL;ZESTRIL) 20 MG tablet TAKE ONE TABLET BY MOUTH DAILY 30 tablet 5    cyanocobalamin (CVS VITAMIN B12) 1000 MCG tablet Take 1 tablet by mouth daily 30 tablet 2    Emollient (AQUAPHILIC) OINT Apply daily to dry skin. 454 g 5     No Known Allergies    REVIEW OF SYSTEMS  Positives and pertinent negatives as per HPI. Ten other systems were reviewed and are negative. Nursing notes pertaining to ROS were reviewed. 12 LEAD EKG AS INTERPRETED BY ME:  NSR  RATE OF 63  LVH  NORMAL AXIS   NORMAL INTERVALS  NO ST-T SIGNS OF ACUTE ISCHEMIA OR INFARCT      PHYSICAL EXAM   BP (!) 139/99   Pulse 73   Temp 96.7 °F (35.9 °C) (Oral)   Resp 14   Ht 5' 9\" (1.753 m)   Wt 207 lb 14.3 oz (94.3 kg)   SpO2 97%   BMI 30.70 kg/m²   General: Alert and oriented x 3, NAD. No increased work of breathing or accessory muscle use. Appropriate and interactive  Eyes: PERRL, no scleral icterus, injection or exudate. EOMI. No nystagmus. HENT: Atraumatic. Oral pharynx is clear, moist, no enanthem. No tonsillar hypertropy or exudate. Nasal mucous membranes are clear. TM's are clear without evidence of otitis media. Neck:  No Lymphadenopathy. Non-tender to palpation. Normal ROM. No JVD. No thyromegaly. No Mass. PULMONARY: Lungs clear bilaterally without wheezes, rales or rhonchi. Good air movement bilaterally. CV: Regular rate and rhythm without murmurs, rubs or gallops. ABD: Soft, non-tender, non-distended, normal bowel sounds, no hepatosplenomegaly, no masses. No peritoneal signs, rebound or guarding. Back:  No CVAT, no rash. EXT: No cyanosis or clubbing. No rash. CR < 2 seconds. No tenderness to palpation. No lower extremity edema. +2 pulses in upper/lower extremities bilaterally. Skin is warm and dry. PSYCH: normal affect  NEURO: Alert and oriented x 3, NAD. Interactive. GCS 15.  CN 2-12 are intact. PERRL. EOMI. Visual fields are normal.  Fundi are sharp without papilledema. 5 of 5 LE strength that is bilaterally symmetric.  5 of 5 UE strength that is bilaterally symmetric. Normal sensation to light touch of the UE and LE.  2/4 DTR of the biceps, patellar and Achilles tendons. No clonus. Normal Romberg without pronator drift. Normal finger to nose testing without past pointing. No ataxia or truncal instability. NIHSS zero. RADIOLOGY    CT Cervical Spine WO Contrast   Final Result   No acute abnormality of the cervical spine. Mild degenerative changes mid cervical spine. Nonspecific bilateral mastoid effusion, nearly always benign, reactive,   however mastoiditis cannot be excluded based on CT appearance. Correlate   with history, presentation and physical findings. CT Head WO Contrast   Final Result   1. Large amount of subarachnoid hemorrhage seen within the basilar cisterns,   and prepontine cistern. Given the distribution, findings suggest ruptured   basilar artery aneurysm. 2. Encephalomalacia changes, right frontal lobe, and right parietooccipital   lobe, related to prior infarct   3. Dilated temporal tips, consistent with developing hydrocephalus   4. Critical results were called by Dr. Ryan Gonzalez to [facility] on   2/29/2020 at 23:16. LABS  I have reviewed all labs for this visit.    Results for orders placed or performed during the hospital encounter of 02/29/20   CBC Auto Differential   Result Value Ref Range    WBC 8.0 4.0 - 11.0 K/uL    RBC 3.96 (L) 4.20 - 5.90 M/uL    Hemoglobin 11.8 (L) 13.5 - 17.5 g/dL    Hematocrit 35.5 (L) 40.5 - 52.5 %    MCV 89.6 80.0 - 100.0 fL    MCH 29.8 26.0 - 34.0 pg    MCHC 33.3 31.0 - 36.0 g/dL    RDW 13.5 12.4 - 15.4 %    Platelets 364 864 - 019 K/uL    MPV 8.2 5.0 - 10.5 fL    Neutrophils % 43.4 %    Lymphocytes % 41.3 %    Monocytes % 9.6 %    Eosinophils % 4.7 %    Basophils % 1.0 %    Neutrophils Absolute 3.4 1.7 - 7.7 K/uL %.      Follow-up with:  No follow-up provider specified.         Radha Kimbrough MD  03/01/20 2016

## 2020-03-02 LAB
AVERAGE GLUCOSE: ABNORMAL
CHOLESTEROL, TOTAL: ABNORMAL
CHOLESTEROL/HDL RATIO: ABNORMAL
HBA1C MFR BLD: ABNORMAL %
HDLC SERPL-MCNC: ABNORMAL MG/DL
LDL CHOLESTEROL CALCULATED: ABNORMAL
TRIGL SERPL-MCNC: ABNORMAL MG/DL
VLDLC SERPL CALC-MCNC: ABNORMAL MG/DL

## 2020-03-07 LAB
BUN BLDV-MCNC: ABNORMAL MG/DL
CALCIUM SERPL-MCNC: ABNORMAL MG/DL
CHLORIDE BLD-SCNC: ABNORMAL MMOL/L
CO2: ABNORMAL
CREAT SERPL-MCNC: ABNORMAL MG/DL
GFR CALCULATED: ABNORMAL
GLUCOSE BLD-MCNC: ABNORMAL MG/DL
HCT VFR BLD CALC: NORMAL %
HEMOGLOBIN: NORMAL
PLATELET # BLD: NORMAL 10*3/UL
POTASSIUM SERPL-SCNC: ABNORMAL MMOL/L
SODIUM BLD-SCNC: ABNORMAL MMOL/L
WBC # BLD: NORMAL 10*3/UL

## 2020-03-10 ENCOUNTER — OFFICE VISIT (OUTPATIENT)
Dept: INTERNAL MEDICINE CLINIC | Age: 60
End: 2020-03-10
Payer: MEDICAID

## 2020-03-10 VITALS
OXYGEN SATURATION: 98 % | DIASTOLIC BLOOD PRESSURE: 76 MMHG | TEMPERATURE: 98.8 F | HEART RATE: 90 BPM | SYSTOLIC BLOOD PRESSURE: 126 MMHG | WEIGHT: 205 LBS | BODY MASS INDEX: 30.36 KG/M2 | HEIGHT: 69 IN

## 2020-03-10 PROCEDURE — 99214 OFFICE O/P EST MOD 30 MIN: CPT | Performed by: INTERNAL MEDICINE

## 2020-03-10 PROCEDURE — 3017F COLORECTAL CA SCREEN DOC REV: CPT | Performed by: INTERNAL MEDICINE

## 2020-03-10 PROCEDURE — 1036F TOBACCO NON-USER: CPT | Performed by: INTERNAL MEDICINE

## 2020-03-10 PROCEDURE — G8417 CALC BMI ABV UP PARAM F/U: HCPCS | Performed by: INTERNAL MEDICINE

## 2020-03-10 PROCEDURE — G8427 DOCREV CUR MEDS BY ELIG CLIN: HCPCS | Performed by: INTERNAL MEDICINE

## 2020-03-10 PROCEDURE — G8484 FLU IMMUNIZE NO ADMIN: HCPCS | Performed by: INTERNAL MEDICINE

## 2020-03-10 RX ORDER — TRAZODONE HYDROCHLORIDE 50 MG/1
50 TABLET ORAL NIGHTLY
Qty: 30 TABLET | Refills: 1 | Status: SHIPPED | OUTPATIENT
Start: 2020-03-10 | End: 2020-05-05

## 2020-03-10 RX ORDER — LISINOPRIL 5 MG/1
5 TABLET ORAL DAILY
COMMUNITY
End: 2020-09-21 | Stop reason: SDUPTHER

## 2020-03-10 RX ORDER — TEMAZEPAM 15 MG/1
15 CAPSULE ORAL NIGHTLY PRN
Qty: 14 CAPSULE | Refills: 0 | Status: SHIPPED | OUTPATIENT
Start: 2020-03-10 | End: 2020-03-24 | Stop reason: SDUPTHER

## 2020-03-10 RX ORDER — DULOXETIN HYDROCHLORIDE 30 MG/1
30 CAPSULE, DELAYED RELEASE ORAL DAILY
Qty: 30 CAPSULE | Refills: 5 | Status: SHIPPED | OUTPATIENT
Start: 2020-03-10 | End: 2020-09-03

## 2020-03-10 RX ORDER — TIZANIDINE 4 MG/1
TABLET ORAL
Qty: 90 TABLET | Refills: 1 | Status: SHIPPED | OUTPATIENT
Start: 2020-03-10 | End: 2020-07-22

## 2020-03-10 RX ORDER — GABAPENTIN 100 MG/1
100 CAPSULE ORAL 3 TIMES DAILY
COMMUNITY
Start: 2020-03-07 | End: 2020-04-08 | Stop reason: SDUPTHER

## 2020-03-10 NOTE — PROGRESS NOTES
SUBJECTIVE:  Elgie Barthel is a 64 y.o. male being evaluated for:    Chief Complaint   Patient presents with    Hypertension     6 mo f/u htn    Follow-Up from [address] [place] [address] 3/1-3/7/20 Acute ischemic stroke. Aspring 81mg and plavix 75mg on hold until 4/1/20 per neurosurgeon. HPI   Patient with severe headache and taken to ed found to have a sub arachnoid bleed  MRI with right cerebellar stroke and intraventricular hemorrhage and sent him to [facility] ICU   Trouble with vision  Getting up gets so nauseated HUrts all over and cannot get comfortable  Tried tylenol and not hurting Muscle relaxer not helping  On gabapentin and it is not helping either Body aching is keeping him up all night  Cannot relax at all  Side back  Unable to sleep  Ambien not helpful  NO chest pain or heart racing   Current Outpatient Medications   Medication Sig Dispense Refill    lisinopril (PRINIVIL;ZESTRIL) 5 MG tablet Take 5 mg by mouth daily      gabapentin (NEURONTIN) 100 MG capsule Take 100 mg by mouth 3 times daily.  traZODone (DESYREL) 50 MG tablet Take 1 tablet by mouth nightly 30 tablet 1    DULoxetine (CYMBALTA) 30 MG extended release capsule Take 1 capsule by mouth daily 30 capsule 5    tiZANidine (ZANAFLEX) 4 MG tablet 1/2 tablet tid 90 tablet 1    omeprazole (PRILOSEC) 20 MG delayed release capsule TAKE ONE CAPSULE BY MOUTH DAILY 30 capsule 3    triamcinolone (KENALOG) 0.1 % cream Apply to itchy area on the left thigh twice daily until improved. 45 g 1    cetirizine (ZYRTEC) 10 MG tablet TAKE ONE TABLET BY MOUTH DAILY 90 tablet 1    cyanocobalamin (CVS VITAMIN B12) 1000 MCG tablet Take 1 tablet by mouth daily 30 tablet 2    Emollient (AQUAPHILIC) OINT Apply daily to dry skin. 454 g 5    temazepam (RESTORIL) 15 MG capsule Take 1 capsule by mouth nightly as needed for Sleep for up to 60 days.  30 capsule 1    metoprolol succinate (TOPROL XL) 25 MG extended release tablet TAKE ONE TABLET BY MOUTH DAILY Social History Narrative    Lives alone in [facility] [place]. Reports childhood was good, parents  when he was a baby, but he maintained and good relationship with both of them and still communicates with them frequently. He has 3 brothers, 1 sister - has a good relationship with them     Dropped out of school 10th grade since he was doing poorly, struggled with math, and wanted to seek employment. Past Medical History:   Diagnosis Date    Alcohol abuse, in remission     2016 quit     Alcohol abuse, in remission     Arthritis     rt hip, hands    Asthma     Back pain     [facility] with [facility]     COPD [facility]     beatter since quit smoking in 2017    CVA (cerebral infarction) 4/28/13    affected L side     Depression     GERD (gastroesophageal reflux disease)     Hyperlipidemia     Hypertension     Insomnia     MRSA carrier     very long time ago, more than one year    Paresthesia of left leg     chronic problem    Paresthesia of right leg     Psoriasis (a type of skin inflammation)     Sciatic nerve pain, left     Sciatic nerve pain, right     Smoker     1.5 ppd     Past Surgical History:   Procedure Laterality Date    ABDOMEN SURGERY      APPENDECTOMY      ruptured     BACK SURGERY      maybe a fusion, L4 L5 herniated disc    CHOLECYSTECTOMY, LAPAROSCOPIC  2/13/2015    LAPAROSCOPIC CHOLECYSTECTOMY WITH CHOLANGIOGRAM, LYSIS OF    COLONOSCOPY  10/01/2018    [place] COLONOSCOPY FLX DX W/COLLJ SPEC WHEN PFRMD N/A 10/1/2018    COLONOSCOPY performed by Eli Siddiqui MD at [address] [place] [address]  4/24/13    for ischemic bowel    TONSILLECTOMY         Review of Systems   Constitutional: Positive for activity change. Negative for fever. HENT: Negative for nosebleeds. Eyes: Positive for visual disturbance.    Respiratory: Negative for cough and shortness of

## 2020-03-24 ENCOUNTER — TELEPHONE (OUTPATIENT)
Dept: FAMILY MEDICINE CLINIC | Age: 60
End: 2020-03-24

## 2020-03-24 RX ORDER — TEMAZEPAM 15 MG/1
15 CAPSULE ORAL NIGHTLY PRN
Qty: 30 CAPSULE | Refills: 1 | Status: SHIPPED | OUTPATIENT
Start: 2020-03-24 | End: 2020-05-23

## 2020-03-24 NOTE — TELEPHONE ENCOUNTER
Pt is calling a refill on   temazepam (RESTORIL) 15 MG capsule   Medication is working for him. Pt has None for tonight.      Emily Letters

## 2020-03-30 ASSESSMENT — ENCOUNTER SYMPTOMS
VOMITING: 0
DIARRHEA: 0
NAUSEA: 1
SHORTNESS OF BREATH: 0
COUGH: 0
ABDOMINAL PAIN: 0
BLOOD IN STOOL: 0

## 2020-04-08 ENCOUNTER — VIRTUAL VISIT (OUTPATIENT)
Dept: FAMILY MEDICINE CLINIC | Age: 60
End: 2020-04-08
Payer: MEDICAID

## 2020-04-08 PROCEDURE — G8417 CALC BMI ABV UP PARAM F/U: HCPCS | Performed by: INTERNAL MEDICINE

## 2020-04-08 PROCEDURE — G8427 DOCREV CUR MEDS BY ELIG CLIN: HCPCS | Performed by: INTERNAL MEDICINE

## 2020-04-08 PROCEDURE — 1036F TOBACCO NON-USER: CPT | Performed by: INTERNAL MEDICINE

## 2020-04-08 PROCEDURE — 3017F COLORECTAL CA SCREEN DOC REV: CPT | Performed by: INTERNAL MEDICINE

## 2020-04-08 PROCEDURE — 99214 OFFICE O/P EST MOD 30 MIN: CPT | Performed by: INTERNAL MEDICINE

## 2020-04-08 RX ORDER — GABAPENTIN 100 MG/1
100 CAPSULE ORAL 3 TIMES DAILY
Qty: 90 CAPSULE | Refills: 2 | Status: SHIPPED | OUTPATIENT
Start: 2020-04-08 | End: 2020-04-08 | Stop reason: SDUPTHER

## 2020-04-08 RX ORDER — GABAPENTIN 100 MG/1
100 CAPSULE ORAL 3 TIMES DAILY
Qty: 270 CAPSULE | Refills: 0 | Status: SHIPPED
Start: 2020-04-08 | End: 2020-07-06 | Stop reason: DRUGHIGH

## 2020-04-08 RX ORDER — ATORVASTATIN CALCIUM 40 MG/1
40 TABLET, FILM COATED ORAL NIGHTLY
COMMUNITY
Start: 2020-03-25 | End: 2021-01-12 | Stop reason: DRUGHIGH

## 2020-04-08 ASSESSMENT — ENCOUNTER SYMPTOMS
BLOOD IN STOOL: 0
VOMITING: 0
SHORTNESS OF BREATH: 0
DIARRHEA: 0
COUGH: 0
NAUSEA: 1
ABDOMINAL PAIN: 0

## 2020-05-05 RX ORDER — TRAZODONE HYDROCHLORIDE 50 MG/1
TABLET ORAL
Qty: 30 TABLET | Refills: 2 | Status: SHIPPED | OUTPATIENT
Start: 2020-05-05 | End: 2020-08-06

## 2020-05-08 NOTE — PROGRESS NOTES
Video visit  Place of visit home  Consent was given by patient  Patient and doctor were only present  30 minute appt
relationship with both of them and still communicates with them frequently. He has 3 brothers, 1 sister - has a good relationship with them     Dropped out of school 10th grade since he was doing poorly, struggled with math, and wanted to seek employment. Past Medical History:   Diagnosis Date    Alcohol abuse, in remission     2016 quit     Alcohol abuse, in remission     Arthritis     rt hip, hands    Asthma     Back pain     Cerebral artery occlusion with cerebral infarction ([address]     COPD (chronic obstructive pulmonary disease) (HCC)     beatter since quit smoking in 2017    CVA (cerebral infarction) 4/28/13    affected L side     Depression     GERD (gastroesophageal reflux disease)     Hyperlipidemia     Hypertension     Insomnia     MRSA carrier     very long time ago, more than one year    Paresthesia of left leg     chronic problem    Paresthesia of right leg     Psoriasis (a type of skin inflammation)     Sciatic nerve pain, left     Sciatic nerve pain, right     Smoker     1.5 ppd     Past Surgical History:   Procedure Laterality Date    ABDOMEN SURGERY      APPENDECTOMY      ruptured     BACK SURGERY      maybe a fusion, L4 L5 herniated disc    CHOLECYSTECTOMY, LAPAROSCOPIC  2/13/2015    LAPAROSCOPIC CHOLECYSTECTOMY WITH CHOLANGIOGRAM, LYSIS OF    COLONOSCOPY  10/01/2018    [place] COLONOSCOPY FLX DX W/COLLJ SPEC WHEN PFRMD N/A 10/1/2018    COLONOSCOPY performed by Brayan French MD at [address] [place] [address]  4/24/13    for ischemic bowel    TONSILLECTOMY         Review of Systems   Constitutional: Positive for activity change. Negative for fever. HENT: Negative for nosebleeds. Eyes: Positive for visual disturbance. Respiratory: Negative for cough and shortness of breath. Cardiovascular: Negative for chest pain, palpitations and leg swelling. Gastrointestinal: Positive for nausea.  Negative for abdominal pain, blood in stool, diarrhea and

## 2020-05-19 ENCOUNTER — VIRTUAL VISIT (OUTPATIENT)
Dept: FAMILY MEDICINE CLINIC | Age: 60
End: 2020-05-19
Payer: MEDICAID

## 2020-05-19 PROCEDURE — 99213 OFFICE O/P EST LOW 20 MIN: CPT | Performed by: INTERNAL MEDICINE

## 2020-05-19 PROCEDURE — 3017F COLORECTAL CA SCREEN DOC REV: CPT | Performed by: INTERNAL MEDICINE

## 2020-05-19 PROCEDURE — G8427 DOCREV CUR MEDS BY ELIG CLIN: HCPCS | Performed by: INTERNAL MEDICINE

## 2020-05-19 RX ORDER — FUROSEMIDE 20 MG/1
20 TABLET ORAL DAILY
Qty: 30 TABLET | Refills: 1 | Status: SHIPPED | OUTPATIENT
Start: 2020-05-19 | End: 2020-07-14

## 2020-05-19 NOTE — PROGRESS NOTES
Lola Verde is a 64 y.o. male being evaluated by a Virtual Visit (video visit) encounter to address concerns as mentioned above. A caregiver was present when appropriate. Due to this being a TeleHealth encounter (During NXIUN-90 public health emergency), evaluation of the following organ systems was limited: Vitals/Constitutional/EENT/Resp/CV/GI//MS/Neuro/Skin/Heme-Lymph-Imm. Pursuant to the emergency declaration under the [address] [place] [address] and the [employer] Act, this Virtual Visit was conducted with patient's (and/or legal guardian's) consent, to reduce the patient's risk of exposure to COVID-19 and provide necessary medical care. The patient (and/or legal guardian) has also been advised to contact this office for worsening conditions or problems, and seek emergency medical treatment and/or call 911 if deemed necessary. Patient identification was verified at the start of the visit: Yes    Total time spent for this encounter: Not billed by time    Services were provided through a video synchronous discussion virtually to substitute for in-person clinic visit. Patient and provider were located at their individual homes. --Jennifer Coleman MD on 5/24/2020 at 12:30 PM    An electronic signature was used to authenticate this note. SUBJECTIVE:  Lola Verde is a 64 y.o. male being evaluated for:    Chief Complaint   Patient presents with    Joint Swelling     b/l ankle swelling x 1-2 weeks.  pt reported vitals BP: 129/73 P:90    Leg Pain     b/l leg pain intermitted x 1-2 weeks, worse in right       HPI   Leg pain in calves and down to his feet more   More pain phil at night described as aching     No Known Allergies  Current Outpatient Medications   Medication Sig Dispense Refill    furosemide (LASIX) 20 MG tablet Take 1 tablet by mouth daily 30 tablet 1    traZODone (DESYREL) 50 MG tablet TAKE ONE TABLET BY MOUTH EVERY NIGHT 30 tablet 2    atorvastatin (LIPITOR) 40 MG tablet Take 40 mg by mouth nightly      gabapentin (NEURONTIN) 100 MG capsule Take 1 capsule by mouth 3 times daily for 90 days. 270 capsule 0    metoprolol succinate (TOPROL XL) 25 MG extended release tablet TAKE ONE TABLET BY MOUTH DAILY 30 tablet 2    aspirin (ASPIRIN LOW DOSE) 81 MG chewable tablet CHEW ONE TABLET BY MOUTH DAILY 30 tablet 2    lisinopril (PRINIVIL;ZESTRIL) 5 MG tablet Take 5 mg by mouth daily      DULoxetine (CYMBALTA) 30 MG extended release capsule Take 1 capsule by mouth daily 30 capsule 5    tiZANidine (ZANAFLEX) 4 MG tablet 1/2 tablet tid 90 tablet 1    omeprazole (PRILOSEC) 20 MG delayed release capsule TAKE ONE CAPSULE BY MOUTH DAILY 30 capsule 3    triamcinolone (KENALOG) 0.1 % cream Apply to itchy area on the left thigh twice daily until improved. 45 g 1    clopidogrel (PLAVIX) 75 MG tablet TAKE ONE TABLET BY MOUTH DAILY 30 tablet 5    cetirizine (ZYRTEC) 10 MG tablet TAKE ONE TABLET BY MOUTH DAILY 90 tablet 1    cyanocobalamin ([employer] VITAMIN B12) 1000 MCG tablet Take 1 tablet by mouth daily 30 tablet 2    Emollient (AQUAPHILIC) OINT Apply daily to dry skin. 454 g 5     No current facility-administered medications for this visit. Social History     Socioeconomic History    Marital status:      Spouse name: Not on file    Number of children: Not on file    Years of education: 8    Highest education level: Not on file   Occupational History    Occupation: unemployed     Comment: disability from back.  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   Social Needs    Financial resource strain: Not on file    Food insecurity     Worry: Not on file     Inability: Not on file   [employer] needs     Medical: Not on file     Non-medical: Not on file   Tobacco Use    Smoking status: Former Smoker     Packs/day: 1.00     Years: 8.00     Pack years: 8.00     Types: Cigarettes     Last very long time ago, more than one year    Paresthesia of left leg     chronic problem    Paresthesia of right leg     Psoriasis (a type of skin inflammation)     Sciatic nerve pain, left     Sciatic nerve pain, right     Smoker     1.5 ppd     Past Surgical History:   Procedure Laterality Date    ABDOMEN SURGERY      APPENDECTOMY      ruptured     BACK SURGERY      maybe a fusion, L4 L5 herniated disc    CHOLECYSTECTOMY, LAPAROSCOPIC  2/13/2015    LAPAROSCOPIC CHOLECYSTECTOMY WITH CHOLANGIOGRAM, LYSIS OF    COLONOSCOPY  10/01/2018    [place] COLONOSCOPY FLX DX W/COLLJ SPEC WHEN PFRMD N/A 10/1/2018    COLONOSCOPY performed by Gaviota Antonio MD at [address] [place] [address]  4/24/13    for ischemic bowel    TONSILLECTOMY         Review of Systems   Constitutional: Negative for chills and fever. HENT: Negative for nosebleeds and sore throat. Respiratory: Negative for cough and shortness of breath. Cardiovascular: Positive for leg swelling. Negative for chest pain and palpitations. Gastrointestinal: Negative for abdominal pain, diarrhea, nausea and vomiting. Genitourinary: Negative for difficulty urinating. Musculoskeletal: Positive for arthralgias and myalgias. Neurological: Negative for dizziness, light-headedness and headaches. OBJECTIVE:  There were no vitals taken for this visit. There is no height or weight on file to calculate BMI. Physical Exam  Constitutional:       General: He is not in acute distress. Appearance: Normal appearance. HENT:      Head: Normocephalic and atraumatic. Nose: No congestion. Eyes:      Conjunctiva/sclera: Conjunctivae normal.   Neck:      Musculoskeletal: Neck supple. Pulmonary:      Effort: Pulmonary effort is normal.   Musculoskeletal:         General: Swelling (but difficult to appreciate on video  ) present. Skin:     Findings: No erythema or rash. Neurological:      Mental Status: He is alert.

## 2020-05-24 ASSESSMENT — ENCOUNTER SYMPTOMS
ABDOMINAL PAIN: 0
DIARRHEA: 0
SHORTNESS OF BREATH: 0
NAUSEA: 0
VOMITING: 0
COUGH: 0
SORE THROAT: 0

## 2020-05-26 RX ORDER — TEMAZEPAM 15 MG/1
15 CAPSULE ORAL NIGHTLY PRN
Qty: 30 CAPSULE | Refills: 1 | Status: SHIPPED | OUTPATIENT
Start: 2020-05-26 | End: 2020-07-25

## 2020-06-08 RX ORDER — CETIRIZINE HYDROCHLORIDE 10 MG/1
TABLET ORAL
Qty: 30 TABLET | Refills: 5 | Status: SHIPPED | OUTPATIENT
Start: 2020-06-08 | End: 2020-06-18 | Stop reason: SDUPTHER

## 2020-06-09 RX ORDER — METOPROLOL SUCCINATE 25 MG/1
TABLET, EXTENDED RELEASE ORAL
Qty: 30 TABLET | Refills: 5 | Status: SHIPPED | OUTPATIENT
Start: 2020-06-09 | End: 2020-12-28

## 2020-06-18 RX ORDER — ASPIRIN 81 MG/1
TABLET, CHEWABLE ORAL
Qty: 30 TABLET | Refills: 2 | Status: SHIPPED | OUTPATIENT
Start: 2020-06-18 | End: 2020-09-03 | Stop reason: SDUPTHER

## 2020-06-18 RX ORDER — CETIRIZINE HYDROCHLORIDE 10 MG/1
TABLET ORAL
Qty: 30 TABLET | Refills: 5 | Status: SHIPPED | OUTPATIENT
Start: 2020-06-18 | End: 2020-12-09

## 2020-07-06 ENCOUNTER — VIRTUAL VISIT (OUTPATIENT)
Dept: FAMILY MEDICINE CLINIC | Age: 60
End: 2020-07-06
Payer: MEDICAID

## 2020-07-06 PROCEDURE — 99213 OFFICE O/P EST LOW 20 MIN: CPT | Performed by: INTERNAL MEDICINE

## 2020-07-06 RX ORDER — ALBUTEROL SULFATE 90 UG/1
2 AEROSOL, METERED RESPIRATORY (INHALATION) EVERY 6 HOURS PRN
Qty: 1 INHALER | Refills: 1 | Status: SHIPPED | OUTPATIENT
Start: 2020-07-06 | End: 2021-01-28

## 2020-07-06 RX ORDER — GABAPENTIN 300 MG/1
300 CAPSULE ORAL 3 TIMES DAILY
Qty: 90 CAPSULE | Refills: 2 | Status: SHIPPED | OUTPATIENT
Start: 2020-07-06 | End: 2020-07-08 | Stop reason: SDUPTHER

## 2020-07-06 NOTE — PROGRESS NOTES
Alejandro Moody is a 64 y.o. male evaluated via telephone on 7/6/2020. Consent:  He and/or health care decision maker is aware that that he may receive a bill for this telephone service, depending on his insurance coverage, and has provided verbal consent to proceed: Yes      Documentation:  I communicated with the patient and/or health care decision maker about see note below. Details of this discussion including any medical advice provided: see note below      I affirm this is a Patient Initiated Episode with a Patient who has not had a related appointment within my department in the past 7 days or scheduled within the next 24 hours. Patient identification was verified at the start of the visit: Yes    Total Time: minutes: 11-20 minutes    Note: not billable if this call serves to triage the patient into an appointment for the relevant concern      MAVERICK SALMERON     SUBJECTIVE: unable to get video to work   Alejandro Moody is a 64 y.o. male being evaluated for:    Chief Complaint   Patient presents with    Leg Pain     RLS flare up x 1 month. took medication years ago for rls.  Shortness of Breath     never used inhaler, request rx for inhaler       HPI   Rls  At night time legs get irrtable  Going on for last month and 1/2 or so  Swelling is better  Gabapentin  More trouble sleeping but feels it si more the RL in the evening    Sob for the last couple weeks  SOb with activity  COughing not productive No chest pain or heart racing    patient wants to try an inhaler.     Patient with a history of a lipoma in his chest sore to touch thinking about removal    No Known Allergies     Current Outpatient Medications   Medication Sig Dispense Refill    albuterol sulfate HFA (VENTOLIN HFA) 108 (90 Base) MCG/ACT inhaler Inhale 2 puffs into the lungs every 6 hours as needed for Shortness of Breath 1 Inhaler 1    cetirizine (ZYRTEC) 10 MG tablet TAKE ONE TABLET BY MOUTH DAILY 30 tablet 5    aspirin (ASPIRIN Non-medical: Not on file   Tobacco Use    Smoking status: Former Smoker     Packs/day: 1.00     Years: 8.00     Pack years: 8.00     Types: Cigarettes     Last attempt to quit: 2013     Years since quittin.1    Smokeless tobacco: Never Used   Substance and Sexual Activity    Alcohol use: No     Alcohol/week: 0.0 standard drinks     Comment: in remission    Drug use: Not Currently     Types: Marijuana     Comment: 2-3x/month    Sexual activity: Yes     Partners: Female   Lifestyle    Physical activity     Days per week: Not on file     Minutes per session: Not on file    Stress: Not on file   Relationships    Social connections     Talks on phone: Not on file     Gets together: Not on file     Attends [employer] service: Not on file     Active member of club or organization: Not on file     Attends meetings of clubs or organizations: Not on file     Relationship status: Not on file    Intimate partner violence     Fear of current or ex partner: Not on file     Emotionally abused: Not on file     Physically abused: Not on file     Forced sexual activity: Not on file   Other Topics Concern    Not on file   Social History Narrative    Lives alone in [facility] in [place]. Reports childhood was good, parents  when he was a baby, but he maintained and good relationship with both of them and still communicates with them frequently. He has 3 brothers, 1 sister - has a good relationship with them     Dropped out of school 10th grade since he was doing poorly, struggled with math, and wanted to seek employment.        Past Medical History:   Diagnosis Date    Alcohol abuse, in remission     2016 quit     Alcohol abuse, in remission     Arthritis     rt hip, hands    Asthma     Back pain     Cerebral artery occlusion with cerebral infarction ([facility] [address]     COPD (chronic obstructive pulmonary disease) ([facility]     beatter since quit smoking in 2017    CVA (cerebral infarction) 4/28/13    affected L side     Depression     GERD (gastroesophageal reflux disease)     Hyperlipidemia     Hypertension     Insomnia     MRSA carrier     very long time ago, more than one year    Paresthesia of left leg     chronic problem    Paresthesia of right leg     Psoriasis (a type of skin inflammation)     Sciatic nerve pain, left     Sciatic nerve pain, right     Smoker     1.5 ppd     Past Surgical History:   Procedure Laterality Date    ABDOMEN SURGERY      APPENDECTOMY      ruptured     BACK SURGERY      maybe a fusion, L4 L5 herniated disc    CHOLECYSTECTOMY, LAPAROSCOPIC  2/13/2015    LAPAROSCOPIC CHOLECYSTECTOMY WITH CHOLANGIOGRAM, LYSIS OF    COLONOSCOPY  10/01/2018    AL COLONOSCOPY FLX DX W/COLLJ SPEC WHEN PFRMD N/A 10/1/2018    COLONOSCOPY performed by Tuyet Bautista MD at [address] [place] [address]  4/24/13    for ischemic bowel    TONSILLECTOMY         Review of Systems   Constitutional: Positive for fatigue. Negative for fever. Respiratory: Positive for cough and shortness of breath. Cardiovascular: Negative for chest pain, palpitations and leg swelling (Improved). Gastrointestinal: Negative for abdominal pain, diarrhea, nausea and vomiting. Genitourinary: Negative for difficulty urinating. Neurological: Negative for dizziness, light-headedness and headaches. Psychiatric/Behavioral: Positive for sleep disturbance. OBJECTIVE:  There were no vitals taken for this visit. There is no height or weight on file to calculate BMI. blood pressure has been good  Heart rate in the high 90's      Physical Exam patient comfortable on the phone no acute trouble breathing. Answers all questions    ASSESSMENT/PLAN:    Jacinta Noonan was seen today for leg pain and shortness of breath. Diagnoses and all orders for this visit:    RLS (restless legs syndrome)  -      gabapentin (NEURONTIN) 300 MG capsule; Take 1 capsule by mouth 3 times daily for 90 days. Intended supply: 30 days      Insomnia, unspecified type see if treatment of restless leg helps  Chronic temazepam and working. [place] was reviewed    Lipoma of torso discussed possibly sending to a surgeon or dermatologist for removal.  Currently out of town and will wait till he returns    SOB (shortness of breath)  -     albuterol sulfate HFA (VENTOLIN HFA) 108 (90 Base) MCG/ACT inhaler; Inhale 2 puffs into the lungs every 6 hours as needed for Shortness of Breaths        Orders Placed This Encounter   Medications     gabapentin (NEURONTIN) 300 MG capsule     Sig: Take 1 capsule by mouth 3 times daily for 90 days. Intended supply: 30 days     Dispense:  90 capsule     Refill:  2    albuterol sulfate HFA (VENTOLIN HFA) 108 (90 Base) MCG/ACT inhaler     Sig: Inhale 2 puffs into the lungs every 6 hours as needed for Shortness of Breath     Dispense:  1 Inhaler     Refill:  1      Told if not better absolutely needs a visit  Return in about 3 months (around 10/6/2020), or if symptoms worsen or fail to improve. There are no Patient Instructions on file for this visit.

## 2020-07-08 ENCOUNTER — TELEPHONE (OUTPATIENT)
Dept: FAMILY MEDICINE CLINIC | Age: 60
End: 2020-07-08

## 2020-07-08 RX ORDER — GABAPENTIN 300 MG/1
300 CAPSULE ORAL 3 TIMES DAILY
Qty: 90 CAPSULE | Refills: 2 | Status: SHIPPED | OUTPATIENT
Start: 2020-07-08 | End: 2020-10-27

## 2020-07-08 NOTE — TELEPHONE ENCOUNTER
Wendi calling back they received the prescription for gabapentin but it didn't come over with a controlled substance signature.     [phone]

## 2020-07-14 RX ORDER — FUROSEMIDE 20 MG/1
TABLET ORAL
Qty: 30 TABLET | Refills: 3 | Status: SHIPPED | OUTPATIENT
Start: 2020-07-14 | End: 2020-11-30

## 2020-07-17 ASSESSMENT — ENCOUNTER SYMPTOMS
NAUSEA: 0
VOMITING: 0
SHORTNESS OF BREATH: 1
DIARRHEA: 0
COUGH: 1
ABDOMINAL PAIN: 0

## 2020-07-22 RX ORDER — TIZANIDINE 4 MG/1
TABLET ORAL
Qty: 45 TABLET | Refills: 2 | Status: SHIPPED | OUTPATIENT
Start: 2020-07-22 | End: 2020-09-21 | Stop reason: SDUPTHER

## 2020-08-01 RX ORDER — OMEPRAZOLE 20 MG/1
CAPSULE, DELAYED RELEASE ORAL
Qty: 30 CAPSULE | Refills: 5 | Status: SHIPPED | OUTPATIENT
Start: 2020-08-01 | End: 2020-08-06

## 2020-08-06 RX ORDER — OMEPRAZOLE 20 MG/1
CAPSULE, DELAYED RELEASE ORAL
Qty: 30 CAPSULE | Refills: 1 | Status: SHIPPED | OUTPATIENT
Start: 2020-08-06 | End: 2021-02-26

## 2020-08-06 RX ORDER — TRAZODONE HYDROCHLORIDE 50 MG/1
TABLET ORAL
Qty: 30 TABLET | Refills: 1 | Status: SHIPPED | OUTPATIENT
Start: 2020-08-06 | End: 2020-09-21 | Stop reason: SDUPTHER

## 2020-08-28 ENCOUNTER — HOSPITAL ENCOUNTER (OUTPATIENT)
Age: 60
Discharge: HOME | End: 2020-08-28
Payer: MEDICAID

## 2020-08-28 ENCOUNTER — TELEPHONE (OUTPATIENT)
Dept: FAMILY MEDICINE CLINIC | Age: 60
End: 2020-08-28

## 2020-08-28 ENCOUNTER — NURSE TRIAGE (OUTPATIENT)
Dept: OTHER | Facility: CLINIC | Age: 60
End: 2020-08-28

## 2020-08-28 ENCOUNTER — OFFICE VISIT (OUTPATIENT)
Dept: FAMILY MEDICINE CLINIC | Age: 60
End: 2020-08-28
Payer: MEDICAID

## 2020-08-28 ENCOUNTER — HOSPITAL ENCOUNTER (OUTPATIENT)
Dept: GENERAL RADIOLOGY | Age: 60
Discharge: HOME | End: 2020-08-28
Payer: MEDICAID

## 2020-08-28 VITALS
HEART RATE: 125 BPM | TEMPERATURE: 98.6 F | SYSTOLIC BLOOD PRESSURE: 112 MMHG | DIASTOLIC BLOOD PRESSURE: 70 MMHG | BODY MASS INDEX: 26.91 KG/M2 | OXYGEN SATURATION: 94 % | WEIGHT: 182.25 LBS

## 2020-08-28 PROBLEM — F10.21 ALCOHOL USE DISORDER, SEVERE, IN SUSTAINED REMISSION (HCC): Status: ACTIVE | Noted: 2020-08-28

## 2020-08-28 PROBLEM — F31.78 BIPOLAR DISORDER, IN FULL REMISSION, MOST RECENT EPISODE MIXED (HCC): Status: ACTIVE | Noted: 2020-08-28

## 2020-08-28 PROCEDURE — G8427 DOCREV CUR MEDS BY ELIG CLIN: HCPCS | Performed by: INTERNAL MEDICINE

## 2020-08-28 PROCEDURE — 3017F COLORECTAL CA SCREEN DOC REV: CPT | Performed by: INTERNAL MEDICINE

## 2020-08-28 PROCEDURE — 71046 X-RAY EXAM CHEST 2 VIEWS: CPT

## 2020-08-28 PROCEDURE — 93000 ELECTROCARDIOGRAM COMPLETE: CPT | Performed by: INTERNAL MEDICINE

## 2020-08-28 PROCEDURE — 1036F TOBACCO NON-USER: CPT | Performed by: INTERNAL MEDICINE

## 2020-08-28 PROCEDURE — 3023F SPIROM DOC REV: CPT | Performed by: INTERNAL MEDICINE

## 2020-08-28 PROCEDURE — G8926 SPIRO NO PERF OR DOC: HCPCS | Performed by: INTERNAL MEDICINE

## 2020-08-28 PROCEDURE — 99214 OFFICE O/P EST MOD 30 MIN: CPT | Performed by: INTERNAL MEDICINE

## 2020-08-28 PROCEDURE — G8417 CALC BMI ABV UP PARAM F/U: HCPCS | Performed by: INTERNAL MEDICINE

## 2020-08-28 ASSESSMENT — ENCOUNTER SYMPTOMS
COUGH: 1
SHORTNESS OF BREATH: 1
WHEEZING: 0
GASTROINTESTINAL NEGATIVE: 1

## 2020-08-28 NOTE — PATIENT INSTRUCTIONS
Please call your pharmacy if you need any refills of your medication(s). Please call our office at [phone] if you don't hear from us about your test results. Bring an accurate list of your medications with you at every appointment to ensure that we have the correct information.     Our office hours are: Monday - Friday 8:30 am- 5 pm    Phone lines turn on at 8:30 am

## 2020-08-28 NOTE — TELEPHONE ENCOUNTER
Reason for Disposition   MILD difficulty breathing (e.g., minimal/no SOB at rest, SOB with walking, pulse < 100) of new onset or worse than normal    Answer Assessment - Initial Assessment Questions  1. RESPIRATORY STATUS: \"Describe your breathing? \" (e.g., wheezing, shortness of breath, unable to speak, severe coughing)       Shortness of breath, dry cough  2. ONSET: \"When did this breathing problem begin? \"       Approx 2 weeks ago. 3. PATTERN \"Does the difficult breathing come and go, or has it been constant since it started? \"       Intermittent. 4. SEVERITY: \"How bad is your breathing? \" (e.g., mild, moderate, severe)     - MILD: No SOB at rest, mild SOB with walking, speaks normally in sentences, can lay down, no retractions, pulse < 100.     - MODERATE: SOB at rest, SOB with minimal exertion and prefers to sit, cannot lie down flat, speaks in phrases, mild retractions, audible wheezing, pulse 100-120.     - SEVERE: Very SOB at rest, speaks in single words, struggling to breathe, sitting hunched forward, retractions, pulse > 120       Mild- Moderate - worse with activty. 5. RECURRENT SYMPTOM: \"Have you had difficulty breathing before? \" If so, ask: \"When was the last time? \" and \"What happened that time? \"       Never happeded before. 6. CARDIAC HISTORY: \"Do you have any history of heart disease? \" (e.g., heart attack, angina, bypass surgery, angioplasty)       Previous hx of strokes, loop recorder. 7. LUNG HISTORY: \"Do you have any history of lung disease? \"  (e.g., pulmonary embolus, asthma, emphysema)      Borderline COPD  8. CAUSE: \"What do you think is causing the breathing problem? \"       Unsure  9. OTHER SYMPTOMS: \"Do you have any other symptoms? (e.g., dizziness, runny nose, cough, chest pain, fever)      Dry cough  10. PREGNANCY: \"Is there any chance you are pregnant? \" \"When was your last menstrual period? \"        N/A   11. TRAVEL: \"Have you traveled out of the country in the last month? \" (e.g., travel history, exposures)        No    Protocols used: BREATHING DIFFICULTY-ADULT-OH      Pt reports having SOB for two weeks. States mild-mod. Denies any known COVID exposure. Requests appt with PCP. Warm transfer to [facility]. Caller provided care advice and instructed to call back with worsening symptoms. Please do not respond to the triage nurse through this encounter. Any subsequent communication should be directly with the patient.

## 2020-08-28 NOTE — PROGRESS NOTES
Subjective:      Patient ID: Carina Villalobos is a 64 y.o. male. Patient presents with:  Cough: dry cough, some shortness of breath, couple wks. was in [place] when it started        Cough   Associated symptoms include shortness of breath. Pertinent negatives include no chest pain, chills or fever. Review of Systems   Constitutional: Negative for chills, diaphoresis and fever. HENT: Negative. Respiratory: Positive for cough and shortness of breath. Cardiovascular: Negative. Negative for chest pain. Gastrointestinal: Negative. Genitourinary: Negative. Musculoskeletal: Negative. Skin: Negative. Neurological: Negative for dizziness. Psychiatric/Behavioral: Negative for sleep disturbance.        Objective:   Physical Exam    Assessment:      ***      Plan:      ***        Arsh Morales MD

## 2020-08-28 NOTE — PROGRESS NOTES
Subjective:      Patient ID: Devin Bustamante is a 64 y.o. male. Patient presents with:   some shortness of breath and some cough. couple wks. [facility]. Hx of COPD and smoking till years ago. Denies any chest pain nor wheezing. Pulse O2 was over 90%.   Devin Bustamante is a 64 y.o. male with the [facility]:  Patient Active Problem List    Alcohol use disorder, severe, in sustained remission [facility])         Date Noted: 08/28/2020      Bipolar disorder, in full remission, most recent episode mixed ([facility] [address]         Date Noted: 08/28/2020      Mixed simple and mucopurulent chronic bronchitis ([facility] [place] [address]         Date Noted: 01/29/2018      Plantar fasciitis of right foot         Date Noted: 10/12/2017      Psoriasis (a type of skin inflammation)      Hypertension      Seasonal allergies         Date Noted: 06/14/2016      Patellofemoral arthritis         Date Noted: 04/15/2016      Calculus of gallbladder with other cholecystitis, without mention of obstruction         Date Noted: 01/15/2015      Stenosis of celiac artery ([facility] [place] [address]         Date Noted: 06/27/2013      Personal history of smoking         Date Noted: 06/27/2013      Alcohol abuse, in remission      Cerebral infarction [facility])         Date Noted: 04/29/2013      Ischemic necrosis of small bowel ([facility] [place] [address]         Date Noted: 04/29/2013      Bowel obstruction ([facility] [place] [address]         Date Noted: 04/24/2013      Degeneration of lumbar or lumbosacral intervertebral disc         Date Noted: 12/20/2011      Sciatica         Date Noted: 12/20/2011      Displacement of lumbar intervertebral disc without myelopathy         Date Noted: 07/11/2011      Current Outpatient Medications:  traZODone (DESYREL) 50 MG tablet, TAKE ONE TABLET BY MOUTH ONCE NIGHTLY, Disp: 30 tablet, Rfl: 1  omeprazole (PRILOSEC) 20 MG delayed release capsule, TAKE ONE CAPSULE BY MOUTH DAILY, Disp: 30 capsule, Rfl: 1  tiZANidine (ZANAFLEX) 4 MG tablet, TAKE ONE-HALF TABLET BY 2017 4/28/13: CVA (cerebral infarction)      Comment:  affected L side   No date: Depression  No date: GERD (gastroesophageal reflux disease)  No date: Hyperlipidemia  No date: Hypertension  No date:  Insomnia  No date: MRSA carrier      Comment:  very long time ago, more than one year  No date: Paresthesia of left leg      Comment:  chronic problem  No date: Paresthesia of right leg  No date: Psoriasis (a type of skin inflammation)  No date: Sciatic nerve pain, left  No date: Sciatic nerve pain, right  No date: Smoker      Comment:  1.5 ppd  Past Surgical History:  No date: ABDOMEN SURGERY  No date: APPENDECTOMY      Comment:  ruptured   No date: BACK SURGERY      Comment:  maybe a fusion, L4 L5 herniated disc  2/13/2015: CHOLECYSTECTOMY, LAPAROSCOPIC      Comment:  LAPAROSCOPIC CHOLECYSTECTOMY WITH CHOLANGIOGRAM, LYSIS                OF  10/01/2018: COLONOSCOPY  10/1/2018: AL COLONOSCOPY FLX DX W/COLLJ SPEC WHEN PFRMD; N/A      Comment:  COLONOSCOPY performed by Yoana Shaffer MD at [facility]  4/24/13: SMALL INTESTINE SURGERY      Comment:  for ischemic bowel  No date: TONSILLECTOMY  Review of patient's family history indicates:  Problem: Arthritis      Relation: Mother          Age of Onset: (Not Specified)  Problem: Heart Disease      Relation: Mother          Age of Onset: (Not Specified)          Comment: chf  Problem: Cancer      Relation: Father          Age of Onset: (Not Specified)          Comment: lung cancer   Problem: Heart Disease      Relation: Father          Age of Onset: (Not Specified)          Comment: CHF  Problem: No Known Problems      Relation: Maternal Grandmother          Age of Onset: (Not Specified)  Problem: No Known Problems      Relation: Maternal Grandfather          Age of Onset: (Not Specified)  Problem: No Known Problems      Relation: Paternal Grandmother          Age of Onset: (Not Specified)  Problem: No Known Problems      Relation: Paternal Grandfather          Age of Onset: (Not Specified)    Social History    Tobacco Use      Smoking status: Former Smoker        Packs/day: 1.00        Years: 8.00        Pack years: 8        Types: Cigarettes        Quit date: 2013        Years since quittin.2      Smokeless tobacco: Never Used    Alcohol use: No      Alcohol/week: 0.0 standard drinks      Comment: in remission        Shortness of Breath   This is a new problem. The current episode started 1 to 4 weeks ago. The problem occurs constantly. The problem has been unchanged. Pertinent negatives include no chest pain, leg swelling or wheezing. Nothing aggravates the symptoms. The patient has no known risk factors for DVT/PE. He has tried beta agonist inhalers for the symptoms. The treatment provided mild relief. His past medical history is significant for bronchiolitis and COPD. Review of Systems   Constitutional: Negative for appetite change and diaphoresis. HENT: Negative. Respiratory: Positive for cough and shortness of breath. Negative for wheezing. Cardiovascular: Negative for chest pain, palpitations and leg swelling. Gastrointestinal: Negative. Genitourinary: Negative. Musculoskeletal: Negative. Psychiatric/Behavioral: Negative for dysphoric mood. The patient is not hyperactive. Objective:   Physical Exam  Constitutional:       Appearance: He is well-developed. HENT:      Head: Normocephalic and atraumatic. Right Ear: External ear normal.      Left Ear: External ear normal.      Nose: Nose normal.   Eyes:      General: No scleral icterus. Right eye: No discharge. Left eye: No discharge. Conjunctiva/sclera: Conjunctivae normal.   Neck:      Musculoskeletal: Normal range of motion and neck supple. Thyroid: No thyromegaly. Vascular: No JVD. Trachea: No tracheal deviation. Cardiovascular:      Rate and Rhythm: Normal rate and regular rhythm.       Heart sounds: Normal heart sounds. No murmur. No friction rub. No gallop. Pulmonary:      Effort: Pulmonary effort is normal. No respiratory distress. Breath sounds: Normal breath sounds. No stridor. No wheezing or rales. Comments: Some demished breathing sounds. Chest x-rays take 5 m ago was neg. Chest:      Chest wall: No tenderness. Abdominal:      General: Bowel sounds are normal. There is no distension. Palpations: Abdomen is soft. There is no mass. Tenderness: There is no abdominal tenderness. There is no guarding or rebound. Comments: Large scar. Genitourinary:     Penis: Normal. No tenderness. Prostate: Normal.      Rectum: Normal. Guaiac result negative. Musculoskeletal:         General: No tenderness. Lymphadenopathy:      Cervical: No cervical adenopathy. Skin:     General: Skin is warm and dry. Coloration: Skin is not pale. Findings: No erythema or rash. Neurological:      Cranial Nerves: No cranial nerve deficit. Motor: No abnormal muscle tone. Coordination: Coordination normal.      Deep Tendon Reflexes: Reflexes are normal and symmetric. Reflexes normal.   Psychiatric:         Behavior: Behavior normal.         Thought Content: Thought content normal.         Judgment: Judgment normal.         Assessment:      Encounter Diagnoses   Name Primary?  Dyspnea, unspecified type Yes    Tachycardia     Mixed simple and mucopurulent chronic bronchitis (HCC)     Ischemic necrosis of small bowel (HCC)     [facility], [facility]     Bipolar disorder, in full remission, most recent episode mixed [facility])            Plan:      Courtney Kumar was seen today for cough.     Diagnoses and all orders for this visit:    Dyspnea, unspecified type  -     XR CHEST (2 VW)    Tachycardia  -     EKG 12 Lead    Mixed simple and mucopurulent chronic bronchitis (HCC)    Ischemic necrosis of small bowel (HCC)    Alcohol use disorder, severe, in sustained remission (HCC)    Bipolar disorder, in full remission, most recent episode mixed ([address]      Repeated O2 was >96%. EKG was not remarkable.         Margarita Padilla MD

## 2020-09-03 RX ORDER — DULOXETIN HYDROCHLORIDE 30 MG/1
CAPSULE, DELAYED RELEASE ORAL
Qty: 30 CAPSULE | Refills: 5 | Status: SHIPPED
Start: 2020-09-03 | End: 2021-01-12 | Stop reason: DRUGHIGH

## 2020-09-03 RX ORDER — ASPIRIN 81 MG/1
TABLET, CHEWABLE ORAL
Qty: 30 TABLET | Refills: 5 | Status: SHIPPED | OUTPATIENT
Start: 2020-09-03 | End: 2021-04-29

## 2020-09-18 ENCOUNTER — TELEPHONE (OUTPATIENT)
Dept: FAMILY MEDICINE CLINIC | Age: 60
End: 2020-09-18

## 2020-09-21 RX ORDER — LISINOPRIL 5 MG/1
5 TABLET ORAL DAILY
Qty: 90 TABLET | Refills: 1 | Status: SHIPPED | OUTPATIENT
Start: 2020-09-21 | End: 2021-03-17

## 2020-09-21 RX ORDER — TIZANIDINE 4 MG/1
4 TABLET ORAL EVERY 8 HOURS PRN
Qty: 90 TABLET | Refills: 1 | Status: SHIPPED | OUTPATIENT
Start: 2020-09-21 | End: 2020-12-09

## 2020-09-21 RX ORDER — TIZANIDINE 4 MG/1
4 TABLET ORAL EVERY 8 HOURS PRN
Qty: 90 TABLET | Refills: 1 | Status: SHIPPED | OUTPATIENT
Start: 2020-09-21 | End: 2020-09-21 | Stop reason: DRUGHIGH

## 2020-09-21 RX ORDER — TRAZODONE HYDROCHLORIDE 50 MG/1
TABLET ORAL
Qty: 90 TABLET | Refills: 0 | Status: SHIPPED | OUTPATIENT
Start: 2020-09-21 | End: 2020-12-18

## 2020-09-21 NOTE — TELEPHONE ENCOUNTER
Tizanidine 4 mg take one tablet every 8 hours prn muscle pain and spasm. Pharmacy informed of correct directions.

## 2020-09-27 RX ORDER — GABAPENTIN 100 MG/1
CAPSULE ORAL
Qty: 90 CAPSULE | Refills: 1 | OUTPATIENT
Start: 2020-09-27

## 2020-10-27 RX ORDER — GABAPENTIN 300 MG/1
CAPSULE ORAL
Qty: 90 CAPSULE | Refills: 2 | Status: SHIPPED
Start: 2020-10-27 | End: 2021-01-04 | Stop reason: DRUGHIGH

## 2020-11-30 RX ORDER — FUROSEMIDE 20 MG/1
TABLET ORAL
Qty: 30 TABLET | Refills: 5 | Status: SHIPPED | OUTPATIENT
Start: 2020-11-30 | End: 2021-05-27

## 2020-12-02 LAB
A/G RATIO: NORMAL
ALBUMIN SERPL-MCNC: NORMAL G/DL
ALP BLD-CCNC: NORMAL U/L
ALT SERPL-CCNC: NORMAL U/L
AST SERPL-CCNC: NORMAL U/L
BILIRUB SERPL-MCNC: NORMAL MG/DL
BILIRUBIN DIRECT: NORMAL
BILIRUBIN, INDIRECT: NORMAL
GLOBULIN: NORMAL
HCT VFR BLD CALC: NORMAL %
HEMOGLOBIN: NORMAL
PLATELET # BLD: NORMAL 10*3/UL
PROTEIN TOTAL: NORMAL
WBC # BLD: NORMAL 10*3/UL

## 2020-12-09 ENCOUNTER — OFFICE VISIT (OUTPATIENT)
Dept: FAMILY MEDICINE CLINIC | Age: 60
End: 2020-12-09
Payer: MEDICAID

## 2020-12-09 ENCOUNTER — TELEPHONE (OUTPATIENT)
Dept: FAMILY MEDICINE CLINIC | Age: 60
End: 2020-12-09

## 2020-12-09 VITALS
HEART RATE: 104 BPM | SYSTOLIC BLOOD PRESSURE: 100 MMHG | TEMPERATURE: 98.4 F | RESPIRATION RATE: 16 BRPM | DIASTOLIC BLOOD PRESSURE: 60 MMHG | OXYGEN SATURATION: 96 % | WEIGHT: 184 LBS | BODY MASS INDEX: 27.17 KG/M2

## 2020-12-09 LAB
BILIRUBIN, POC: NORMAL
BLOOD URINE, POC: NORMAL
CLARITY, POC: CLEAR
COLOR, POC: NORMAL
GLUCOSE URINE, POC: NORMAL
KETONES, POC: NORMAL
LEUKOCYTE EST, POC: NORMAL
NITRITE, POC: POSITIVE
PH, POC: 5.5
PROTEIN, POC: 30
SPECIFIC GRAVITY, POC: 1.03
UROBILINOGEN, POC: 1

## 2020-12-09 PROCEDURE — 99214 OFFICE O/P EST MOD 30 MIN: CPT | Performed by: INTERNAL MEDICINE

## 2020-12-09 PROCEDURE — 3017F COLORECTAL CA SCREEN DOC REV: CPT | Performed by: INTERNAL MEDICINE

## 2020-12-09 PROCEDURE — 1036F TOBACCO NON-USER: CPT | Performed by: INTERNAL MEDICINE

## 2020-12-09 PROCEDURE — G8484 FLU IMMUNIZE NO ADMIN: HCPCS | Performed by: INTERNAL MEDICINE

## 2020-12-09 PROCEDURE — G8427 DOCREV CUR MEDS BY ELIG CLIN: HCPCS | Performed by: INTERNAL MEDICINE

## 2020-12-09 PROCEDURE — G8417 CALC BMI ABV UP PARAM F/U: HCPCS | Performed by: INTERNAL MEDICINE

## 2020-12-09 PROCEDURE — 81002 URINALYSIS NONAUTO W/O SCOPE: CPT | Performed by: INTERNAL MEDICINE

## 2020-12-09 RX ORDER — TIZANIDINE 4 MG/1
TABLET ORAL
Qty: 90 TABLET | Refills: 2 | Status: SHIPPED | OUTPATIENT
Start: 2020-12-09 | End: 2021-03-15

## 2020-12-09 RX ORDER — PREDNISONE 20 MG/1
TABLET ORAL
Qty: 20 TABLET | Refills: 0 | Status: SHIPPED | OUTPATIENT
Start: 2020-12-09 | End: 2021-01-04 | Stop reason: ALTCHOICE

## 2020-12-09 RX ORDER — CETIRIZINE HYDROCHLORIDE 10 MG/1
TABLET ORAL
Qty: 90 TABLET | Refills: 1 | Status: SHIPPED | OUTPATIENT
Start: 2020-12-09 | End: 2021-06-14

## 2020-12-09 RX ORDER — TEMAZEPAM 15 MG/1
15 CAPSULE ORAL NIGHTLY
COMMUNITY
Start: 2020-09-17 | End: 2020-12-17 | Stop reason: SDUPTHER

## 2020-12-09 RX ORDER — ISOSORBIDE MONONITRATE 30 MG/1
30 TABLET, EXTENDED RELEASE ORAL DAILY
COMMUNITY
Start: 2020-12-04 | End: 2022-01-05 | Stop reason: SDUPTHER

## 2020-12-09 NOTE — TELEPHONE ENCOUNTER
Pt is having bad side pains that worsens  when lying down. Per pt when he lies down the pain goes from his side to his chest. Pt has been to the cardiologist and his heart was fine. Pt wants to know if he pulmonologist? Pl advise.

## 2020-12-09 NOTE — PROGRESS NOTES
SUBJECTIVE:  Daren Jauregui is a 64 y.o. male being evaluated for:    Chief Complaint   Patient presents with    Flank Pain     Patient started about 2 months ago with off/on left sided pain that radiates up into his chest near top of right breast and also radiates up into his back.      Shortness of Breath     SOB x 2 months, tires eaisly, tells everyone he can't breathe    Other     12/4/20 Left heart catheterization/Left ventriculogram/Coronary angiogram/ possible PCI       HPI Pain from groin up to his neck   Comes to his back and feels like being jabbed from back to his chest   Comes and goes but cannot relate it to anything  Has been bad for the last 3 days   Side back to chest  Worked up for cad and findings were negative  Vessels were Open SOB for the last couple of months  Coughing  Spits out phlegm as if still smoking   CXR normal    Quit smoking  4 years ago  2 packs a day prior   No Known Allergies  Current Outpatient Medications   Medication Sig Dispense Refill    predniSONE (DELTASONE) 20 MG tablet 3 pills daily for 3 days, 2 pills daily for 3 days, 1 pill daily for 3 days, 1/2 pill daily for 3 days 20 tablet 0    gabapentin (NEURONTIN) 300 MG capsule TAKE ONE CAPSULE BY MOUTH THREE TIMES A DAY 90 capsule 2    lisinopril (PRINIVIL;ZESTRIL) 5 MG tablet Take 1 tablet by mouth daily 90 tablet 1    aspirin (ASPIRIN LOW DOSE) 81 MG chewable tablet CHEW ONE TABLET BY MOUTH DAILY 30 tablet 5    DULoxetine (CYMBALTA) 30 MG extended release capsule TAKE ONE CAPSULE BY MOUTH DAILY 30 capsule 5    omeprazole (PRILOSEC) 20 MG delayed release capsule TAKE ONE CAPSULE BY MOUTH DAILY 30 capsule 1    albuterol sulfate HFA (VENTOLIN HFA) 108 (90 Base) MCG/ACT inhaler Inhale 2 puffs into the lungs every 6 hours as needed for Shortness of Breath 1 Inhaler 1    metoprolol succinate (TOPROL XL) 25 MG extended release tablet TAKE ONE TABLET BY MOUTH DAILY 30 tablet 5  atorvastatin (LIPITOR) 40 MG tablet Take 40 mg by mouth nightly      triamcinolone (KENALOG) 0.1 % cream Apply to itchy area on the left thigh twice daily until improved. 45 g 1    clopidogrel (PLAVIX) 75 MG tablet TAKE ONE TABLET BY MOUTH DAILY 30 tablet 5    cyanocobalamin ([employer] VITAMIN B12) 1000 MCG tablet Take 1 tablet by mouth daily 30 tablet 2    Emollient (AQUAPHILIC) OINT Apply daily to dry skin. 454 g 5    traZODone (DESYREL) 50 MG tablet TAKE ONE TABLET BY MOUTH ONCE NIGHTLY 90 tablet 0    temazepam (RESTORIL) 15 MG capsule Take 1 capsule by mouth nightly as needed for Sleep for up to 30 days. 30 capsule 0    cetirizine (ZYRTEC) 10 MG tablet TAKE ONE TABLET BY MOUTH DAILY 90 tablet 1    tiZANidine (ZANAFLEX) 4 MG tablet TAKE ONE TABLET BY MOUTH EVERY 8 HOURS AS NEEDED FOR MUSCLE PAIN AND SPASM 90 tablet 2    isosorbide mononitrate (IMDUR) 30 MG extended release tablet Take 30 mg by mouth daily      furosemide (LASIX) 20 MG tablet TAKE ONE TABLET BY MOUTH DAILY (Patient not taking: Reported on 2020) 30 tablet 5     No current facility-administered medications for this visit. Social History     Socioeconomic History    Marital status:      Spouse name: Not on file    Number of children: Not on file    Years of education: 8    Highest education level: Not on file   Occupational History    Occupation: unemployed     Comment: disability from back. 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   Social Needs    Financial resource strain: Not on file    Food insecurity     Worry: Not on file     Inability: Not on file   [employer] needs     Medical: Not on file     Non-medical: Not on file   Tobacco Use    Smoking status: Former Smoker     Packs/day: 1.00     Years: 8.00     Pack years: 8.00     Types: Cigarettes     Quit date: 2013     Years since quittin.5    Smokeless tobacco: Never Used   Substance and Sexual Activity    Alcohol use:  No Alcohol/week: 0.0 standard drinks     Comment: in remission    Drug use: Not Currently     Types: Marijuana     Comment: 2-3x/month    Sexual activity: Yes     Partners: Female   Lifestyle    Physical activity     Days per week: Not on file     Minutes per session: Not on file    Stress: Not on file   Relationships    Social connections     Talks on phone: Not on file     Gets together: Not on file     Attends [employer] service: Not on file     Active member of club or organization: Not on file     Attends meetings of clubs or organizations: Not on file     Relationship status: Not on file    Intimate partner violence     Fear of current or ex partner: Not on file     Emotionally abused: Not on file     Physically abused: Not on file     Forced sexual activity: Not on file   Other Topics Concern    Not on file   Social History Narrative    Lives alone in [facility] in [place]. Reports childhood was good, parents  when he was a baby, but he maintained and good relationship with both of them and still communicates with them frequently. He has 3 brothers, 1 sister - has a good relationship with them     Dropped out of school 10th grade since he was doing poorly, struggled with math, and wanted to seek employment.        Past Medical History:   Diagnosis Date    Alcohol abuse, in remission     2016 quit     Alcohol abuse, in remission     Arthritis     rt hip, hands    Asthma     Back pain     [facility]erebral [facility] occlusion with [facility]     COPD (chronic obstructive pulmonary disease) [facility]     beatter since quit smoking in 2017    CVA (cerebral infarction) 4/28/13    affected L side     Depression     GERD (gastroesophageal reflux disease)     Hyperlipidemia     Hypertension     Insomnia     MRSA carrier     very long time ago, more than one year    Paresthesia of left leg     chronic problem    Paresthesia of right leg  Psoriasis (a type of skin inflammation)     Sciatic nerve pain, left     Sciatic nerve pain, right     Smoker     1.5 ppd     Past Surgical History:   Procedure Laterality Date    ABDOMEN SURGERY      APPENDECTOMY      ruptured     BACK SURGERY      maybe a fusion, L4 L5 herniated disc    CHOLECYSTECTOMY, LAPAROSCOPIC  2/13/2015    LAPAROSCOPIC CHOLECYSTECTOMY WITH CHOLANGIOGRAM, LYSIS OF    COLONOSCOPY  10/01/2018    VT COLONOSCOPY FLX DX W/COLLJ SPEC WHEN PFRMD N/A 10/1/2018    COLONOSCOPY performed by Rose Flores MD at [facility]  4/24/13    for ischemic bowel    TONSILLECTOMY         Review of Systems   Constitutional: Positive for fatigue. Negative for appetite change, diaphoresis and fever. HENT: Negative for nosebleeds. Respiratory: Positive for cough and shortness of breath. Negative for wheezing. Cardiovascular: Positive for chest pain. Negative for palpitations and leg swelling. Gastrointestinal: Negative for abdominal pain, constipation, diarrhea, nausea and vomiting. Genitourinary: Negative for dysuria. Musculoskeletal: Positive for back pain and neck pain. Neurological: Negative for dizziness, light-headedness and headaches. OBJECTIVE:  /60 (Site: Left Upper Arm, Position: Sitting, Cuff Size: Medium Adult)   Pulse 104   Temp 98.4 °F (36.9 °C) (Oral)   Resp 16   Wt 184 lb (83.5 kg)   SpO2 96%   BMI 27.17 kg/m²      Body mass index is 27.17 kg/m². Physical Exam  Constitutional:       Appearance: Normal appearance. He is well-developed. HENT:      Head: Normocephalic and atraumatic. Right Ear: External ear normal.      Left Ear: External ear normal.      Nose: Nose normal.   Eyes:      Conjunctiva/sclera: Conjunctivae normal.   Neck:      Musculoskeletal: Normal range of motion and neck supple. Thyroid: No thyromegaly. Vascular: No carotid bruit or JVD. Trachea: No tracheal deviation. Cardiovascular:      Rate and Rhythm: Normal rate and regular rhythm. Heart sounds: Normal heart sounds. No murmur. No friction rub. No gallop. Pulmonary:      Effort: Pulmonary effort is normal.      Breath sounds: Normal breath sounds. Comments: Some demished breathing sounds. Chest x-rays take 5 m ago was neg. Chest:      Chest wall: No tenderness. Abdominal:      General: Bowel sounds are normal. There is no distension. Palpations: Abdomen is soft. Tenderness: There is no abdominal tenderness. Comments: No HSM. Genitourinary:     Penis: No tenderness. Musculoskeletal:         General: No swelling. Lymphadenopathy:      Cervical: No cervical adenopathy. Skin:     General: Skin is warm and dry. Neurological:      General: No focal deficit present. Motor: No abnormal muscle tone. Gait: Gait normal.      Deep Tendon Reflexes: Reflexes are normal and symmetric. Psychiatric:         Behavior: Behavior normal.         Thought Content: Thought content normal.         ASSESSMENT/PLAN:    Roland Burkitt was seen today for flank pain, shortness of breath and other. Diagnoses and all orders for this visit:    Chest pain, unspecified type  -     CT CHEST W CONTRAST; Future  -     predniSONE (DELTASONE) 20 MG tablet; 3 pills daily for 3 days, 2 pills daily for 3 days, 1 pill daily for 3 days, 1/2 pill daily for 3 days    Shortness of breath  -     CT CHEST W CONTRAST; Future    Right flank pain  -     POCT Urinalysis no Micro  -     Culture, Urine    Anemia, unspecified type  -     Ferritin; Future  -     Iron and TIBC; Future  -     CBC Auto Differential; Future    Abnormal weight loss  -     TSH without Reflex;  Future    Pre-diabetes  -     Hemoglobin A1C; Future        Orders Placed This Encounter   Medications    predniSONE (DELTASONE) 20 MG tablet     Sig: 3 pills daily for 3 days, 2 pills daily for 3 days, 1 pill daily for 3 days, 1/2 pill daily for 3 days Dispense:  20 tablet     Refill:  0        Return in about 4 weeks (around 1/6/2021), or if symptoms worsen or fail to improve. There are no Patient Instructions on file for this visit.

## 2020-12-10 DIAGNOSIS — D64.9 ANEMIA, UNSPECIFIED TYPE: ICD-10-CM

## 2020-12-10 DIAGNOSIS — R73.03 PRE-DIABETES: ICD-10-CM

## 2020-12-10 DIAGNOSIS — R63.4 ABNORMAL WEIGHT LOSS: ICD-10-CM

## 2020-12-10 LAB
FERRITIN: 223.9 NG/ML (ref 30–400)
IRON SATURATION: 11 % (ref 20–50)
IRON: 34 UG/DL (ref 59–158)
TOTAL IRON BINDING CAPACITY: 313 UG/DL (ref 260–445)
TSH SERPL DL<=0.05 MIU/L-ACNC: 3.33 UIU/ML (ref 0.27–4.2)

## 2020-12-11 LAB
BASOPHILS ABSOLUTE: 0.1 K/UL (ref 0–0.2)
BASOPHILS RELATIVE PERCENT: 0.9 %
EOSINOPHILS ABSOLUTE: 0.5 K/UL (ref 0–0.6)
EOSINOPHILS RELATIVE PERCENT: 4.5 %
ESTIMATED AVERAGE GLUCOSE: 114 MG/DL
HBA1C MFR BLD: 5.6 %
HCT VFR BLD CALC: 40 % (ref 40.5–52.5)
HEMOGLOBIN: 12.6 G/DL (ref 13.5–17.5)
LYMPHOCYTES ABSOLUTE: 1.6 K/UL (ref 1–5.1)
LYMPHOCYTES RELATIVE PERCENT: 14.7 %
MCH RBC QN AUTO: 28.3 PG (ref 26–34)
MCHC RBC AUTO-ENTMCNC: 31.4 G/DL (ref 31–36)
MCV RBC AUTO: 90.2 FL (ref 80–100)
MONOCYTES ABSOLUTE: 1.3 K/UL (ref 0–1.3)
MONOCYTES RELATIVE PERCENT: 12.4 %
NEUTROPHILS ABSOLUTE: 7.3 K/UL (ref 1.7–7.7)
NEUTROPHILS RELATIVE PERCENT: 67.5 %
PDW BLD-RTO: 15.2 % (ref 12.4–15.4)
PLATELET # BLD: 432 K/UL (ref 135–450)
PMV BLD AUTO: 8.4 FL (ref 5–10.5)
RBC # BLD: 4.44 M/UL (ref 4.2–5.9)
URINE CULTURE, ROUTINE: NORMAL
WBC # BLD: 10.8 K/UL (ref 4–11)

## 2020-12-16 ENCOUNTER — HOSPITAL ENCOUNTER (OUTPATIENT)
Dept: CT IMAGING | Age: 60
Discharge: HOME | End: 2020-12-16
Payer: MEDICAID

## 2020-12-16 PROCEDURE — 6360000004 HC RX CONTRAST MEDICATION: Performed by: INTERNAL MEDICINE

## 2020-12-16 PROCEDURE — 71260 CT THORAX DX C+: CPT

## 2020-12-16 RX ADMIN — IOPAMIDOL 75 ML: 755 INJECTION, SOLUTION INTRAVENOUS at 10:02

## 2020-12-17 DIAGNOSIS — I50.31 ACUTE DIASTOLIC CONGESTIVE HEART FAILURE (HCC): ICD-10-CM

## 2020-12-17 DIAGNOSIS — J90 PLEURAL EFFUSION: Primary | ICD-10-CM

## 2020-12-18 RX ORDER — TRAZODONE HYDROCHLORIDE 50 MG/1
TABLET ORAL
Qty: 90 TABLET | Refills: 0 | Status: SHIPPED | OUTPATIENT
Start: 2020-12-18 | End: 2021-03-17

## 2020-12-18 RX ORDER — TEMAZEPAM 15 MG/1
15 CAPSULE ORAL NIGHTLY PRN
Qty: 30 CAPSULE | Refills: 0 | Status: SHIPPED | OUTPATIENT
Start: 2020-12-18 | End: 2021-01-20

## 2020-12-20 ASSESSMENT — ENCOUNTER SYMPTOMS
VOMITING: 0
NAUSEA: 0
WHEEZING: 0
CONSTIPATION: 0
COUGH: 1
SHORTNESS OF BREATH: 1
BACK PAIN: 1
ABDOMINAL PAIN: 0
DIARRHEA: 0

## 2020-12-28 RX ORDER — METOPROLOL SUCCINATE 25 MG/1
TABLET, EXTENDED RELEASE ORAL
Qty: 30 TABLET | Refills: 4 | Status: SHIPPED | OUTPATIENT
Start: 2020-12-28 | End: 2021-09-16

## 2021-01-04 ENCOUNTER — OFFICE VISIT (OUTPATIENT)
Dept: FAMILY MEDICINE CLINIC | Age: 61
End: 2021-01-04
Payer: MEDICAID

## 2021-01-04 VITALS
TEMPERATURE: 97.9 F | HEIGHT: 69 IN | WEIGHT: 185.2 LBS | HEART RATE: 100 BPM | DIASTOLIC BLOOD PRESSURE: 74 MMHG | BODY MASS INDEX: 27.43 KG/M2 | SYSTOLIC BLOOD PRESSURE: 116 MMHG | OXYGEN SATURATION: 98 %

## 2021-01-04 DIAGNOSIS — F51.01 PRIMARY INSOMNIA: ICD-10-CM

## 2021-01-04 DIAGNOSIS — F31.78 BIPOLAR DISORDER, IN FULL REMISSION, MOST RECENT EPISODE MIXED (HCC): ICD-10-CM

## 2021-01-04 DIAGNOSIS — I10 ESSENTIAL HYPERTENSION: ICD-10-CM

## 2021-01-04 DIAGNOSIS — R06.09 DYSPNEA ON EXERTION: Primary | ICD-10-CM

## 2021-01-04 DIAGNOSIS — J90 PLEURAL EFFUSION: ICD-10-CM

## 2021-01-04 DIAGNOSIS — I63.311 CEREBRAL INFARCTION DUE TO THROMBOSIS OF RIGHT MIDDLE CEREBRAL ARTERY (HCC): ICD-10-CM

## 2021-01-04 PROCEDURE — G8484 FLU IMMUNIZE NO ADMIN: HCPCS | Performed by: INTERNAL MEDICINE

## 2021-01-04 PROCEDURE — G8427 DOCREV CUR MEDS BY ELIG CLIN: HCPCS | Performed by: INTERNAL MEDICINE

## 2021-01-04 PROCEDURE — G8417 CALC BMI ABV UP PARAM F/U: HCPCS | Performed by: INTERNAL MEDICINE

## 2021-01-04 PROCEDURE — 3017F COLORECTAL CA SCREEN DOC REV: CPT | Performed by: INTERNAL MEDICINE

## 2021-01-04 PROCEDURE — 1036F TOBACCO NON-USER: CPT | Performed by: INTERNAL MEDICINE

## 2021-01-04 PROCEDURE — 99214 OFFICE O/P EST MOD 30 MIN: CPT | Performed by: INTERNAL MEDICINE

## 2021-01-04 RX ORDER — ACETAMINOPHEN 500 MG
500 TABLET ORAL EVERY 6 HOURS PRN
COMMUNITY

## 2021-01-04 RX ORDER — FERROUS SULFATE 325(65) MG
325 TABLET ORAL
COMMUNITY

## 2021-01-04 RX ORDER — GABAPENTIN 600 MG/1
600 TABLET ORAL 3 TIMES DAILY
Qty: 90 TABLET | Refills: 3 | Status: SHIPPED | OUTPATIENT
Start: 2021-01-04 | End: 2021-05-10

## 2021-01-04 ASSESSMENT — ENCOUNTER SYMPTOMS
COUGH: 0
NAUSEA: 0
ABDOMINAL PAIN: 0
CONSTIPATION: 0
SHORTNESS OF BREATH: 1
DIARRHEA: 0
WHEEZING: 1
VOMITING: 0

## 2021-01-04 NOTE — PROGRESS NOTES
SUBJECTIVE:  Claudette Pencil is a 64 y.o. male being evaluated for:    Chief Complaint   Patient presents with    1 Month Follow-Up     chest pain/shortness of breath-request referral to pulm       HPI   Shortness of breath is continued  It comes and goes  One days okay and then the next few days out of breath Mostly exertional   No chest pain or heart racing Ct with effusion and atelectasis  Side pain better with the prednisone   [facility] cardiology  Stress echo done Recent left heart catheterization at [facility] Normal ef with normal arteries unchanged     Started water pills and iron pills  SOB is no better   No Known Allergies  Current Outpatient Medications   Medication Sig Dispense Refill    acetaminophen (TYLENOL) 500 MG tablet Take 500 mg by mouth every 6 hours as needed      ferrous sulfate (IRON 325) 325 (65 Fe) MG tablet Take 325 mg by mouth daily (with breakfast)      gabapentin (NEURONTIN) 600 MG tablet Take 1 tablet by mouth 3 times daily for 90 days. 90 tablet 3    metoprolol succinate (TOPROL XL) 25 MG extended release tablet TAKE ONE TABLET BY MOUTH DAILY 30 tablet 4    traZODone (DESYREL) 50 MG tablet TAKE ONE TABLET BY MOUTH ONCE NIGHTLY 90 tablet 0    temazepam (RESTORIL) 15 MG capsule Take 1 capsule by mouth nightly as needed for Sleep for up to 30 days.  30 capsule 0    cetirizine (ZYRTEC) 10 MG tablet TAKE ONE TABLET BY MOUTH DAILY 90 tablet 1    tiZANidine (ZANAFLEX) 4 MG tablet TAKE ONE TABLET BY MOUTH EVERY 8 HOURS AS NEEDED FOR MUSCLE PAIN AND SPASM 90 tablet 2    isosorbide mononitrate (IMDUR) 30 MG extended release tablet Take 30 mg by mouth daily      furosemide (LASIX) 20 MG tablet TAKE ONE TABLET BY MOUTH DAILY 30 tablet 5    lisinopril (PRINIVIL;ZESTRIL) 5 MG tablet Take 1 tablet by mouth daily 90 tablet 1    aspirin (ASPIRIN LOW DOSE) 81 MG chewable tablet CHEW ONE TABLET BY MOUTH DAILY 30 tablet 5  DULoxetine (CYMBALTA) 30 MG extended release capsule TAKE ONE CAPSULE BY MOUTH DAILY 30 capsule 5    omeprazole (PRILOSEC) 20 MG delayed release capsule TAKE ONE CAPSULE BY MOUTH DAILY 30 capsule 1    albuterol sulfate HFA (VENTOLIN HFA) 108 (90 Base) MCG/ACT inhaler Inhale 2 puffs into the lungs every 6 hours as needed for Shortness of Breath 1 Inhaler 1    atorvastatin (LIPITOR) 40 MG tablet Take 40 mg by mouth nightly      clopidogrel (PLAVIX) 75 MG tablet TAKE ONE TABLET BY MOUTH DAILY 30 tablet 5    cyanocobalamin ([employer]S VITAMIN B12) 1000 MCG tablet Take 1 tablet by mouth daily 30 tablet 2    Emollient (AQUAPHILIC) OINT Apply daily to dry skin. 454 g 5     No current facility-administered medications for this visit. 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   Social Needs    Financial resource strain: Not on file    Food insecurity     Worry: Not on file     Inability: Not on file   [email or web address] needs     Medical: Not on file     Non-medical: Not on file   Tobacco Use    Smoking status: Former Smoker     Packs/day: 1.00     Years: 8.00     Pack years: 8.00     Types: Cigarettes     Quit date: 2013     Years since quittin.6    Smokeless tobacco: Never Used   Substance and Sexual Activity    Alcohol use: No     Alcohol/week: 0.0 standard drinks     Comment: in remission    Drug use: Not Currently     Types: Marijuana     Comment: 2-3x/month    Sexual activity: Yes     Partners: Female   Lifestyle    Physical activity     Days per week: Not on file     Minutes per session: Not on file    Stress: Not on file   Relationships    Social connections     Talks on phone: Not on file  COLONOSCOPY  10/01/2018    [place] COLONOSCOPY FLX DX W/COLLJ SPEC WHEN PFRMD N/A 10/1/2018    COLONOSCOPY performed by Sonja Turpin MD at [facility]  4/24/13    for ischemic bowel    TONSILLECTOMY         Review of Systems   Constitutional: Positive for activity change and fatigue. Negative for appetite change, diaphoresis and fever. HENT: Negative for nosebleeds. Respiratory: Positive for shortness of breath and wheezing. Negative for cough. Cardiovascular: Negative for chest pain, palpitations and leg swelling. Gastrointestinal: Negative for abdominal pain, constipation, diarrhea, nausea and vomiting. Genitourinary: Negative for dysuria. Neurological: Negative for dizziness, light-headedness and headaches. Burning and tingling in his feet       OBJECTIVE:  /74   Pulse 100   Temp 97.9 °F (36.6 °C) (Temporal)   Ht 5' 9\" (1.753 m)   Wt 185 lb 3.2 oz (84 kg)   SpO2 98%   BMI 27.35 kg/m²      Body mass index is 27.35 kg/m². Physical Exam  Constitutional:       Appearance: Normal appearance. He is well-developed. HENT:      Head: Normocephalic and atraumatic. Right Ear: Tympanic membrane and ear canal normal.      Left Ear: Tympanic membrane and ear canal normal.      Nose: Nose normal.      Mouth/Throat:      Pharynx: Oropharynx is clear. Eyes:      Conjunctiva/sclera: Conjunctivae normal.   Neck:      Musculoskeletal: Neck supple. Thyroid: No thyromegaly. Vascular: No carotid bruit or JVD. Trachea: No tracheal deviation. Cardiovascular:      Rate and Rhythm: Normal rate and regular rhythm. Heart sounds: Normal heart sounds. No murmur. No friction rub. No gallop. Pulmonary:      Effort: Pulmonary effort is normal.      Breath sounds: Normal breath sounds. Chest:      Chest wall: No tenderness. Abdominal:      General: Bowel sounds are normal. There is no distension. Palpations: Abdomen is soft. Tenderness: There is no abdominal tenderness. Comments: No HSM. Genitourinary:     Penis: No tenderness. Musculoskeletal:         General: No swelling. Lymphadenopathy:      Cervical: No cervical adenopathy. Skin:     General: Skin is warm and dry. Neurological:      General: No focal deficit present. Motor: No abnormal muscle tone. Gait: Gait normal.      Deep Tendon Reflexes: Reflexes are normal and symmetric. Psychiatric:         Behavior: Behavior normal.         Thought Content: Thought content normal.         ASSESSMENT/PLAN:    Jessica White was seen today for 1 month follow-up. Diagnoses and all orders for this visit:    Dyspnea on exertion  -     CBC Auto Differential; Future  -     Comprehensive Metabolic Panel; Future  -     Mu Lang MD, Pulmonary, [place]    Pleural effusion small attempt to treat with water pills with no response question ct     Neuropathic pain. To increase his gabapentin  -     gabapentin (NEURONTIN) 600 MG tablet; Take 1 tablet by mouth 3 times daily for 90 days. Bipolar disorder, in full remission, most recent episode mixed [facility])    Essential hypertension controlled  -     Comprehensive Metabolic Panel; Future    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 ([facility] [address]  -     Lipid Panel; Future    Primary insomnia. Uses temazepam and OARRS was reviewed        Orders Placed This Encounter   Medications    gabapentin (NEURONTIN) 600 MG tablet     Sig: Take 1 tablet by mouth 3 times daily for 90 days. Dispense:  90 tablet     Refill:  3        Return in about 3 months (around 4/4/2021), or if symptoms worsen or fail to improve. There are no Patient Instructions on file for this visit.

## 2021-01-05 DIAGNOSIS — R06.00 DYSPNEA, UNSPECIFIED TYPE: Primary | ICD-10-CM

## 2021-01-08 ENCOUNTER — HOSPITAL ENCOUNTER (OUTPATIENT)
Dept: CARDIAC REHAB | Age: 61
Setting detail: THERAPIES SERIES
Discharge: HOME | End: 2021-01-08
Payer: MEDICAID

## 2021-01-08 ENCOUNTER — OFFICE VISIT (OUTPATIENT)
Dept: PRIMARY CARE CLINIC | Age: 61
End: 2021-01-08
Payer: MEDICAID

## 2021-01-08 DIAGNOSIS — Z01.812 PRE-PROCEDURAL LABORATORY EXAMINATIONS: Primary | ICD-10-CM

## 2021-01-08 PROCEDURE — 93798 PHYS/QHP OP CAR RHAB W/ECG: CPT

## 2021-01-08 PROCEDURE — G8428 CUR MEDS NOT DOCUMENT: HCPCS | Performed by: NURSE PRACTITIONER

## 2021-01-08 PROCEDURE — 99211 OFF/OP EST MAY X REQ PHY/QHP: CPT | Performed by: NURSE PRACTITIONER

## 2021-01-08 PROCEDURE — G8417 CALC BMI ABV UP PARAM F/U: HCPCS | Performed by: NURSE PRACTITIONER

## 2021-01-09 LAB — SARS-COV-2: NOT DETECTED

## 2021-01-11 ENCOUNTER — HOSPITAL ENCOUNTER (OUTPATIENT)
Dept: CARDIAC REHAB | Age: 61
Setting detail: THERAPIES SERIES
Discharge: HOME | End: 2021-01-11
Payer: MEDICAID

## 2021-01-11 ENCOUNTER — TELEPHONE (OUTPATIENT)
Dept: FAMILY MEDICINE CLINIC | Age: 61
End: 2021-01-11

## 2021-01-11 PROCEDURE — 93798 PHYS/QHP OP CAR RHAB W/ECG: CPT

## 2021-01-11 NOTE — TELEPHONE ENCOUNTER
Received note from pulmonary rehab stating positive depression score. Depressed due to increasing shortness of breath. He has an appointment with pulmonary upcoming. He is receiving pulmonary rehab. When we have him schedule an appointment see if we need to adjust his depression meds?

## 2021-01-12 ENCOUNTER — OFFICE VISIT (OUTPATIENT)
Dept: FAMILY MEDICINE CLINIC | Age: 61
End: 2021-01-12
Payer: MEDICAID

## 2021-01-12 VITALS
TEMPERATURE: 98.2 F | BODY MASS INDEX: 27.22 KG/M2 | SYSTOLIC BLOOD PRESSURE: 116 MMHG | HEIGHT: 69 IN | WEIGHT: 183.8 LBS | DIASTOLIC BLOOD PRESSURE: 70 MMHG | HEART RATE: 104 BPM | OXYGEN SATURATION: 98 %

## 2021-01-12 DIAGNOSIS — R06.09 DYSPNEA ON EXERTION: Primary | ICD-10-CM

## 2021-01-12 DIAGNOSIS — F32.9 REACTIVE DEPRESSION: ICD-10-CM

## 2021-01-12 PROCEDURE — G8427 DOCREV CUR MEDS BY ELIG CLIN: HCPCS | Performed by: INTERNAL MEDICINE

## 2021-01-12 PROCEDURE — G8484 FLU IMMUNIZE NO ADMIN: HCPCS | Performed by: INTERNAL MEDICINE

## 2021-01-12 PROCEDURE — G8417 CALC BMI ABV UP PARAM F/U: HCPCS | Performed by: INTERNAL MEDICINE

## 2021-01-12 PROCEDURE — 3017F COLORECTAL CA SCREEN DOC REV: CPT | Performed by: INTERNAL MEDICINE

## 2021-01-12 PROCEDURE — 1036F TOBACCO NON-USER: CPT | Performed by: INTERNAL MEDICINE

## 2021-01-12 PROCEDURE — 99213 OFFICE O/P EST LOW 20 MIN: CPT | Performed by: INTERNAL MEDICINE

## 2021-01-12 RX ORDER — BUDESONIDE AND FORMOTEROL FUMARATE DIHYDRATE 160; 4.5 UG/1; UG/1
2 AEROSOL RESPIRATORY (INHALATION) 2 TIMES DAILY
Qty: 1 INHALER | Refills: 2 | Status: SHIPPED | OUTPATIENT
Start: 2021-01-12 | End: 2021-07-12

## 2021-01-12 RX ORDER — DULOXETIN HYDROCHLORIDE 60 MG/1
60 CAPSULE, DELAYED RELEASE ORAL DAILY
Qty: 90 CAPSULE | Refills: 1 | Status: SHIPPED | OUTPATIENT
Start: 2021-01-12 | End: 2021-07-07

## 2021-01-12 RX ORDER — ATORVASTATIN CALCIUM 80 MG/1
80 TABLET, FILM COATED ORAL NIGHTLY
COMMUNITY
Start: 2021-01-05

## 2021-01-12 RX ORDER — BUDESONIDE AND FORMOTEROL FUMARATE DIHYDRATE 160; 4.5 UG/1; UG/1
2 AEROSOL RESPIRATORY (INHALATION) 2 TIMES DAILY
Qty: 1 INHALER | Refills: 2 | Status: SHIPPED | OUTPATIENT
Start: 2021-01-12 | End: 2021-01-12 | Stop reason: SDUPTHER

## 2021-01-12 NOTE — PROGRESS NOTES
capsule 1    albuterol sulfate HFA (VENTOLIN HFA) 108 (90 Base) MCG/ACT inhaler Inhale 2 puffs into the lungs every 6 hours as needed for Shortness of Breath 1 Inhaler 1    clopidogrel (PLAVIX) 75 MG tablet TAKE ONE TABLET BY MOUTH DAILY 30 tablet 5    cyanocobalamin (CVS VITAMIN B12) 1000 MCG tablet Take 1 tablet by mouth daily 30 tablet 2    temazepam (RESTORIL) 15 MG capsule Take 1 capsule by mouth nightly as needed for Sleep for up to 60 days. 30 capsule 1    SYMBICORT 160-4.5 MCG/ACT AERO Inhale 2 puffs into the lungs 2 times daily 1 Inhaler 2     No current facility-administered medications for this visit. 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   Social Needs    Financial resource strain: Not on file    Food insecurity     Worry: Not on file     Inability: Not on file   [employer] needs     Medical: Not on file     Non-medical: Not on file   Tobacco Use    Smoking status: Former Smoker     Packs/day: 1.00     Types: Cigarettes     Start date:      Quit date: 2013     Years since quittin.6    Smokeless tobacco: Never Used    Tobacco comment: off and on  was up to 2 packs a day   Substance and Sexual Activity    Alcohol use: No     Alcohol/week: 0.0 standard drinks     Comment: in remission    Drug use: Not Currently     Types: Marijuana     Comment: 2-3x/month    Sexual activity: Yes     Partners: Female   Lifestyle    Physical activity     Days per week: Not on file     Minutes per session: Not on file    Stress: Not on file   Relationships    Social connections     Talks on phone: Not on file     Gets together: Not on file     Attends Anabaptist service: Not on file     Active member of club or organization: Not on file     Attends meetings of clubs or organizations: Not on file     Relationship status: Not on file    Intimate partner violence     Fear of current or ex partner: Not on file     Emotionally abused: Not on file     Physically abused: Not on file     Forced sexual activity: Not on file   Other Topics Concern    Not on file   Social History Narrative    Lives alone in [facility] in [place] [facility] [place] [address] [place]. Reports childhood was good, parents  when he was a baby, but he maintained and good relationship with both of them and still communicates with them frequently. He has 3 brothers, 1 sister - has a good relationship with them     Dropped out of school 10th grade since he was doing poorly, struggled with math, and wanted to seek employment.        Past Medical History:   Diagnosis Date    Alcohol abuse, in remission     2016 quit     Alcohol abuse, in remission     Arthritis     rt hip, hands    Asthma     Back pain     Cerebral artery occlusion with cerebral infarction ([facility] [address]     COPD (chronic obstructive pulmonary disease) (HCC)     beatter since quit smoking in 2017    CVA (cerebral infarction) 4/28/13    affected L side     Depression     GERD (gastroesophageal reflux disease)     Hyperlipidemia     Hypertension     Insomnia     MRSA carrier     very long time ago, more than one year    Paresthesia of left leg     chronic problem    Paresthesia of right leg     Psoriasis (a type of skin inflammation)     Sciatic nerve pain, left     Sciatic nerve pain, right     Smoker     1.5 ppd     Past Surgical History:   Procedure Laterality Date    ABDOMEN SURGERY      APPENDECTOMY      ruptured     BACK SURGERY      maybe a fusion, L4 L5 herniated disc    CHOLECYSTECTOMY, LAPAROSCOPIC  2/13/2015    LAPAROSCOPIC CHOLECYSTECTOMY WITH CHOLANGIOGRAM, LYSIS OF    COLONOSCOPY  10/01/2018    [place] COLONOSCOPY FLX DX W/COLLJ SPEC WHEN PFRMD N/A 10/1/2018 COLONOSCOPY performed by Anand Suarez MD at [address] [facility]  4/24/13    for ischemic bowel    TONSILLECTOMY         Review of Systems   Constitutional: Positive for activity change and fatigue. Negative for diaphoresis and unexpected weight change. HENT: Positive for postnasal drip. Negative for nosebleeds. Respiratory: Positive for cough and wheezing. Negative for shortness of breath. Cardiovascular: Negative for chest pain, palpitations and leg swelling. Gastrointestinal: Negative for abdominal pain, constipation, diarrhea, nausea and vomiting. No heartburn   Genitourinary: Negative for dysuria. Neurological: Negative for dizziness, light-headedness and headaches. Psychiatric/Behavioral: Positive for dysphoric mood. Negative for suicidal ideas. OBJECTIVE:  /70   Pulse 104   Temp 98.2 °F (36.8 °C) (Temporal)   Ht 5' 9\" (1.753 m)   Wt 183 lb 12.8 oz (83.4 kg)   SpO2 98%   BMI 27.14 kg/m²      Body mass index is 27.14 kg/m². Physical Exam  Constitutional:       Appearance: Normal appearance. He is well-developed. HENT:      Head: Normocephalic and atraumatic. Right Ear: External ear normal.      Left Ear: External ear normal.      Mouth/Throat:      Pharynx: Oropharynx is clear. Eyes:      Conjunctiva/sclera: Conjunctivae normal.   Neck:      Musculoskeletal: Neck supple. Thyroid: No thyromegaly. Vascular: No carotid bruit or JVD. Trachea: No tracheal deviation. Cardiovascular:      Rate and Rhythm: Normal rate and regular rhythm. Heart sounds: Normal heart sounds. No murmur. No friction rub. No gallop. Pulmonary:      Effort: Pulmonary effort is normal.      Breath sounds: Normal breath sounds. Chest:      Chest wall: No tenderness. Abdominal:      General: There is no distension. Palpations: Abdomen is soft. Tenderness: There is no abdominal tenderness. Comments: No HSM.    Genitourinary: Penis: No tenderness. Musculoskeletal:         General: No swelling. Lymphadenopathy:      Cervical: No cervical adenopathy. Skin:     General: Skin is warm and dry. Neurological:      General: No focal deficit present. Motor: No abnormal muscle tone. Gait: Gait normal.      Deep Tendon Reflexes: Reflexes are normal and symmetric. Psychiatric:         Behavior: Behavior normal.         Thought Content: Thought content normal.         ASSESSMENT/PLAN:    Mono Vaughn was seen today for depression. Diagnoses and all orders for this visit:    Dyspnea on exertion negative cardiac evaluation reviewed their findings. PFTs an appointment with pulmonary in the future  -    : budesonide-formoterol (SYMBICORT) 160-4.5 MCG/ACT AERO; Inhale 2 puffs into the lungs 2 times daily    Reactive depression is doing some rehab therapy  -     DULoxetine (CYMBALTA) 60 MG extended release capsule; Take 1 capsule by mouth daily        Orders Placed This Encounter   Medications    budesonide-formoterol (SYMBICORT) 160-4.5 MCG/ACT AERO     Sig: Inhale 2 puffs into the lungs 2 times daily     Dispense:  1 Inhaler     Refill:  2    DULoxetine (CYMBALTA) 60 MG extended release capsule     Sig: Take 1 capsule by mouth daily     Dispense:  90 capsule     Refill:  1        Return in about 2 months (around 3/12/2021), or if symptoms worsen or fail to improve. There are no Patient Instructions on file for this visit.

## 2021-01-13 ENCOUNTER — APPOINTMENT (OUTPATIENT)
Dept: CARDIAC REHAB | Age: 61
End: 2021-01-13
Payer: MEDICAID

## 2021-01-14 ENCOUNTER — HOSPITAL ENCOUNTER (OUTPATIENT)
Dept: PULMONOLOGY | Age: 61
Discharge: HOME | End: 2021-01-14
Payer: MEDICAID

## 2021-01-14 DIAGNOSIS — R06.00 DYSPNEA, UNSPECIFIED TYPE: ICD-10-CM

## 2021-01-14 LAB
DLCO %PRED: 55 %
DLCO PRED: NORMAL
DLCO/VA %PRED: NORMAL
DLCO/VA PRED: NORMAL
DLCO/VA: NORMAL
DLCO: NORMAL
EXPIRATORY TIME-POST: NORMAL
EXPIRATORY TIME: NORMAL
FEF 25-75% %CHNG: NORMAL
FEF 25-75% %PRED-POST: NORMAL
FEF 25-75% %PRED-PRE: NORMAL
FEF 25-75% PRED: NORMAL
FEF 25-75%-POST: NORMAL
FEF 25-75%-PRE: NORMAL
FEV1 %PRED-POST: 51 %
FEV1 %PRED-PRE: 47 %
FEV1 PRED: NORMAL
FEV1-POST: NORMAL
FEV1-PRE: NORMAL
FEV1/FVC %PRED-POST: NORMAL
FEV1/FVC %PRED-PRE: NORMAL
FEV1/FVC PRED: NORMAL
FEV1/FVC-POST: 86 %
FEV1/FVC-PRE: 81 %
FVC %PRED-POST: NORMAL
FVC %PRED-PRE: NORMAL
FVC PRED: NORMAL
FVC-POST: NORMAL
FVC-PRE: NORMAL
GAW %PRED: NORMAL
GAW PRED: NORMAL
GAW: NORMAL
IC %PRED: NORMAL
IC PRED: NORMAL
IC: NORMAL
MEP: NORMAL
MIP: NORMAL
MVV %PRED-PRE: NORMAL
MVV PRED: NORMAL
MVV-PRE: NORMAL
PEF %PRED-POST: NORMAL
PEF %PRED-PRE: NORMAL
PEF PRED: NORMAL
PEF%CHNG: NORMAL
PEF-POST: NORMAL
PEF-PRE: NORMAL
RAW %PRED: NORMAL
RAW PRED: NORMAL
RAW: NORMAL
RV %PRED: NORMAL
RV PRED: NORMAL
RV: NORMAL
SVC %PRED: NORMAL
SVC PRED: NORMAL
SVC: NORMAL
TLC %PRED: 84 %
TLC PRED: NORMAL
TLC: NORMAL
VA %PRED: NORMAL
VA PRED: NORMAL
VA: NORMAL
VTG %PRED: NORMAL
VTG PRED: NORMAL
VTG: NORMAL

## 2021-01-14 PROCEDURE — 6370000000 HC RX 637 (ALT 250 FOR IP): Performed by: INTERNAL MEDICINE

## 2021-01-14 PROCEDURE — 94060 EVALUATION OF WHEEZING: CPT | Performed by: INTERNAL MEDICINE

## 2021-01-14 PROCEDURE — 94060 EVALUATION OF WHEEZING: CPT

## 2021-01-14 PROCEDURE — 94726 PLETHYSMOGRAPHY LUNG VOLUMES: CPT

## 2021-01-14 PROCEDURE — 94729 DIFFUSING CAPACITY: CPT | Performed by: INTERNAL MEDICINE

## 2021-01-14 PROCEDURE — 94729 DIFFUSING CAPACITY: CPT

## 2021-01-14 PROCEDURE — 94727 GAS DIL/WSHOT DETER LNG VOL: CPT | Performed by: INTERNAL MEDICINE

## 2021-01-14 PROCEDURE — 94640 AIRWAY INHALATION TREATMENT: CPT

## 2021-01-14 RX ORDER — ALBUTEROL SULFATE 90 UG/1
4 AEROSOL, METERED RESPIRATORY (INHALATION) ONCE
Status: COMPLETED | OUTPATIENT
Start: 2021-01-14 | End: 2021-01-14

## 2021-01-14 RX ADMIN — ALBUTEROL SULFATE 4 PUFF: 90 AEROSOL, METERED RESPIRATORY (INHALATION) at 13:23

## 2021-01-14 ASSESSMENT — PULMONARY FUNCTION TESTS
FEV1_PERCENT_PREDICTED_PRE: 47
FEV1_PERCENT_PREDICTED_POST: 51
FEV1/FVC_POST: 86
FEV1/FVC_PRE: 81

## 2021-01-15 ENCOUNTER — HOSPITAL ENCOUNTER (OUTPATIENT)
Dept: CARDIAC REHAB | Age: 61
Setting detail: THERAPIES SERIES
Discharge: HOME | End: 2021-01-15
Payer: MEDICAID

## 2021-01-15 PROCEDURE — 93798 PHYS/QHP OP CAR RHAB W/ECG: CPT

## 2021-01-15 NOTE — PROCEDURES
Spirometry was acceptable and reproducible by ATS standards    Spirometry  1. Spirometry shows severe obstructive defect. Bronchodilator  1. There is no response to bronchodilator demonstrated. [Increase in FEV1 and/or FVC => 12% of control and => 200 ml]    Lung Volumes  2. Lung volumes are normal.    Diffusing Capacity  3. Diffusing capacity is moderately decreased. [40-60%]        Overall Interpretation  PFT does  demonstrates obstruction with FEV1 of 47 % FVC of 58%  without bronchodilator response with associated decrease in diffusion capacity. Air trapping is not present. Hyperinflation is not present. This consistent with Severe COPD.   Clinical correlation needed     Pulmonary Function Testing Results:    FEV1 %Pred-Pre   Date Value Ref Range Status   01/15/2021 47 % Final     FEV1 %Pred-Post   Date Value Ref Range Status   01/15/2021 51 % Final     FEV1/FVC-Pre   Date Value Ref Range Status   01/15/2021 81 % Final     FEV1/FVC-Post   Date Value Ref Range Status   01/15/2021 86 % Final     TLC %Pred   Date Value Ref Range Status   01/15/2021 84 % Final     DLCO %Pred   Date Value Ref Range Status   01/15/2021 55 % Final             OBSTRUCTION % Predicted FEV1   MILD >70%   MODERATE 60-69%   MODERATELY-SEVERE 50-59%   SEVERE 35-49%   VERY SEVERE <35%         RESTRICTION % Predicted TLC   MILD Less than LLN but > than 70%   MODERATE 60-69%   MODERATELY-SEVERE <60%                 DIFFUSION CAPACITY DL,CO % Pred   MILD >60% AND < LLN   MODERATE 40-60%   SEVERE <40%       PFT data will be scanned into the procedure tab in epic under this encounter    MD Meghann Dickson Pulmonology

## 2021-01-18 ENCOUNTER — HOSPITAL ENCOUNTER (OUTPATIENT)
Dept: CARDIAC REHAB | Age: 61
Setting detail: THERAPIES SERIES
Discharge: HOME | End: 2021-01-18
Payer: MEDICAID

## 2021-01-18 PROCEDURE — 93798 PHYS/QHP OP CAR RHAB W/ECG: CPT

## 2021-01-20 ENCOUNTER — HOSPITAL ENCOUNTER (OUTPATIENT)
Dept: CARDIAC REHAB | Age: 61
Setting detail: THERAPIES SERIES
Discharge: HOME | End: 2021-01-20
Payer: MEDICAID

## 2021-01-20 DIAGNOSIS — G47.00 INSOMNIA, UNSPECIFIED TYPE: ICD-10-CM

## 2021-01-20 PROCEDURE — 93798 PHYS/QHP OP CAR RHAB W/ECG: CPT

## 2021-01-20 RX ORDER — TEMAZEPAM 15 MG/1
15 CAPSULE ORAL NIGHTLY PRN
Qty: 30 CAPSULE | Refills: 1 | Status: SHIPPED | OUTPATIENT
Start: 2021-01-20 | End: 2021-03-16 | Stop reason: SDUPTHER

## 2021-01-22 ENCOUNTER — HOSPITAL ENCOUNTER (OUTPATIENT)
Dept: CARDIAC REHAB | Age: 61
Setting detail: THERAPIES SERIES
Discharge: HOME | End: 2021-01-22
Payer: MEDICAID

## 2021-01-22 PROCEDURE — 93798 PHYS/QHP OP CAR RHAB W/ECG: CPT

## 2021-01-22 ASSESSMENT — ENCOUNTER SYMPTOMS
VOMITING: 0
SHORTNESS OF BREATH: 0
WHEEZING: 1
COUGH: 1
ABDOMINAL PAIN: 0
DIARRHEA: 0
CONSTIPATION: 0
NAUSEA: 0

## 2021-01-25 ENCOUNTER — HOSPITAL ENCOUNTER (OUTPATIENT)
Dept: CARDIAC REHAB | Age: 61
Setting detail: THERAPIES SERIES
Discharge: HOME | End: 2021-01-25
Payer: MEDICAID

## 2021-01-25 PROCEDURE — 93798 PHYS/QHP OP CAR RHAB W/ECG: CPT

## 2021-01-27 ENCOUNTER — HOSPITAL ENCOUNTER (OUTPATIENT)
Dept: CARDIAC REHAB | Age: 61
Setting detail: THERAPIES SERIES
Discharge: HOME | End: 2021-01-27
Payer: MEDICAID

## 2021-01-27 PROCEDURE — 93798 PHYS/QHP OP CAR RHAB W/ECG: CPT

## 2021-01-28 ENCOUNTER — HOSPITAL ENCOUNTER (OUTPATIENT)
Age: 61
Discharge: HOME | End: 2021-01-28
Payer: MEDICAID

## 2021-01-28 ENCOUNTER — HOSPITAL ENCOUNTER (OUTPATIENT)
Dept: GENERAL RADIOLOGY | Age: 61
Discharge: HOME | End: 2021-01-28
Payer: MEDICAID

## 2021-01-28 ENCOUNTER — OFFICE VISIT (OUTPATIENT)
Dept: PULMONOLOGY | Age: 61
End: 2021-01-28
Payer: MEDICAID

## 2021-01-28 VITALS
HEIGHT: 69 IN | TEMPERATURE: 97.1 F | RESPIRATION RATE: 16 BRPM | BODY MASS INDEX: 26.98 KG/M2 | SYSTOLIC BLOOD PRESSURE: 112 MMHG | OXYGEN SATURATION: 96 % | HEART RATE: 88 BPM | DIASTOLIC BLOOD PRESSURE: 77 MMHG | WEIGHT: 182.2 LBS

## 2021-01-28 DIAGNOSIS — R06.02 SOB (SHORTNESS OF BREATH): ICD-10-CM

## 2021-01-28 DIAGNOSIS — J90 PLEURAL EFFUSION: ICD-10-CM

## 2021-01-28 DIAGNOSIS — F10.11 ALCOHOL ABUSE, IN REMISSION: ICD-10-CM

## 2021-01-28 DIAGNOSIS — J44.9 STAGE 2 MODERATE COPD BY GOLD CLASSIFICATION (HCC): Primary | ICD-10-CM

## 2021-01-28 DIAGNOSIS — Z72.0 TOBACCO ABUSE: ICD-10-CM

## 2021-01-28 DIAGNOSIS — J44.9 STAGE 2 MODERATE COPD BY GOLD CLASSIFICATION (HCC): ICD-10-CM

## 2021-01-28 PROCEDURE — G8427 DOCREV CUR MEDS BY ELIG CLIN: HCPCS | Performed by: INTERNAL MEDICINE

## 2021-01-28 PROCEDURE — 71046 X-RAY EXAM CHEST 2 VIEWS: CPT

## 2021-01-28 PROCEDURE — G8417 CALC BMI ABV UP PARAM F/U: HCPCS | Performed by: INTERNAL MEDICINE

## 2021-01-28 PROCEDURE — 1036F TOBACCO NON-USER: CPT | Performed by: INTERNAL MEDICINE

## 2021-01-28 PROCEDURE — G8926 SPIRO NO PERF OR DOC: HCPCS | Performed by: INTERNAL MEDICINE

## 2021-01-28 PROCEDURE — G8484 FLU IMMUNIZE NO ADMIN: HCPCS | Performed by: INTERNAL MEDICINE

## 2021-01-28 PROCEDURE — 3023F SPIROM DOC REV: CPT | Performed by: INTERNAL MEDICINE

## 2021-01-28 PROCEDURE — 3017F COLORECTAL CA SCREEN DOC REV: CPT | Performed by: INTERNAL MEDICINE

## 2021-01-28 PROCEDURE — 99204 OFFICE O/P NEW MOD 45 MIN: CPT | Performed by: INTERNAL MEDICINE

## 2021-01-28 RX ORDER — ALBUTEROL SULFATE 90 UG/1
AEROSOL, METERED RESPIRATORY (INHALATION)
Qty: 18 G | Refills: 3 | Status: SHIPPED | OUTPATIENT
Start: 2021-01-28 | End: 2021-08-24 | Stop reason: SDUPTHER

## 2021-01-28 RX ORDER — GABAPENTIN 300 MG/1
CAPSULE ORAL
Qty: 90 CAPSULE | Refills: 1 | OUTPATIENT
Start: 2021-01-28 | End: 2021-04-28

## 2021-01-28 NOTE — ASSESSMENT & PLAN NOTE
-FEV1 51%, reduced DLCO  -Increase Symbicort to scheduled twice daily, albuterol as needed  -Abstain from tobacco, has been quit four years

## 2021-01-28 NOTE — ASSESSMENT & PLAN NOTE
-No associated consolidations or masses, given his alcohol history and abnormal liver on CT this could represent hepatic hydrothorax.  -No appreciable signs of effusion on exam today, will repeat chest x-ray to see if it is improved with Lasix.  -If persist will need outpatient thoracentesis  -Liver ultrasound to evaluate for possible cirrhosis

## 2021-01-28 NOTE — ASSESSMENT & PLAN NOTE
-Based on age and smoking history he is eligible for LDCT.   He will be due at the end of 2021.  -Quit smoking 2017

## 2021-01-28 NOTE — LETTER
[facility] Pulmonology Sleep and Critical Care  [facility] [address]  Phone: [phone]  Fax: [phone]    Nir Gomez MD        January 28, 2021       Patient: Jad Menjivar   MR Number: [id number]   YOB: 1960   Date of Visit: 1/28/2021       Dear Dr. Caesar Cage: Thank you for the request for consultation for Jefferson Stephens to me for the evaluation of Shortness of breath. Below are the relevant portions of my assessment and plan of care. Problem List Items Addressed This Visit        Pulmonary Problems    Stage 2 moderate COPD by GOLD classification ([facility] [address] - Primary     -FEV1 51%, reduced DLCO  -Increase Symbicort to scheduled twice daily, albuterol as needed  -Abstain from tobacco, has been quit four years         Relevant Orders    XR CHEST (2 VW)    Pleural effusion     -No associated consolidations or masses, given his alcohol history and abnormal liver on CT this could represent hepatic hydrothorax.  -No appreciable signs of effusion on exam today, will repeat chest x-ray to see if it is improved with Lasix.  -If persist will need outpatient thoracentesis  -Liver ultrasound to evaluate for possible cirrhosis         Relevant Orders    XR CHEST (2 VW)    US LIVER       Other    Alcohol abuse, in remission    Relevant Orders    US LIVER    Tobacco abuse     -Based on age and smoking history he is eligible for LDCT. He will be due at the end of 2021.  -Quit smoking 2017                 If you have questions, please do not hesitate to call me. I look forward to following Fabricio Hoff along with you.     Sincerely,        Renaldo Rios MD    CC providers:  [facility], [facility] [place] [address] [address]  [address] [place]

## 2021-01-28 NOTE — PROGRESS NOTES
REASON FOR CONSULTATION:  Chief Complaint   Patient presents with    New Patient     ref by Dr. Chance Kan for dyspnea on exertion         Consult at request of Erin Garcia MD     PCP: Eirn Garcia MD    HISTORY OF PRESENT ILLNESS: Cora Yo is a 64y.o. year old male with a history of former 30+py smoker (quit 2017), and former alcohol abuse who presents for evaluation of shortness of breath. Patient states that he was in reasonable good state of health prior to multiple strokes last year. Since then he states that he has had new onset shortness of breath on exertion. He has a mild cough. Patient has been started on empiric Symbicort twice daily based on smoking history and symptomatology. In the interim he has had pulmonary function test that do demonstrate Gold stage II COPD airflow obstruction with FEV1 of 51%, and reduced DLCO. He also had a CT of his chest performed that demonstrated no right-sided, unilateral, gravity dependent pleural effusion. Since starting Symbicort patient is unsure if it has made a difference in her shortness of breath. He admits that he is only using as needed though.       Past Medical History:   Diagnosis Date    Alcohol abuse, in remission     2016 quit     Alcohol abuse, in remission     Arthritis     rt hip, hands    Asthma     Back pain     Cerebral artery occlusion with cerebral infarction ([address]     COPD (chronic obstructive pulmonary disease) (HCC)     beatter since quit smoking in 2017    CVA (cerebral infarction) 4/28/13    affected L side     Depression     GERD (gastroesophageal reflux disease)     Hyperlipidemia     Hypertension     Insomnia     MRSA carrier     very long time ago, more than one year    Paresthesia of left leg     chronic problem    Paresthesia of right leg     Psoriasis (a type of skin inflammation)     Sciatic nerve pain, left     Sciatic nerve pain, right     Smoker     1.5 ppd       Past Surgical History: Procedure Laterality Date    ABDOMEN SURGERY      APPENDECTOMY      ruptured     BACK SURGERY      maybe a fusion, L4 L5 herniated disc    CHOLECYSTECTOMY, LAPAROSCOPIC  2/13/2015    LAPAROSCOPIC CHOLECYSTECTOMY WITH CHOLANGIOGRAM, LYSIS OF    COLONOSCOPY  10/01/2018    NE COLONOSCOPY FLX DX W/COLLJ SPEC WHEN PFRMD N/A 10/1/2018    COLONOSCOPY performed by Sotero Marie MD at [address] [facility]  4/24/13    for ischemic bowel   Wayne Agustin         family history includes Arthritis in his mother; Cancer in his father; Heart Disease in his father and mother; No Known Problems in his maternal grandfather, maternal grandmother, paternal grandfather, and paternal grandmother. SOCIAL HISTORY:   reports that he quit smoking about 7 years ago. His smoking use included cigarettes. He started smoking about 38 years ago. He smoked 1.00 pack per day. He has never used smokeless tobacco.      ALLERGIES:  Patient has No Known Allergies. REVIEW OF SYSTEMS:  Constitutional: Negative for fever   HENT: Negative for sore throat  Eyes: Negative for redness   Respiratory: +dyspnea, cough  Cardiovascular: Negative for chest pain  Gastrointestinal: Negative for vomiting, diarrhea   Genitourinary: Negative for hematuria   Musculoskeletal: Negative for arthralgias   Skin: Negative for rash  Neurological: Negative for syncope  Hematological: Negative for adenopathy  Psychiatric/Behavorial: Negative for anxiety    Objective:   PHYSICAL EXAM:  Blood pressure 112/77, pulse 88, temperature 97.1 °F (36.2 °C), temperature source Temporal, resp. rate 16, height 5' 9\" (1.753 m), weight 182 lb 3.2 oz (82.6 kg), SpO2 96 %.'  Gen: No distress. Eyes: PERRL. No sclera icterus. No conjunctival injection. ENT: No discharge. Pharynx clear. External appearance of ears and nose normal.  Neck: Trachea midline. No obvious mass. Resp: No accessory muscle use. No crackles. No wheezes. No rhonchi. CV: Regular rate. Regular rhythm. No murmur or rub. No edema. GI: Non-tender. Non-distended. No hernia. Skin: Warm, dry, normal texture and turgor. No nodule on exposed extremities. Lymph: No cervical LAD. No supraclavicular LAD. M/S: No cyanosis. No clubbing. No joint deformity. Neuro: Moves all four extremities. Psych: Oriented x 3. No anxiety. Awake. Alert. Intact judgement and insight. Current Outpatient Medications   Medication Sig Dispense Refill    temazepam (RESTORIL) 15 MG capsule Take 1 capsule by mouth nightly as needed for Sleep for up to 60 days. 30 capsule 1    atorvastatin (LIPITOR) 80 MG tablet Take 80 mg by mouth nightly      DULoxetine (CYMBALTA) 60 MG extended release capsule Take 1 capsule by mouth daily 90 capsule 1    SYMBICORT 160-4.5 MCG/ACT AERO Inhale 2 puffs into the lungs 2 times daily 1 Inhaler 2    acetaminophen (TYLENOL) 500 MG tablet Take 500 mg by mouth every 6 hours as needed      ferrous sulfate (IRON 325) 325 (65 Fe) MG tablet Take 325 mg by mouth daily (with breakfast)      gabapentin (NEURONTIN) 600 MG tablet Take 1 tablet by mouth 3 times daily for 90 days.  90 tablet 3    metoprolol succinate (TOPROL XL) 25 MG extended release tablet TAKE ONE TABLET BY MOUTH DAILY 30 tablet 4    traZODone (DESYREL) 50 MG tablet TAKE ONE TABLET BY MOUTH ONCE NIGHTLY 90 tablet 0    cetirizine (ZYRTEC) 10 MG tablet TAKE ONE TABLET BY MOUTH DAILY 90 tablet 1    tiZANidine (ZANAFLEX) 4 MG tablet TAKE ONE TABLET BY MOUTH EVERY 8 HOURS AS NEEDED FOR MUSCLE PAIN AND SPASM 90 tablet 2    isosorbide mononitrate (IMDUR) 30 MG extended release tablet Take 30 mg by mouth daily      furosemide (LASIX) 20 MG tablet TAKE ONE TABLET BY MOUTH DAILY 30 tablet 5    lisinopril (PRINIVIL;ZESTRIL) 5 MG tablet Take 1 tablet by mouth daily 90 tablet 1    aspirin (ASPIRIN LOW DOSE) 81 MG chewable tablet CHEW ONE TABLET BY MOUTH DAILY 30 tablet 5 The systolic pulmonary artery pressure cannot be estimated due to insufficient   tricuspid regurgitation. The IVC size is normal.       Pulmonary function testing  Moderate airflow obstruction, FEV1 51%. DLCO reduced. Assessment/Plan:       Diagnosis Orders   1. Stage 2 moderate COPD by GOLD classification (HCC)  XR CHEST (2 VW)   2. Tobacco abuse     3. Pleural effusion  XR CHEST (2 VW)    US LIVER   4. Alcohol abuse, in remission  US LIVER         Problem List Items Addressed This Visit        Pulmonary Problems    Stage 2 moderate COPD by GOLD classification ([address] - Primary     -FEV1 51%, reduced DLCO  -Increase Symbicort to scheduled twice daily, albuterol as needed  -Abstain from tobacco, has been quit four years         Relevant Orders    XR CHEST (2 VW)    Pleural effusion     -No associated consolidations or masses, given his alcohol history and abnormal liver on CT this could represent hepatic hydrothorax.  -No appreciable signs of effusion on exam today, will repeat chest x-ray to see if it is improved with Lasix.  -If persist will need outpatient thoracentesis  -Liver ultrasound to evaluate for possible cirrhosis         Relevant Orders    XR CHEST (2 VW)    US LIVER       Other    Alcohol abuse, in remission    Relevant Orders    US LIVER    Tobacco abuse     -Based on age and smoking history he is eligible for LDCT. He will be due at the end of 2021.  -Quit smoking 2017                This note was transcribed using [id number] [employer]. Please disregard any translational errors.     Genesis Lundberg [address] [facility]

## 2021-01-29 ENCOUNTER — HOSPITAL ENCOUNTER (OUTPATIENT)
Dept: CARDIAC REHAB | Age: 61
Setting detail: THERAPIES SERIES
Discharge: HOME | End: 2021-01-29
Payer: MEDICAID

## 2021-01-29 PROCEDURE — 93798 PHYS/QHP OP CAR RHAB W/ECG: CPT

## 2021-02-01 ENCOUNTER — HOSPITAL ENCOUNTER (OUTPATIENT)
Dept: CARDIAC REHAB | Age: 61
Setting detail: THERAPIES SERIES
Discharge: HOME | End: 2021-02-01
Payer: MEDICAID

## 2021-02-01 PROCEDURE — 93798 PHYS/QHP OP CAR RHAB W/ECG: CPT

## 2021-02-03 ENCOUNTER — HOSPITAL ENCOUNTER (OUTPATIENT)
Dept: CARDIAC REHAB | Age: 61
Setting detail: THERAPIES SERIES
Discharge: HOME | End: 2021-02-03
Payer: MEDICAID

## 2021-02-03 PROCEDURE — 93798 PHYS/QHP OP CAR RHAB W/ECG: CPT

## 2021-02-04 ENCOUNTER — HOSPITAL ENCOUNTER (OUTPATIENT)
Dept: ULTRASOUND IMAGING | Age: 61
Discharge: HOME | End: 2021-02-04
Payer: MEDICAID

## 2021-02-04 DIAGNOSIS — F10.11 ALCOHOL ABUSE, IN REMISSION: ICD-10-CM

## 2021-02-04 DIAGNOSIS — J90 PLEURAL EFFUSION: ICD-10-CM

## 2021-02-04 PROCEDURE — 76705 ECHO EXAM OF ABDOMEN: CPT

## 2021-02-05 ENCOUNTER — HOSPITAL ENCOUNTER (OUTPATIENT)
Dept: CARDIAC REHAB | Age: 61
Setting detail: THERAPIES SERIES
Discharge: HOME | End: 2021-02-05
Payer: MEDICAID

## 2021-02-05 PROCEDURE — 93798 PHYS/QHP OP CAR RHAB W/ECG: CPT

## 2021-02-08 ENCOUNTER — HOSPITAL ENCOUNTER (OUTPATIENT)
Dept: CARDIAC REHAB | Age: 61
Setting detail: THERAPIES SERIES
Discharge: HOME | End: 2021-02-08
Payer: MEDICAID

## 2021-02-08 PROCEDURE — 93798 PHYS/QHP OP CAR RHAB W/ECG: CPT

## 2021-02-10 ENCOUNTER — HOSPITAL ENCOUNTER (OUTPATIENT)
Dept: CARDIAC REHAB | Age: 61
Setting detail: THERAPIES SERIES
Discharge: HOME | End: 2021-02-10
Payer: MEDICAID

## 2021-02-10 PROCEDURE — 93798 PHYS/QHP OP CAR RHAB W/ECG: CPT

## 2021-02-12 ENCOUNTER — HOSPITAL ENCOUNTER (OUTPATIENT)
Dept: CARDIAC REHAB | Age: 61
Setting detail: THERAPIES SERIES
Discharge: HOME | End: 2021-02-12
Payer: MEDICAID

## 2021-02-12 PROCEDURE — 93798 PHYS/QHP OP CAR RHAB W/ECG: CPT

## 2021-02-15 ENCOUNTER — APPOINTMENT (OUTPATIENT)
Dept: CARDIAC REHAB | Age: 61
End: 2021-02-15
Payer: MEDICAID

## 2021-02-17 ENCOUNTER — HOSPITAL ENCOUNTER (OUTPATIENT)
Dept: CARDIAC REHAB | Age: 61
Setting detail: THERAPIES SERIES
Discharge: HOME | End: 2021-02-17
Payer: MEDICAID

## 2021-02-17 ENCOUNTER — TELEPHONE (OUTPATIENT)
Dept: PULMONOLOGY | Age: 61
End: 2021-02-17

## 2021-02-17 PROCEDURE — 93798 PHYS/QHP OP CAR RHAB W/ECG: CPT

## 2021-02-19 ENCOUNTER — HOSPITAL ENCOUNTER (OUTPATIENT)
Dept: CARDIAC REHAB | Age: 61
Setting detail: THERAPIES SERIES
Discharge: HOME | End: 2021-02-19
Payer: MEDICAID

## 2021-02-19 PROCEDURE — 93798 PHYS/QHP OP CAR RHAB W/ECG: CPT

## 2021-02-22 ENCOUNTER — HOSPITAL ENCOUNTER (OUTPATIENT)
Dept: CARDIAC REHAB | Age: 61
Setting detail: THERAPIES SERIES
Discharge: HOME | End: 2021-02-22
Payer: MEDICAID

## 2021-02-22 PROCEDURE — 93798 PHYS/QHP OP CAR RHAB W/ECG: CPT

## 2021-02-23 ENCOUNTER — OFFICE VISIT (OUTPATIENT)
Dept: PULMONOLOGY | Age: 61
End: 2021-02-23
Payer: MEDICAID

## 2021-02-23 VITALS
BODY MASS INDEX: 26.96 KG/M2 | HEART RATE: 83 BPM | OXYGEN SATURATION: 97 % | HEIGHT: 69 IN | DIASTOLIC BLOOD PRESSURE: 78 MMHG | RESPIRATION RATE: 14 BRPM | WEIGHT: 182 LBS | SYSTOLIC BLOOD PRESSURE: 110 MMHG | TEMPERATURE: 96.9 F

## 2021-02-23 DIAGNOSIS — Z72.0 TOBACCO ABUSE: Primary | ICD-10-CM

## 2021-02-23 DIAGNOSIS — J90 PLEURAL EFFUSION: ICD-10-CM

## 2021-02-23 DIAGNOSIS — J44.9 STAGE 2 MODERATE COPD BY GOLD CLASSIFICATION (HCC): ICD-10-CM

## 2021-02-23 PROCEDURE — 99214 OFFICE O/P EST MOD 30 MIN: CPT | Performed by: INTERNAL MEDICINE

## 2021-02-23 PROCEDURE — G8417 CALC BMI ABV UP PARAM F/U: HCPCS | Performed by: INTERNAL MEDICINE

## 2021-02-23 PROCEDURE — 3023F SPIROM DOC REV: CPT | Performed by: INTERNAL MEDICINE

## 2021-02-23 PROCEDURE — G8926 SPIRO NO PERF OR DOC: HCPCS | Performed by: INTERNAL MEDICINE

## 2021-02-23 PROCEDURE — 1036F TOBACCO NON-USER: CPT | Performed by: INTERNAL MEDICINE

## 2021-02-23 PROCEDURE — G8427 DOCREV CUR MEDS BY ELIG CLIN: HCPCS | Performed by: INTERNAL MEDICINE

## 2021-02-23 PROCEDURE — 3017F COLORECTAL CA SCREEN DOC REV: CPT | Performed by: INTERNAL MEDICINE

## 2021-02-23 PROCEDURE — G8484 FLU IMMUNIZE NO ADMIN: HCPCS | Performed by: INTERNAL MEDICINE

## 2021-02-23 ASSESSMENT — COPD QUESTIONNAIRES
QUESTION7_SLEEPQUALITY: 1
QUESTION6_LEAVINGHOUSE: 1
QUESTION8_ENERGYLEVEL: 2
QUESTION2_CHESTPHLEGM: 2
CAT_TOTALSCORE: 13
QUESTION4_WALKINCLINE: 4

## 2021-02-23 NOTE — PROGRESS NOTES
REASON FOR CONSULTATION:  Chief Complaint   Patient presents with    COPD        Consult at request of Laurie Patel MD     PCP: Laurie Patel MD    HISTORY OF PRESENT ILLNESS: Sneha Guy is a 64y.o. year old male with a history of former 30+py smoker (quit 2017), and former alcohol abuse who presents for evaluation of shortness of breath. Gold stage II COPD airflow obstruction with FEV1 of 51%, and reduced DLCO. He is now taking Symbicort every day but not twice a day. He states his symptoms are somewhat improved but not back to baseline. Albuterol as needed. We reviewed proper inhaler technique and regimen. Pleural effusion-underwent surveillance chest x-ray since last visit that showed small but improving right-sided pleural effusion. He had a ultrasound of his liver that did not show cirrhosis.       Past Medical History:   Diagnosis Date    Alcohol abuse, in remission     2016 quit     Alcohol abuse, in remission     Arthritis     rt hip, hands    Asthma     Back pain     Cerebral artery occlusion with cerebral infarction ([address]     COPD (chronic obstructive pulmonary disease) (HCC)     beatter since quit smoking in 2017    CVA (cerebral infarction) 4/28/13    affected L side     Depression     GERD (gastroesophageal reflux disease)     Hyperlipidemia     Hypertension     Insomnia     MRSA carrier     very long time ago, more than one year    Paresthesia of left leg     chronic problem    Paresthesia of right leg     Psoriasis (a type of skin inflammation)     Sciatic nerve pain, left     Sciatic nerve pain, right     Smoker     1.5 ppd       Past Surgical History:   Procedure Laterality Date    ABDOMEN SURGERY      APPENDECTOMY      ruptured     BACK SURGERY      maybe a fusion, L4 L5 herniated disc    CHOLECYSTECTOMY, LAPAROSCOPIC  2/13/2015    LAPAROSCOPIC CHOLECYSTECTOMY WITH CHOLANGIOGRAM, LYSIS OF    COLONOSCOPY  10/01/2018    MA COLONOSCOPY FLX DX W/COLLJ [facility] [address] WHEN PFRMD N/A 10/1/2018    COLONOSCOPY performed by Anand Suarez MD at [address] [facility]  4/24/13    for ischemic bowel   [address]         family history includes Arthritis in his mother; Cancer in his father; Heart Disease in his father and mother; No Known Problems in his maternal grandfather, maternal grandmother, paternal grandfather, and paternal grandmother. SOCIAL HISTORY:   reports that he quit smoking about 7 years ago. His smoking use included cigarettes. He started smoking about 38 years ago. He smoked 1.00 pack per day. He has never used smokeless tobacco.      ALLERGIES:  Patient has No Known Allergies. REVIEW OF SYSTEMS:  Constitutional: Negative for fever   HENT: Negative for sore throat  Eyes: Negative for redness   Respiratory: +dyspnea, cough  Cardiovascular: Negative for chest pain  Gastrointestinal: Negative for vomiting, diarrhea   Genitourinary: Negative for hematuria   Musculoskeletal: Negative for arthralgias   Skin: Negative for rash  Neurological: Negative for syncope  Hematological: Negative for adenopathy  Psychiatric/Behavorial: Negative for anxiety    Objective:   PHYSICAL EXAM:  Blood pressure 110/78, pulse 83, temperature 96.9 °F (36.1 °C), temperature source Temporal, resp. rate 14, height 5' 9\" (1.753 m), weight 182 lb (82.6 kg), SpO2 97 %.'  Gen: No distress. Eyes: PERRL. No sclera icterus. No conjunctival injection. ENT: No discharge. Pharynx clear. External appearance of ears and nose normal.  Neck: Trachea midline. No obvious mass. Resp: No accessory muscle use. No crackles. No wheezes. No rhonchi. CV: Regular rate. Regular rhythm. No murmur or rub. No edema. GI: Non-tender. Non-distended. No hernia. Skin: Warm, dry, normal texture and turgor. No nodule on exposed extremities. Lymph: No cervical LAD. No supraclavicular LAD. M/S: No cyanosis. No clubbing. No joint deformity. Neuro: Moves all four extremities. Psych: Oriented x 3. No anxiety. Awake. Alert. Intact judgement and insight. Current Outpatient Medications   Medication Sig Dispense Refill    albuterol sulfate  (90 Base) MCG/ACT inhaler INHALE TWO PUFFS BY MOUTH EVERY 6 HOURS AS NEEDED FOR SHORTNESS OF BREATH 18 g 3    temazepam (RESTORIL) 15 MG capsule Take 1 capsule by mouth nightly as needed for Sleep for up to 60 days. 30 capsule 1    atorvastatin (LIPITOR) 80 MG tablet Take 80 mg by mouth nightly      DULoxetine (CYMBALTA) 60 MG extended release capsule Take 1 capsule by mouth daily 90 capsule 1    SYMBICORT 160-4.5 MCG/ACT AERO Inhale 2 puffs into the lungs 2 times daily 1 Inhaler 2    acetaminophen (TYLENOL) 500 MG tablet Take 500 mg by mouth every 6 hours as needed      ferrous sulfate (IRON 325) 325 (65 Fe) MG tablet Take 325 mg by mouth daily (with breakfast)      gabapentin (NEURONTIN) 600 MG tablet Take 1 tablet by mouth 3 times daily for 90 days.  90 tablet 3    metoprolol succinate (TOPROL XL) 25 MG extended release tablet TAKE ONE TABLET BY MOUTH DAILY 30 tablet 4    traZODone (DESYREL) 50 MG tablet TAKE ONE TABLET BY MOUTH ONCE NIGHTLY 90 tablet 0    cetirizine (ZYRTEC) 10 MG tablet TAKE ONE TABLET BY MOUTH DAILY 90 tablet 1    tiZANidine (ZANAFLEX) 4 MG tablet TAKE ONE TABLET BY MOUTH EVERY 8 HOURS AS NEEDED FOR MUSCLE PAIN AND SPASM 90 tablet 2    isosorbide mononitrate (IMDUR) 30 MG extended release tablet Take 30 mg by mouth daily      furosemide (LASIX) 20 MG tablet TAKE ONE TABLET BY MOUTH DAILY 30 tablet 5    lisinopril (PRINIVIL;ZESTRIL) 5 MG tablet Take 1 tablet by mouth daily 90 tablet 1    aspirin (ASPIRIN LOW DOSE) 81 MG chewable tablet CHEW ONE TABLET BY MOUTH DAILY 30 tablet 5    omeprazole (PRILOSEC) 20 MG delayed release capsule TAKE ONE CAPSULE BY MOUTH DAILY 30 capsule 1    clopidogrel (PLAVIX) 75 MG tablet TAKE ONE TABLET BY MOUTH DAILY 30 tablet 5    cyanocobalamin ([facility] VITAMIN B12) 1000 MCG

## 2021-02-23 NOTE — ASSESSMENT & PLAN NOTE
-FEV1 51%, reduced DLCO  -No taking Symbicort daily but has been taking twice daily as directed.   He will start doing this now  -Quit smoking 4 years ago

## 2021-02-24 ENCOUNTER — HOSPITAL ENCOUNTER (OUTPATIENT)
Dept: CARDIAC REHAB | Age: 61
Setting detail: THERAPIES SERIES
Discharge: HOME | End: 2021-02-24
Payer: MEDICAID

## 2021-02-24 PROCEDURE — 93798 PHYS/QHP OP CAR RHAB W/ECG: CPT

## 2021-02-26 ENCOUNTER — APPOINTMENT (OUTPATIENT)
Dept: CARDIAC REHAB | Age: 61
End: 2021-02-26
Payer: MEDICAID

## 2021-02-26 RX ORDER — OMEPRAZOLE 20 MG/1
CAPSULE, DELAYED RELEASE ORAL
Qty: 30 CAPSULE | Refills: 4 | Status: SHIPPED
Start: 2021-02-26 | End: 2021-05-26 | Stop reason: SINTOL

## 2021-02-26 RX ORDER — DULOXETIN HYDROCHLORIDE 30 MG/1
CAPSULE, DELAYED RELEASE ORAL
Qty: 90 CAPSULE | Refills: 4 | OUTPATIENT
Start: 2021-02-26

## 2021-03-01 ENCOUNTER — HOSPITAL ENCOUNTER (OUTPATIENT)
Dept: CARDIAC REHAB | Age: 61
Setting detail: THERAPIES SERIES
Discharge: HOME | End: 2021-03-01
Payer: MEDICAID

## 2021-03-01 PROCEDURE — 93798 PHYS/QHP OP CAR RHAB W/ECG: CPT

## 2021-03-03 ENCOUNTER — HOSPITAL ENCOUNTER (OUTPATIENT)
Dept: CARDIAC REHAB | Age: 61
Setting detail: THERAPIES SERIES
Discharge: HOME | End: 2021-03-03
Payer: MEDICAID

## 2021-03-03 PROCEDURE — 93798 PHYS/QHP OP CAR RHAB W/ECG: CPT

## 2021-03-04 ENCOUNTER — TELEPHONE (OUTPATIENT)
Dept: DERMATOLOGY | Age: 61
End: 2021-03-04

## 2021-03-04 NOTE — TELEPHONE ENCOUNTER
Patient called to confirm appt for 3/5. Appointment was cancelled by someone unfamiliar to Mell Grover - patient said he never called to cancel his appointment and would still like to come in for appointment tomorrow. There is only one 15 min slot open for tomorrow.  Please call patient to reschedule

## 2021-03-05 ENCOUNTER — PROCEDURE VISIT (OUTPATIENT)
Dept: DERMATOLOGY | Age: 61
End: 2021-03-05
Payer: MEDICAID

## 2021-03-05 ENCOUNTER — APPOINTMENT (OUTPATIENT)
Dept: CARDIAC REHAB | Age: 61
End: 2021-03-05
Payer: MEDICAID

## 2021-03-05 VITALS — TEMPERATURE: 97.6 F

## 2021-03-05 DIAGNOSIS — L72.11 PILAR CYST: Primary | ICD-10-CM

## 2021-03-05 DIAGNOSIS — R20.9 DISTURBANCE OF SKIN SENSATION: ICD-10-CM

## 2021-03-05 PROCEDURE — 11423 EXC H-F-NK-SP B9+MARG 2.1-3: CPT | Performed by: DERMATOLOGY

## 2021-03-05 PROCEDURE — 12031 INTMD RPR S/A/T/EXT 2.5 CM/<: CPT | Performed by: DERMATOLOGY

## 2021-03-05 NOTE — PROGRESS NOTES
[facility] access hospital Dermatology  Esa Higgins MD  [address] [facility]  1960    64 y.o. male     Date of Visit: 3/5/2021    Chief Complaint: cyst    History of Present Illness:    He presents today for excision of an enlarging painful cyst on the right parietal scalp. On Plavix. Does not smoke. Review of Systems:  Gen: Feels well, good sense of health. Past Medical History, Family History, Surgical History, Medications and Allergies reviewed.     Past Medical History:   Diagnosis Date    Alcohol abuse, in remission     2016 quit     Alcohol abuse, in remission     Arthritis     rt hip, hands    Asthma     Back pain     [address]bral artery occlusion with cerebral infarction [address] [address]     COPD (chronic obstructive pulmonary disease) (HCC)     beatter since quit smoking in 2017    CVA (cerebral infarction) 4/28/13    affected L side     Depression     GERD (gastroesophageal reflux disease)     Hyperlipidemia     Hypertension     Insomnia     MRSA carrier     very long time ago, more than one year    Paresthesia of left leg     chronic problem    Paresthesia of right leg     Psoriasis (a type of skin inflammation)     Sciatic nerve pain, left     Sciatic nerve pain, right     Smoker     1.5 ppd     Past Surgical History:   Procedure Laterality Date    ABDOMEN SURGERY      APPENDECTOMY      ruptured     BACK SURGERY      maybe a fusion, L4 L5 herniated disc    CHOLECYSTECTOMY, LAPAROSCOPIC  2/13/2015    LAPAROSCOPIC CHOLECYSTECTOMY WITH CHOLANGIOGRAM, LYSIS OF    COLONOSCOPY  10/01/2018    [place] COLONOSCOPY FLX DX W/COLLJ SPEC WHEN PFRMD N/A 10/1/2018    COLONOSCOPY performed by Macy Valero MD at [address] [place] [address]  4/24/13    for ischemic bowel    TONSILLECTOMY         No Known Allergies  Outpatient Medications Marked as Taking for the 3/5/21 encounter (Procedure visit) with Lee Coker MD   Medication Sig Dispense Refill  omeprazole (PRILOSEC) 20 MG delayed release capsule TAKE ONE CAPSULE BY MOUTH DAILY 30 capsule 4    albuterol sulfate  (90 Base) MCG/ACT inhaler INHALE TWO PUFFS BY MOUTH EVERY 6 HOURS AS NEEDED FOR SHORTNESS OF BREATH 18 g 3    temazepam (RESTORIL) 15 MG capsule Take 1 capsule by mouth nightly as needed for Sleep for up to 60 days. 30 capsule 1    atorvastatin (LIPITOR) 80 MG tablet Take 80 mg by mouth nightly      DULoxetine (CYMBALTA) 60 MG extended release capsule Take 1 capsule by mouth daily 90 capsule 1    SYMBICORT 160-4.5 MCG/ACT AERO Inhale 2 puffs into the lungs 2 times daily 1 Inhaler 2    acetaminophen (TYLENOL) 500 MG tablet Take 500 mg by mouth every 6 hours as needed      ferrous sulfate (IRON 325) 325 (65 Fe) MG tablet Take 325 mg by mouth daily (with breakfast)      gabapentin (NEURONTIN) 600 MG tablet Take 1 tablet by mouth 3 times daily for 90 days. 90 tablet 3    metoprolol succinate (TOPROL XL) 25 MG extended release tablet TAKE ONE TABLET BY MOUTH DAILY 30 tablet 4    traZODone (DESYREL) 50 MG tablet TAKE ONE TABLET BY MOUTH ONCE NIGHTLY 90 tablet 0    cetirizine (ZYRTEC) 10 MG tablet TAKE ONE TABLET BY MOUTH DAILY 90 tablet 1    tiZANidine (ZANAFLEX) 4 MG tablet TAKE ONE TABLET BY MOUTH EVERY 8 HOURS AS NEEDED FOR MUSCLE PAIN AND SPASM 90 tablet 2    isosorbide mononitrate (IMDUR) 30 MG extended release tablet Take 30 mg by mouth daily      furosemide (LASIX) 20 MG tablet TAKE ONE TABLET BY MOUTH DAILY 30 tablet 5    lisinopril (PRINIVIL;ZESTRIL) 5 MG tablet Take 1 tablet by mouth daily 90 tablet 1    aspirin (ASPIRIN LOW DOSE) 81 MG chewable tablet CHEW ONE TABLET BY MOUTH DAILY 30 tablet 5    clopidogrel (PLAVIX) 75 MG tablet TAKE ONE TABLET BY MOUTH DAILY 30 tablet 5    cyanocobalamin ([facility] VITAMIN B12) 1000 MCG tablet Take 1 tablet by mouth daily 30 tablet 2         Physical Examination       Well-appearing. 1.  Right parietal scalp with a 2.5 cm round skin colored nodule. Assessment and Plan     1. Enlarging painful pilar cyst on the right parietal scalp    The site to be treated was confirmed with the patient and the previous note. The patient was educated regarding potential risks of bleeding, discomfort, scar, and recurrence. A consent form was signed by the patient. The area was prepped and draped in the normal sterile fashion. Local anesthesia was obtained wth 1% lidocaine with epinephrine. An incision was performed overlying the cyst, the cyst was visualized and dissected from the surrounding tissue. The specimen was submitted to pathology in an appropriately labeled specimen container. Pinpoint hemostasis was obtained. The wound edges were undermined and approximated in a layered fashion with buried sutures of 5-0 vicryl and running sutures of 5-0 nylon. The final length of the wound measured 2 cm. Blood loss was minimal and there were no immediate complications. The wound was covered with petrolatum and a pressure bandage.      The patient is to return in 10 days for suture removal.          --Patel De La Fuente MD

## 2021-03-08 ENCOUNTER — HOSPITAL ENCOUNTER (OUTPATIENT)
Dept: CARDIAC REHAB | Age: 61
Setting detail: THERAPIES SERIES
End: 2021-03-08
Payer: MEDICAID

## 2021-03-08 ENCOUNTER — TELEPHONE (OUTPATIENT)
Dept: DERMATOLOGY | Age: 61
End: 2021-03-08

## 2021-03-08 NOTE — TELEPHONE ENCOUNTER
Pt calling states he was seen on Friday 3/5 had a cyst removed want to know if he can resume cardiac rehab or wait until he has sutures removed pls return call back @ [phone] so something can get faxed to cardiac rehab he is there now

## 2021-03-08 NOTE — TELEPHONE ENCOUNTER
Informed patient of Dr Felicia Stone response. Patient verbalized understanding. Patient asked me to send this in writing to his cardiac rehab. The fax number he gave me was [phone].

## 2021-03-09 LAB — DERMATOLOGY PATHOLOGY REPORT: NORMAL

## 2021-03-09 NOTE — TELEPHONE ENCOUNTER
Tried faxing the letter I wrote multiple lines but it kept saying that the line was busy. Called patient and asked for Cardiac Rehab's phone number ([phone]). Called Cardiac Rehab and spoke to Deedee and informed her that patient has been instructed by Dr Neva Crawley to hold off on rehab until 3/15/21. She verbalized understanding and said she would notify the nurses.

## 2021-03-10 ENCOUNTER — APPOINTMENT (OUTPATIENT)
Dept: CARDIAC REHAB | Age: 61
End: 2021-03-10
Payer: MEDICAID

## 2021-03-12 ENCOUNTER — APPOINTMENT (OUTPATIENT)
Dept: CARDIAC REHAB | Age: 61
End: 2021-03-12
Payer: MEDICAID

## 2021-03-15 ENCOUNTER — HOSPITAL ENCOUNTER (OUTPATIENT)
Dept: CARDIAC REHAB | Age: 61
Setting detail: THERAPIES SERIES
Discharge: HOME | End: 2021-03-15
Payer: MEDICAID

## 2021-03-15 PROCEDURE — 93798 PHYS/QHP OP CAR RHAB W/ECG: CPT

## 2021-03-15 RX ORDER — TIZANIDINE 4 MG/1
TABLET ORAL
Qty: 90 TABLET | Refills: 2 | Status: SHIPPED | OUTPATIENT
Start: 2021-03-15 | End: 2021-06-14

## 2021-03-16 ENCOUNTER — TELEPHONE (OUTPATIENT)
Dept: FAMILY MEDICINE CLINIC | Age: 61
End: 2021-03-16

## 2021-03-16 ENCOUNTER — OFFICE VISIT (OUTPATIENT)
Dept: FAMILY MEDICINE CLINIC | Age: 61
End: 2021-03-16
Payer: MEDICAID

## 2021-03-16 VITALS
BODY MASS INDEX: 27.58 KG/M2 | TEMPERATURE: 98 F | HEIGHT: 69 IN | HEART RATE: 78 BPM | DIASTOLIC BLOOD PRESSURE: 62 MMHG | SYSTOLIC BLOOD PRESSURE: 104 MMHG | WEIGHT: 186.2 LBS | OXYGEN SATURATION: 97 %

## 2021-03-16 DIAGNOSIS — F51.01 PRIMARY INSOMNIA: ICD-10-CM

## 2021-03-16 DIAGNOSIS — K55.029 ISCHEMIC NECROSIS OF SMALL BOWEL (HCC): ICD-10-CM

## 2021-03-16 DIAGNOSIS — R06.02 SOB (SHORTNESS OF BREATH): Primary | ICD-10-CM

## 2021-03-16 PROCEDURE — G8417 CALC BMI ABV UP PARAM F/U: HCPCS | Performed by: INTERNAL MEDICINE

## 2021-03-16 PROCEDURE — 99213 OFFICE O/P EST LOW 20 MIN: CPT | Performed by: INTERNAL MEDICINE

## 2021-03-16 PROCEDURE — 3017F COLORECTAL CA SCREEN DOC REV: CPT | Performed by: INTERNAL MEDICINE

## 2021-03-16 PROCEDURE — 1036F TOBACCO NON-USER: CPT | Performed by: INTERNAL MEDICINE

## 2021-03-16 PROCEDURE — G8427 DOCREV CUR MEDS BY ELIG CLIN: HCPCS | Performed by: INTERNAL MEDICINE

## 2021-03-16 PROCEDURE — G8484 FLU IMMUNIZE NO ADMIN: HCPCS | Performed by: INTERNAL MEDICINE

## 2021-03-16 RX ORDER — MELATONIN
2000 DAILY
Qty: 60 TABLET | Refills: 5 | Status: SHIPPED | OUTPATIENT
Start: 2021-03-16 | End: 2021-09-16

## 2021-03-16 RX ORDER — BACLOFEN 20 MG
1 TABLET ORAL DAILY
Qty: 30 TABLET | Refills: 3 | Status: SHIPPED | OUTPATIENT
Start: 2021-03-16 | End: 2021-07-15

## 2021-03-16 RX ORDER — TEMAZEPAM 15 MG/1
15 CAPSULE ORAL NIGHTLY PRN
Qty: 30 CAPSULE | Refills: 2 | Status: SHIPPED | OUTPATIENT
Start: 2021-03-19 | End: 2021-08-23

## 2021-03-16 SDOH — ECONOMIC STABILITY: FOOD INSECURITY: WITHIN THE PAST 12 MONTHS, YOU WORRIED THAT YOUR FOOD WOULD RUN OUT BEFORE YOU GOT MONEY TO BUY MORE.: NEVER TRUE

## 2021-03-16 NOTE — PROGRESS NOTES
SUBJECTIVE:  Devante Benites is a 63 y.o. male being evaluated for:    Chief Complaint   Patient presents with    Check-Up       HPI   Patient having ernesto horses in both his legs    Tizaidine is not helping    Sleeping good with temazepam     Shortness of breath is better. COPD seeing pulmonary on Symbicort improved no cirrhosis on exam.  Coronary artery disease and sees cardiology at [facility], catheterization with single-vessel disease not amenable to revascularization on medical therapy and doing cardiac rehab. He continues to slowly improve  No Known Allergies  Current Outpatient Medications   Medication Sig Dispense Refill    Magnesium Oxide 500 MG TABS Take 1 tablet by mouth daily 30 tablet 3    vitamin D3 (CHOLECALCIFEROL) 25 MCG (1000 UT) TABS tablet Take 2 tablets by mouth daily 60 tablet 5    temazepam (RESTORIL) 15 MG capsule Take 1 capsule by mouth nightly as needed for Sleep for up to 90 days. 30 capsule 2    tiZANidine (ZANAFLEX) 4 MG tablet TAKE ONE TABLET BY MOUTH EVERY 8 HOURS AS NEEDED FOR MUSCLE PAND AND SPASM 90 tablet 2    omeprazole (PRILOSEC) 20 MG delayed release capsule TAKE ONE CAPSULE BY MOUTH DAILY 30 capsule 4    albuterol sulfate  (90 Base) MCG/ACT inhaler INHALE TWO PUFFS BY MOUTH EVERY 6 HOURS AS NEEDED FOR SHORTNESS OF BREATH 18 g 3    atorvastatin (LIPITOR) 80 MG tablet Take 80 mg by mouth nightly      DULoxetine (CYMBALTA) 60 MG extended release capsule Take 1 capsule by mouth daily 90 capsule 1    SYMBICORT 160-4.5 MCG/ACT AERO Inhale 2 puffs into the lungs 2 times daily 1 Inhaler 2    acetaminophen (TYLENOL) 500 MG tablet Take 500 mg by mouth every 6 hours as needed      ferrous sulfate (IRON 325) 325 (65 Fe) MG tablet Take 325 mg by mouth daily (with breakfast)      gabapentin (NEURONTIN) 600 MG tablet Take 1 tablet by mouth 3 times daily for 90 days.  90 tablet 3    metoprolol succinate (TOPROL XL) 25 MG extended release tablet TAKE ONE TABLET BY MOUTH Not on file   Relationships    Social connections     Talks on phone: Not on file     Gets together: Not on file     Attends [employer] service: Not on file     Active member of club or organization: Not on file     Attends meetings of clubs or organizations: Not on file     Relationship status: Not on file    Intimate partner violence     Fear of current or ex partner: Not on file     Emotionally abused: Not on file     Physically abused: Not on file     Forced sexual activity: Not on file   Other Topics Concern    Not on file   Social History Narrative    Lives alone in [facility] in [place]. Reports childhood was good, parents  when he was a baby, but he maintained and good relationship with both of them and still communicates with them frequently. He has 3 brothers, 1 sister - has a good relationship with them     Dropped out of school 10th grade since he was doing poorly, struggled with math, and wanted to seek employment.        Past Medical History:   Diagnosis Date    Alcohol abuse, in remission     2016 quit     Alcohol abuse, in remission     Arthritis     rt hip, hands    Asthma     Back pain     [facility]erebral [facility] occlusion with [facility] infarction ([facility] [address]     COPD (chronic obstructive pulmonary disease) (HCC)     beatter since quit smoking in 2017    CVA (cerebral infarction) 4/28/13    affected L side     Depression     GERD (gastroesophageal reflux disease)     Hyperlipidemia     Hypertension     Insomnia     MRSA carrier     very long time ago, more than one year    Paresthesia of left leg     chronic problem    Paresthesia of right leg     Psoriasis (a type of skin inflammation)     Sciatic nerve pain, left     Sciatic nerve pain, right     Smoker     1.5 ppd     Past Surgical History:   Procedure Laterality Date    ABDOMEN SURGERY      APPENDECTOMY      ruptured     BACK SURGERY      maybe a fusion, L4 L5 herniated disc    CHOLECYSTECTOMY, Abdomen is soft. Tenderness: There is no abdominal tenderness. Comments: No HSM. Genitourinary:     Penis: No tenderness. Musculoskeletal:         General: No swelling. Lymphadenopathy:      Cervical: No cervical adenopathy. Skin:     General: Skin is warm and dry. Neurological:      General: No focal deficit present. Mental Status: He is alert. Motor: No abnormal muscle tone. Gait: Gait normal.      Deep Tendon Reflexes: Reflexes are normal and symmetric. Psychiatric:         Behavior: Behavior normal.         Thought Content: Thought content normal.         ASSESSMENT/PLAN:    Maritza Murillo was seen today for check-up. Diagnoses and all orders for this visit:    SOB (shortness of breath) better with nitrates inhalers and pulmonary rehab    Primary insomnia reviewed [facility]  -     temazepam (RESTORIL) 15 MG capsule; Take 1 capsule by mouth nightly as needed for Sleep for up to 90 days. Ischemic necrosis of small bowel ([address]  Comments:  staus post surgery and no problems since will clear     Charley horses also to try tonic water over-the-counter  -     Magnesium Oxide 500 MG TABS; Take 1 tablet by mouth daily  -     vitamin D3 (CHOLECALCIFEROL) 25 MCG (1000 UT) TABS tablet; Take 2 tablets by mouth daily        Orders Placed This Encounter   Medications    Magnesium Oxide 500 MG TABS     Sig: Take 1 tablet by mouth daily     Dispense:  30 tablet     Refill:  3    vitamin D3 (CHOLECALCIFEROL) 25 MCG (1000 UT) TABS tablet     Sig: Take 2 tablets by mouth daily     Dispense:  60 tablet     Refill:  5    temazepam (RESTORIL) 15 MG capsule     Sig: Take 1 capsule by mouth nightly as needed for Sleep for up to 90 days. Dispense:  30 capsule     Refill:  2        Return in about 3 months (around 6/16/2021), or if symptoms worsen or fail to improve. There are no Patient Instructions on file for this visit.

## 2021-03-16 NOTE — TELEPHONE ENCOUNTER
Merry with [address] calling for clarification on tizanidine 4 mg directions.       Please call, [phone]

## 2021-03-17 ENCOUNTER — HOSPITAL ENCOUNTER (OUTPATIENT)
Dept: CARDIAC REHAB | Age: 61
Setting detail: THERAPIES SERIES
Discharge: HOME | End: 2021-03-17
Payer: MEDICAID

## 2021-03-17 PROCEDURE — 93798 PHYS/QHP OP CAR RHAB W/ECG: CPT

## 2021-03-17 RX ORDER — TRAZODONE HYDROCHLORIDE 50 MG/1
TABLET ORAL
Qty: 90 TABLET | Refills: 0 | Status: SHIPPED | OUTPATIENT
Start: 2021-03-17 | End: 2021-06-14

## 2021-03-17 RX ORDER — LISINOPRIL 5 MG/1
TABLET ORAL
Qty: 90 TABLET | Refills: 0 | Status: SHIPPED | OUTPATIENT
Start: 2021-03-17 | End: 2021-06-11

## 2021-03-17 NOTE — TELEPHONE ENCOUNTER
Last OV: 3/16/2021 Spoke with patient who stated that [facility] has a new manager now and there are making her injection her insulin in the common bathroom and she does not feel like that is appropriate .  Patient also stated that she does not feel like they are testing her blood sugars and giving her insulin at the right time   Did leave message for [facility] to return call to the office

## 2021-03-19 ENCOUNTER — HOSPITAL ENCOUNTER (OUTPATIENT)
Dept: CARDIAC REHAB | Age: 61
Setting detail: THERAPIES SERIES
Discharge: HOME | End: 2021-03-19
Payer: MEDICAID

## 2021-03-19 PROCEDURE — 93798 PHYS/QHP OP CAR RHAB W/ECG: CPT

## 2021-03-22 ENCOUNTER — HOSPITAL ENCOUNTER (OUTPATIENT)
Dept: CARDIAC REHAB | Age: 61
Setting detail: THERAPIES SERIES
Discharge: HOME | End: 2021-03-22
Payer: MEDICAID

## 2021-03-22 PROCEDURE — 93798 PHYS/QHP OP CAR RHAB W/ECG: CPT

## 2021-03-24 ENCOUNTER — HOSPITAL ENCOUNTER (OUTPATIENT)
Dept: CARDIAC REHAB | Age: 61
Setting detail: THERAPIES SERIES
Discharge: HOME | End: 2021-03-24
Payer: MEDICAID

## 2021-03-24 PROCEDURE — 93798 PHYS/QHP OP CAR RHAB W/ECG: CPT

## 2021-03-26 ENCOUNTER — HOSPITAL ENCOUNTER (OUTPATIENT)
Dept: CARDIAC REHAB | Age: 61
Setting detail: THERAPIES SERIES
Discharge: HOME | End: 2021-03-26
Payer: MEDICAID

## 2021-03-26 PROCEDURE — 93798 PHYS/QHP OP CAR RHAB W/ECG: CPT

## 2021-03-26 ASSESSMENT — ENCOUNTER SYMPTOMS
CONSTIPATION: 0
SHORTNESS OF BREATH: 0
NAUSEA: 0
VOMITING: 0
ABDOMINAL PAIN: 0
WHEEZING: 0
DIARRHEA: 0
COUGH: 0

## 2021-03-29 ENCOUNTER — HOSPITAL ENCOUNTER (OUTPATIENT)
Dept: CARDIAC REHAB | Age: 61
Setting detail: THERAPIES SERIES
Discharge: HOME | End: 2021-03-29
Payer: MEDICAID

## 2021-03-29 PROCEDURE — 93798 PHYS/QHP OP CAR RHAB W/ECG: CPT

## 2021-03-31 ENCOUNTER — APPOINTMENT (OUTPATIENT)
Dept: CARDIAC REHAB | Age: 61
End: 2021-03-31
Payer: MEDICAID

## 2021-04-14 ENCOUNTER — HOSPITAL ENCOUNTER (OUTPATIENT)
Dept: CARDIAC REHAB | Age: 61
Setting detail: THERAPIES SERIES
Discharge: HOME | End: 2021-04-14
Payer: MEDICAID

## 2021-04-14 PROCEDURE — 93798 PHYS/QHP OP CAR RHAB W/ECG: CPT

## 2021-04-16 ENCOUNTER — HOSPITAL ENCOUNTER (OUTPATIENT)
Dept: CARDIAC REHAB | Age: 61
Setting detail: THERAPIES SERIES
Discharge: HOME | End: 2021-04-16
Payer: MEDICAID

## 2021-04-16 PROCEDURE — 93798 PHYS/QHP OP CAR RHAB W/ECG: CPT

## 2021-04-19 ENCOUNTER — HOSPITAL ENCOUNTER (OUTPATIENT)
Dept: CARDIAC REHAB | Age: 61
Setting detail: THERAPIES SERIES
Discharge: HOME | End: 2021-04-19
Payer: MEDICAID

## 2021-04-19 PROCEDURE — 93798 PHYS/QHP OP CAR RHAB W/ECG: CPT

## 2021-04-21 ENCOUNTER — HOSPITAL ENCOUNTER (OUTPATIENT)
Dept: CARDIAC REHAB | Age: 61
Setting detail: THERAPIES SERIES
Discharge: HOME | End: 2021-04-21
Payer: MEDICAID

## 2021-04-21 PROCEDURE — 93798 PHYS/QHP OP CAR RHAB W/ECG: CPT

## 2021-04-23 ENCOUNTER — TELEPHONE (OUTPATIENT)
Dept: DERMATOLOGY | Age: 61
End: 2021-04-23

## 2021-04-23 ENCOUNTER — HOSPITAL ENCOUNTER (OUTPATIENT)
Dept: CARDIAC REHAB | Age: 61
Setting detail: THERAPIES SERIES
Discharge: HOME | End: 2021-04-23
Payer: MEDICAID

## 2021-04-23 PROCEDURE — 93798 PHYS/QHP OP CAR RHAB W/ECG: CPT

## 2021-04-23 NOTE — TELEPHONE ENCOUNTER
Dr Juan Bolivar patient  Pt lv c/b #[phone]  Pt stated  Will like to speak with dr Jaya Lorenzo regarding he's having breakouts when he uses deodorant. Pt wanted to know is there anything refererable/natural  He can use w/o breaking out because when he does use it he gets big knots under his arm.   Please c/b to discuss

## 2021-04-23 NOTE — TELEPHONE ENCOUNTER
Patient states that he is not currently broken out because he has not been using any deoderant due to the blisters/rash it gives him. He declines an office visit at this time. He asked if it would be okay to try a natural deodorant and I advised him that would be okay. If he is still have issue after trying a natural deodorant not containing aluminum he will call us back to schedule an appointment.

## 2021-04-26 ENCOUNTER — HOSPITAL ENCOUNTER (OUTPATIENT)
Dept: CARDIAC REHAB | Age: 61
Setting detail: THERAPIES SERIES
Discharge: HOME | End: 2021-04-26
Payer: MEDICAID

## 2021-04-26 PROCEDURE — 93798 PHYS/QHP OP CAR RHAB W/ECG: CPT

## 2021-04-28 ENCOUNTER — HOSPITAL ENCOUNTER (OUTPATIENT)
Dept: CARDIAC REHAB | Age: 61
Setting detail: THERAPIES SERIES
Discharge: HOME | End: 2021-04-28
Payer: MEDICAID

## 2021-04-28 PROCEDURE — 93798 PHYS/QHP OP CAR RHAB W/ECG: CPT

## 2021-04-29 RX ORDER — ASPIRIN 81 MG/1
TABLET, CHEWABLE ORAL
Qty: 30 TABLET | Refills: 3 | Status: SHIPPED | OUTPATIENT
Start: 2021-04-29 | End: 2021-08-24

## 2021-04-30 ENCOUNTER — HOSPITAL ENCOUNTER (OUTPATIENT)
Dept: CARDIAC REHAB | Age: 61
Setting detail: THERAPIES SERIES
End: 2021-04-30
Payer: MEDICAID

## 2021-05-05 NOTE — TELEPHONE ENCOUNTER
Allergy symptoms started yesterday with itching throat, hives.  Hives have resolved, throat feels tight her pt  Took inhaler, benadryl without relief Please advise.

## 2021-05-10 RX ORDER — GABAPENTIN 600 MG/1
600 TABLET ORAL 3 TIMES DAILY
Qty: 90 TABLET | Refills: 2 | Status: SHIPPED | OUTPATIENT
Start: 2021-05-10 | End: 2021-08-09

## 2021-05-26 RX ORDER — PANTOPRAZOLE SODIUM 20 MG/1
20 TABLET, DELAYED RELEASE ORAL DAILY
Qty: 30 TABLET | Refills: 3 | Status: SHIPPED | OUTPATIENT
Start: 2021-05-26 | End: 2021-09-30

## 2021-05-26 NOTE — TELEPHONE ENCOUNTER
Insurance did send a note questioning Plavix and Prilosec.   We should probably discontinue his omeprazole and put him on Protonix if okay with him 20 mg daily

## 2021-05-26 NOTE — TELEPHONE ENCOUNTER
Spoke with pt he will go ahead with the Protonix.  Please call it in to [facility]

## 2021-05-27 DIAGNOSIS — R60.0 BILATERAL LEG EDEMA: ICD-10-CM

## 2021-05-27 RX ORDER — FUROSEMIDE 20 MG/1
TABLET ORAL
Qty: 30 TABLET | Refills: 0 | Status: SHIPPED | OUTPATIENT
Start: 2021-05-27 | End: 2021-07-02

## 2021-05-27 NOTE — TELEPHONE ENCOUNTER
Pt advised and verbalized understanding. Pt said he is in [facility] right now and he will get lab drawn as soon as he came back from there.

## 2021-06-11 RX ORDER — LISINOPRIL 5 MG/1
TABLET ORAL
Qty: 90 TABLET | Refills: 0 | Status: SHIPPED | OUTPATIENT
Start: 2021-06-11 | End: 2021-09-08

## 2021-06-14 DIAGNOSIS — H69.81 EUSTACHIAN TUBE DYSFUNCTION, RIGHT: ICD-10-CM

## 2021-06-14 RX ORDER — TRAZODONE HYDROCHLORIDE 50 MG/1
TABLET ORAL
Qty: 90 TABLET | Refills: 0 | Status: SHIPPED | OUTPATIENT
Start: 2021-06-14 | End: 2021-09-09

## 2021-06-14 RX ORDER — TIZANIDINE 4 MG/1
TABLET ORAL
Qty: 90 TABLET | Refills: 1 | Status: SHIPPED | OUTPATIENT
Start: 2021-06-14 | End: 2021-09-15

## 2021-06-14 RX ORDER — CETIRIZINE HYDROCHLORIDE 10 MG/1
TABLET ORAL
Qty: 90 TABLET | Refills: 1 | Status: SHIPPED | OUTPATIENT
Start: 2021-06-14 | End: 2021-12-30

## 2021-06-21 DIAGNOSIS — I63.311 CEREBRAL INFARCTION DUE TO THROMBOSIS OF RIGHT MIDDLE CEREBRAL ARTERY (HCC): ICD-10-CM

## 2021-06-21 DIAGNOSIS — I10 ESSENTIAL HYPERTENSION: ICD-10-CM

## 2021-06-21 DIAGNOSIS — R06.09 DYSPNEA ON EXERTION: ICD-10-CM

## 2021-06-21 LAB
A/G RATIO: 1.8 (ref 1.1–2.2)
ALBUMIN SERPL-MCNC: 4 G/DL (ref 3.4–5)
ALP BLD-CCNC: 110 U/L (ref 40–129)
ALT SERPL-CCNC: 22 U/L (ref 10–40)
ANION GAP SERPL CALCULATED.3IONS-SCNC: 11 MMOL/L (ref 3–16)
AST SERPL-CCNC: 19 U/L (ref 15–37)
BASOPHILS ABSOLUTE: 0.1 K/UL (ref 0–0.2)
BASOPHILS RELATIVE PERCENT: 0.7 %
BILIRUB SERPL-MCNC: 0.3 MG/DL (ref 0–1)
BUN BLDV-MCNC: 11 MG/DL (ref 7–20)
CALCIUM SERPL-MCNC: 9 MG/DL (ref 8.3–10.6)
CHLORIDE BLD-SCNC: 109 MMOL/L (ref 99–110)
CHOLESTEROL, TOTAL: 84 MG/DL (ref 0–199)
CO2: 24 MMOL/L (ref 21–32)
CREAT SERPL-MCNC: 1 MG/DL (ref 0.9–1.3)
EOSINOPHILS ABSOLUTE: 0.2 K/UL (ref 0–0.6)
EOSINOPHILS RELATIVE PERCENT: 3 %
GFR AFRICAN AMERICAN: >60
GFR NON-AFRICAN AMERICAN: >60
GLOBULIN: 2.2 G/DL
GLUCOSE BLD-MCNC: 76 MG/DL (ref 70–99)
HCT VFR BLD CALC: 38.2 % (ref 40.5–52.5)
HDLC SERPL-MCNC: 37 MG/DL (ref 40–60)
HEMOGLOBIN: 12.6 G/DL (ref 13.5–17.5)
LDL CHOLESTEROL CALCULATED: 12 MG/DL
LYMPHOCYTES ABSOLUTE: 2.5 K/UL (ref 1–5.1)
LYMPHOCYTES RELATIVE PERCENT: 31.4 %
MCH RBC QN AUTO: 29.9 PG (ref 26–34)
MCHC RBC AUTO-ENTMCNC: 33.1 G/DL (ref 31–36)
MCV RBC AUTO: 90.4 FL (ref 80–100)
MONOCYTES ABSOLUTE: 0.7 K/UL (ref 0–1.3)
MONOCYTES RELATIVE PERCENT: 9.4 %
NEUTROPHILS ABSOLUTE: 4.4 K/UL (ref 1.7–7.7)
NEUTROPHILS RELATIVE PERCENT: 55.5 %
PDW BLD-RTO: 14.8 % (ref 12.4–15.4)
PLATELET # BLD: 306 K/UL (ref 135–450)
PMV BLD AUTO: 8.9 FL (ref 5–10.5)
POTASSIUM SERPL-SCNC: 4.6 MMOL/L (ref 3.5–5.1)
RBC # BLD: 4.23 M/UL (ref 4.2–5.9)
SODIUM BLD-SCNC: 144 MMOL/L (ref 136–145)
TOTAL PROTEIN: 6.2 G/DL (ref 6.4–8.2)
TRIGL SERPL-MCNC: 177 MG/DL (ref 0–150)
VLDLC SERPL CALC-MCNC: 35 MG/DL
WBC # BLD: 7.9 K/UL (ref 4–11)

## 2021-07-07 DIAGNOSIS — F32.9 REACTIVE DEPRESSION: ICD-10-CM

## 2021-07-07 RX ORDER — DULOXETIN HYDROCHLORIDE 60 MG/1
CAPSULE, DELAYED RELEASE ORAL
Qty: 90 CAPSULE | Refills: 1 | Status: SHIPPED | OUTPATIENT
Start: 2021-07-07 | End: 2022-01-05 | Stop reason: SDUPTHER

## 2021-07-12 RX ORDER — BUDESONIDE AND FORMOTEROL FUMARATE DIHYDRATE 160; 4.5 UG/1; UG/1
AEROSOL RESPIRATORY (INHALATION)
Qty: 10.2 G | Refills: 5 | Status: SHIPPED | OUTPATIENT
Start: 2021-07-12 | End: 2022-03-07

## 2021-07-15 RX ORDER — BACLOFEN 20 MG
TABLET ORAL
Qty: 30 TABLET | Refills: 2 | Status: SHIPPED | OUTPATIENT
Start: 2021-07-15 | End: 2021-10-18

## 2021-08-09 RX ORDER — GABAPENTIN 600 MG/1
TABLET ORAL
Qty: 90 TABLET | Refills: 1 | Status: SHIPPED | OUTPATIENT
Start: 2021-08-09 | End: 2021-10-21 | Stop reason: SDUPTHER

## 2021-08-21 DIAGNOSIS — F51.01 PRIMARY INSOMNIA: ICD-10-CM

## 2021-08-23 RX ORDER — TEMAZEPAM 15 MG/1
15 CAPSULE ORAL NIGHTLY PRN
Qty: 30 CAPSULE | Refills: 0 | Status: SHIPPED | OUTPATIENT
Start: 2021-08-23 | End: 2022-10-11 | Stop reason: SDUPTHER

## 2021-08-24 ENCOUNTER — OFFICE VISIT (OUTPATIENT)
Dept: PULMONOLOGY | Age: 61
End: 2021-08-24
Payer: MEDICAID

## 2021-08-24 VITALS
WEIGHT: 198.2 LBS | BODY MASS INDEX: 29.36 KG/M2 | OXYGEN SATURATION: 96 % | DIASTOLIC BLOOD PRESSURE: 70 MMHG | TEMPERATURE: 98.6 F | RESPIRATION RATE: 14 BRPM | SYSTOLIC BLOOD PRESSURE: 120 MMHG | HEIGHT: 69 IN | HEART RATE: 88 BPM

## 2021-08-24 DIAGNOSIS — Z87.891 PERSONAL HISTORY OF TOBACCO USE: ICD-10-CM

## 2021-08-24 DIAGNOSIS — Z72.0 TOBACCO ABUSE: Primary | ICD-10-CM

## 2021-08-24 DIAGNOSIS — J44.9 STAGE 2 MODERATE COPD BY GOLD CLASSIFICATION (HCC): ICD-10-CM

## 2021-08-24 DIAGNOSIS — R06.02 SOB (SHORTNESS OF BREATH): ICD-10-CM

## 2021-08-24 DIAGNOSIS — J90 PLEURAL EFFUSION: ICD-10-CM

## 2021-08-24 PROCEDURE — 1036F TOBACCO NON-USER: CPT | Performed by: INTERNAL MEDICINE

## 2021-08-24 PROCEDURE — G8427 DOCREV CUR MEDS BY ELIG CLIN: HCPCS | Performed by: INTERNAL MEDICINE

## 2021-08-24 PROCEDURE — G8417 CALC BMI ABV UP PARAM F/U: HCPCS | Performed by: INTERNAL MEDICINE

## 2021-08-24 PROCEDURE — G8926 SPIRO NO PERF OR DOC: HCPCS | Performed by: INTERNAL MEDICINE

## 2021-08-24 PROCEDURE — 99214 OFFICE O/P EST MOD 30 MIN: CPT | Performed by: INTERNAL MEDICINE

## 2021-08-24 PROCEDURE — 3023F SPIROM DOC REV: CPT | Performed by: INTERNAL MEDICINE

## 2021-08-24 PROCEDURE — 3017F COLORECTAL CA SCREEN DOC REV: CPT | Performed by: INTERNAL MEDICINE

## 2021-08-24 PROCEDURE — G0296 VISIT TO DETERM LDCT ELIG: HCPCS | Performed by: INTERNAL MEDICINE

## 2021-08-24 RX ORDER — ASPIRIN 81 MG/1
TABLET, CHEWABLE ORAL
Qty: 30 TABLET | Refills: 1 | Status: SHIPPED | OUTPATIENT
Start: 2021-08-24 | End: 2021-10-27

## 2021-08-24 RX ORDER — ALBUTEROL SULFATE 90 UG/1
2 AEROSOL, METERED RESPIRATORY (INHALATION) 4 TIMES DAILY
Qty: 18 G | Refills: 11 | Status: SHIPPED | OUTPATIENT
Start: 2021-08-24

## 2021-08-24 ASSESSMENT — ENCOUNTER SYMPTOMS
COUGH: 1
GASTROINTESTINAL NEGATIVE: 1

## 2021-08-24 NOTE — PROGRESS NOTES
M/S: No cyanosis. No clubbing. Neuro:  no focal neurologic deficit. Moves all extremities  Psych: Awake and alert, Oriented x 3. Judgement and insight appropriate. Mood stable. An electronic signature was used to authenticate this note. --Hussain Hernandez MD   Low Dose CT (LDCT) Lung Screening criteria met   Age 50-69   Pack year smoking >30   Still smoking or less than 15 year since quit   No sign or symptoms of lung cancer   > 11 months since last LDCT     Risks and benefits of lung cancer screening with LDCT scans discussed:    Significance of positive screen - False-positive LDCT results often occur. 95% of all positive results do not lead to a diagnosis of cancer. Usually further imaging can resolve most false-positive results; however, some patients may require invasive procedures. Over diagnosis risk - 10% to 12% of screen-detected lung cancer cases are over diagnosed--that is, the cancer would not have been detected in the patient's lifetime without the screening. Need for follow up screens annually to continue lung cancer screening effectiveness     Risks associated with radiation from annual LDCT- Radiation exposure is about the same as for a mammogram, which is about 1/3 of the annual background radiation exposure from everyday life. Starting screening at age 54 is not likely to increase cancer risk from radiation exposure. Patients with comorbidities resulting in life expectancy of < 10 years, or that would preclude treatment of an abnormality identified on CT, should not be screened due to lack of benefit.     To obtain maximal benefit from this screening, smoking cessation and long-term abstinence from smoking is critical

## 2021-09-08 RX ORDER — LISINOPRIL 5 MG/1
TABLET ORAL
Qty: 90 TABLET | Refills: 0 | Status: SHIPPED | OUTPATIENT
Start: 2021-09-08 | End: 2021-12-15

## 2021-09-09 RX ORDER — TRAZODONE HYDROCHLORIDE 50 MG/1
TABLET ORAL
Qty: 90 TABLET | Refills: 0 | Status: SHIPPED | OUTPATIENT
Start: 2021-09-09 | End: 2022-01-05 | Stop reason: SDUPTHER

## 2021-09-15 RX ORDER — TIZANIDINE 4 MG/1
TABLET ORAL
Qty: 90 TABLET | Refills: 1 | Status: SHIPPED | OUTPATIENT
Start: 2021-09-15 | End: 2022-01-05 | Stop reason: SDUPTHER

## 2021-09-16 DIAGNOSIS — I10 ESSENTIAL HYPERTENSION: ICD-10-CM

## 2021-09-16 RX ORDER — METOPROLOL SUCCINATE 25 MG/1
TABLET, EXTENDED RELEASE ORAL
Qty: 30 TABLET | Refills: 3 | Status: SHIPPED | OUTPATIENT
Start: 2021-09-16 | End: 2022-01-11

## 2021-09-27 ENCOUNTER — TELEPHONE (OUTPATIENT)
Dept: FAMILY MEDICINE CLINIC | Age: 61
End: 2021-09-27

## 2021-09-27 NOTE — TELEPHONE ENCOUNTER
Pt has a cold and a lot of chest congestion he is coughing up a lot of yellow phlem , he wants to know what he could get for cough and chest congestion   He is using a inhaler and it helps a little  [facility]

## 2021-09-30 RX ORDER — PANTOPRAZOLE SODIUM 20 MG/1
TABLET, DELAYED RELEASE ORAL
Qty: 30 TABLET | Refills: 3 | Status: SHIPPED | OUTPATIENT
Start: 2021-09-30 | End: 2022-02-02 | Stop reason: SDUPTHER

## 2021-10-04 ENCOUNTER — OFFICE VISIT (OUTPATIENT)
Dept: FAMILY MEDICINE CLINIC | Age: 61
End: 2021-10-04
Payer: MEDICAID

## 2021-10-04 VITALS
WEIGHT: 189 LBS | OXYGEN SATURATION: 98 % | TEMPERATURE: 97.9 F | HEIGHT: 69 IN | SYSTOLIC BLOOD PRESSURE: 110 MMHG | BODY MASS INDEX: 27.99 KG/M2 | HEART RATE: 70 BPM | DIASTOLIC BLOOD PRESSURE: 70 MMHG

## 2021-10-04 DIAGNOSIS — J40 BRONCHITIS: Primary | ICD-10-CM

## 2021-10-04 PROCEDURE — G8427 DOCREV CUR MEDS BY ELIG CLIN: HCPCS | Performed by: INTERNAL MEDICINE

## 2021-10-04 PROCEDURE — 99213 OFFICE O/P EST LOW 20 MIN: CPT | Performed by: INTERNAL MEDICINE

## 2021-10-04 PROCEDURE — 1036F TOBACCO NON-USER: CPT | Performed by: INTERNAL MEDICINE

## 2021-10-04 PROCEDURE — 3017F COLORECTAL CA SCREEN DOC REV: CPT | Performed by: INTERNAL MEDICINE

## 2021-10-04 PROCEDURE — G8484 FLU IMMUNIZE NO ADMIN: HCPCS | Performed by: INTERNAL MEDICINE

## 2021-10-04 PROCEDURE — G8417 CALC BMI ABV UP PARAM F/U: HCPCS | Performed by: INTERNAL MEDICINE

## 2021-10-04 RX ORDER — AZITHROMYCIN 250 MG/1
250 TABLET, FILM COATED ORAL SEE ADMIN INSTRUCTIONS
Qty: 6 TABLET | Refills: 0 | Status: SHIPPED | OUTPATIENT
Start: 2021-10-04 | End: 2021-10-09

## 2021-10-04 RX ORDER — METHYLPREDNISOLONE 4 MG/1
TABLET ORAL
Qty: 1 KIT | Refills: 0 | Status: SHIPPED | OUTPATIENT
Start: 2021-10-04 | End: 2021-10-10

## 2021-10-04 NOTE — PROGRESS NOTES
SUBJECTIVE:  Emma Vincent is a 63 y.o. male being evaluated for:    Chief Complaint   Patient presents with    Congestion     cough, runny nose, headache, upper mouth is sore        HPI Crazy cold for the last week  Mouth is sore  Headache runny nose which was clear  Coughing with lots of congestion  COughing green stuff  Mildly sob  Using his inhaler  NO chest pain  NO fevers or chills  No one else has been sick  NOt achy all over   No further swelling on the furosemide   No Known Allergies  Current Outpatient Medications   Medication Sig Dispense Refill    methylPREDNISolone (MEDROL DOSEPACK) 4 MG tablet Take by mouth as directed 1 kit 0    pantoprazole (PROTONIX) 20 MG tablet TAKE ONE TABLET BY MOUTH DAILY 30 tablet 3    metoprolol succinate (TOPROL XL) 25 MG extended release tablet TAKE ONE TABLET BY MOUTH DAILY 30 tablet 3    vitamin D (CHOLECALCIFEROL) 25 MCG (1000 UT) TABS tablet TAKE TWO TABLETS BY MOUTH DAILY 60 tablet 3    tiZANidine (ZANAFLEX) 4 MG tablet TAKE ONE TABLET BY MOUTH EVERY 8 HOURS AS NEEDED FOR MUSCLE PAIN AND SPASM 90 tablet 1    traZODone (DESYREL) 50 MG tablet TAKE ONE TABLET BY MOUTH ONCE NIGHTLY 90 tablet 0    lisinopril (PRINIVIL;ZESTRIL) 5 MG tablet TAKE ONE TABLET BY MOUTH DAILY 90 tablet 0    aspirin (ASPIRIN LOW DOSE) 81 MG chewable tablet CHEW ONE TABLET BY MOUTH DAILY 30 tablet 1    albuterol sulfate  (90 Base) MCG/ACT inhaler Inhale 2 puffs into the lungs 4 times daily 18 g 11    gabapentin (NEURONTIN) 600 MG tablet TAKE ONE TABLET BY MOUTH THREE TIMES A DAY 90 tablet 1    Magnesium Oxide 500 MG TABS TAKE ONE TABLET BY MOUTH DAILY 30 tablet 2    SYMBICORT 160-4.5 MCG/ACT AERO INHALE TWO PUFFS BY MOUTH TWICE A DAY 10.2 g 5    DULoxetine (CYMBALTA) 60 MG extended release capsule TAKE ONE CAPSULE BY MOUTH DAILY 90 capsule 1    furosemide (LASIX) 20 MG tablet TAKE ONE TABLET BY MOUTH DAILY 30 tablet 3    cetirizine (ZYRTEC) 10 MG tablet TAKE ONE TABLET BY MOUTH DAILY 90 tablet 1    atorvastatin (LIPITOR) 80 MG tablet Take 80 mg by mouth nightly      acetaminophen (TYLENOL) 500 MG tablet Take 500 mg by mouth every 6 hours as needed      ferrous sulfate (IRON 325) 325 (65 Fe) MG tablet Take 325 mg by mouth daily (with breakfast)      isosorbide mononitrate (IMDUR) 30 MG extended release tablet Take 30 mg by mouth daily      clopidogrel (PLAVIX) 75 MG tablet TAKE ONE TABLET BY MOUTH DAILY 30 tablet 5    cyanocobalamin ([employer] VITAMIN B12) 1000 MCG tablet Take 1 tablet by mouth daily 30 tablet 2     No current facility-administered medications for this visit. Social History     Socioeconomic History    Marital status:      Spouse name: Not on file    Number of children: Not on file    Years of education: 8    Highest education level: Not on file   Occupational History    Occupation: unemployed     Comment: disability from back. 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   Tobacco Use    Smoking status: Former Smoker     Packs/day: 1.00     Years: 15.00     Pack years: 15.00     Types: Cigarettes     Start date:      Quit date: 2013     Years since quittin.3    Smokeless tobacco: Never Used    Tobacco comment: off and on  was up to 2 packs a day   Vaping Use    Vaping Use: Never used   Substance and Sexual Activity    Alcohol use: No     Alcohol/week: 0.0 standard drinks     Comment: in remission    Drug use: Not Currently     Types: Marijuana     Comment: 2-3x/month    Sexual activity: Yes     Partners: Female   Other Topics Concern    Not on file   Social History Narrative    Lives alone in [facility] in [facility] [address] [place]. Reports childhood was good, parents  when he was a baby, but he maintained and good relationship with both of them and still communicates with them frequently.      He has 3 brothers, 1 sister - has a good relationship with them     Dropped out of school 10th grade since he was doing poorly, struggled with math, and wanted to seek employment. Social Determinants of Health     Financial Resource Strain: Low Risk     Difficulty of Paying Living Expenses: Not hard at all   Food Insecurity: No Food Insecurity    Worried About Running Out of Food in the Last Year: Never true    Luc of Food in the Last Year: Never true   Transportation Needs: No Transportation Needs    Lack of Transportation (Medical): No    Lack of Transportation (Non-Medical):  No   Physical Activity:     Days of Exercise per Week:     Minutes of Exercise per Session:    Stress:     Feeling of Stress :    Social Connections:     Frequency of Communication with Friends and Family:     Frequency of Social Gatherings with Friends and Family:     Attends [employer] Services:     Active Member of Clubs or Organizations:     Attends Club or Organization Meetings:     Marital Status:    Intimate Partner Violence:     Fear of Current or Ex-Partner:     Emotionally Abused:     Physically Abused:     Sexually Abused:       Past Medical History:   Diagnosis Date    Alcohol abuse, in remission     2016 quit     Alcohol abuse, in remission     Arthritis     rt hip, hands    Asthma     Back pain     [facility]erebral [facility] occlusion [facility] [address]     [facility]     beatter since quit smoking in 2017    CVA (cerebral infarction) 4/28/13    affected L side     Depression     GERD (gastroesophageal reflux disease)     Hyperlipidemia     Hypertension     Insomnia     MRSA carrier     very long time ago, more than one year    Paresthesia of left leg     chronic problem    Paresthesia of right leg     Psoriasis (a type of skin inflammation)     Sciatic nerve pain, left     Sciatic nerve pain, right     Smoker     1.5 ppd     Past Surgical History:   Procedure Laterality Date    ABDOMEN SURGERY      APPENDECTOMY      ruptured     BACK SURGERY      maybe a fusion, L4 present. Chest:      Chest wall: No tenderness. Abdominal:      General: There is no distension. Palpations: Abdomen is soft. Tenderness: There is no abdominal tenderness. Comments: No HSM. Genitourinary:     Penis: No tenderness. Musculoskeletal:         General: No swelling. Cervical back: Neck supple. No rigidity. Lymphadenopathy:      Cervical: No cervical adenopathy. Skin:     General: Skin is warm and dry. Neurological:      General: No focal deficit present. Mental Status: He is alert. Motor: No abnormal muscle tone. Gait: Gait normal.      Deep Tendon Reflexes: Reflexes are normal and symmetric. ASSESSMENT/PLAN:    Lonnie Ogden was seen today for congestion. Diagnoses and all orders for this visit:    Bronchitis  -     azithromycin (ZITHROMAX) 250 MG tablet; Take 1 tablet by mouth See Admin Instructions for 5 days 500mg on day 1 followed by 250mg on days 2 - 5  -     methylPREDNISolone (MEDROL DOSEPACK) 4 MG tablet; Take by mouth as directed        Orders Placed This Encounter   Medications    azithromycin (ZITHROMAX) 250 MG tablet     Sig: Take 1 tablet by mouth See Admin Instructions for 5 days 500mg on day 1 followed by 250mg on days 2 - 5     Dispense:  6 tablet     Refill:  0    methylPREDNISolone (MEDROL DOSEPACK) 4 MG tablet     Sig: Take by mouth as directed     Dispense:  1 kit     Refill:  0        Return if symptoms worsen or fail to improve. There are no Patient Instructions on file for this visit.

## 2021-10-10 ASSESSMENT — ENCOUNTER SYMPTOMS
SHORTNESS OF BREATH: 1
SINUS PRESSURE: 0
RHINORRHEA: 1
VOMITING: 0
COUGH: 1
CHEST TIGHTNESS: 1
ABDOMINAL PAIN: 0
NAUSEA: 0
DIARRHEA: 0

## 2021-10-18 RX ORDER — BACLOFEN 20 MG
TABLET ORAL
Qty: 30 TABLET | Refills: 2 | Status: SHIPPED | OUTPATIENT
Start: 2021-10-18 | End: 2022-01-13

## 2021-10-21 ENCOUNTER — TELEPHONE (OUTPATIENT)
Dept: FAMILY MEDICINE CLINIC | Age: 61
End: 2021-10-21

## 2021-10-21 RX ORDER — GABAPENTIN 600 MG/1
600 TABLET ORAL 3 TIMES DAILY
Qty: 90 TABLET | Refills: 1 | Status: SHIPPED | OUTPATIENT
Start: 2021-10-21 | End: 2021-10-28

## 2021-10-26 DIAGNOSIS — R60.0 BILATERAL LEG EDEMA: ICD-10-CM

## 2021-10-26 RX ORDER — FUROSEMIDE 20 MG/1
TABLET ORAL
Qty: 30 TABLET | Refills: 3 | Status: SHIPPED | OUTPATIENT
Start: 2021-10-26 | End: 2022-02-25

## 2021-10-27 RX ORDER — ASPIRIN 81 MG/1
TABLET, CHEWABLE ORAL
Qty: 30 TABLET | Refills: 5 | Status: SHIPPED | OUTPATIENT
Start: 2021-10-27 | End: 2022-04-29

## 2021-10-29 RX ORDER — GABAPENTIN 600 MG/1
600 TABLET ORAL 3 TIMES DAILY
Qty: 90 TABLET | Refills: 2 | Status: SHIPPED | OUTPATIENT
Start: 2021-10-29 | End: 2021-11-03 | Stop reason: SDUPTHER

## 2021-11-02 NOTE — TELEPHONE ENCOUNTER
----- Message from Simón Dodge sent at 11/2/2021 11:08 AM EDT -----  Subject: Refill Request    QUESTIONS  Name of Medication? gabapentin (NEURONTIN) 600 MG tablet  Patient-reported dosage and instructions? 600 mg three times a day  How many days do you have left? 0  Preferred Pharmacy? [facility] [id number]  Pharmacy phone number (if available)? [phone]  ---------------------------------------------------------------------------  --------------  CALL BACK INFO  What is the best way for the office to contact you? OK to leave message on   voicemail  Preferred Call Back Phone Number?  [phone]

## 2021-11-03 RX ORDER — GABAPENTIN 600 MG/1
600 TABLET ORAL 3 TIMES DAILY
Qty: 90 TABLET | Refills: 0 | OUTPATIENT
Start: 2021-11-03 | End: 2021-12-03

## 2021-11-03 NOTE — TELEPHONE ENCOUNTER
RX of Gabapentin escribed on 10/29 transmission failed,   I called the [address] in [address] talked to Javon Garcia and gave her a verbal order for the Gabapentin one TID #90 no refills.

## 2021-11-14 DIAGNOSIS — F51.01 PRIMARY INSOMNIA: Primary | ICD-10-CM

## 2021-11-15 RX ORDER — TEMAZEPAM 15 MG/1
15 CAPSULE ORAL NIGHTLY PRN
Qty: 30 CAPSULE | Refills: 0 | Status: SHIPPED | OUTPATIENT
Start: 2021-11-15 | End: 2021-12-15

## 2021-12-01 ENCOUNTER — HOSPITAL ENCOUNTER (OUTPATIENT)
Dept: CT IMAGING | Age: 61
Discharge: HOME | End: 2021-12-01
Payer: MEDICAID

## 2021-12-01 DIAGNOSIS — Z87.891 PERSONAL HISTORY OF TOBACCO USE: ICD-10-CM

## 2021-12-01 PROCEDURE — 71271 CT THORAX LUNG CANCER SCR C-: CPT

## 2021-12-03 RX ORDER — GABAPENTIN 600 MG/1
TABLET ORAL
Qty: 90 TABLET | Refills: 2 | Status: SHIPPED | OUTPATIENT
Start: 2021-12-03 | End: 2022-03-03 | Stop reason: SDUPTHER

## 2021-12-20 ENCOUNTER — TELEPHONE (OUTPATIENT)
Dept: CASE MANAGEMENT | Age: 61
End: 2021-12-20

## 2021-12-20 NOTE — TELEPHONE ENCOUNTER
CT Lung Cancer Screening completed on 12/1/2021, ordering provider, Dr Mady Steven reviewed radiology results with recommendations & office notified patient of results with recommendations. Result letter mailed to patient. Smoking history reviewed.

## 2021-12-30 DIAGNOSIS — G89.29 CHRONIC BILATERAL LOW BACK PAIN WITHOUT SCIATICA: ICD-10-CM

## 2021-12-30 DIAGNOSIS — M54.50 CHRONIC BILATERAL LOW BACK PAIN WITHOUT SCIATICA: ICD-10-CM

## 2021-12-30 DIAGNOSIS — H69.81 EUSTACHIAN TUBE DYSFUNCTION, RIGHT: ICD-10-CM

## 2021-12-30 RX ORDER — CETIRIZINE HYDROCHLORIDE 10 MG/1
TABLET ORAL
Qty: 30 TABLET | Refills: 1 | Status: SHIPPED | OUTPATIENT
Start: 2021-12-30 | End: 2022-03-03 | Stop reason: SDUPTHER

## 2021-12-30 RX ORDER — IBUPROFEN 800 MG/1
TABLET ORAL
Qty: 90 TABLET | Refills: 0 | Status: SHIPPED | OUTPATIENT
Start: 2021-12-30

## 2021-12-30 NOTE — TELEPHONE ENCOUNTER
Patient is requesting a refill of their prescription.     Requested Prescriptions     Pending Prescriptions Disp Refills     MG tablet [Pharmacy Med Name: IBUPROFEN 800 MG TABLET] 90 tablet      Sig: TAKE ONE TABLET BY MOUTH THREE TIMES A DAY WITH MEALS        Recent Visits  Date Type Provider Dept   10/04/21 Office Visit Cherylene Smalls, MD [facility]   03/16/21 Office Visit [facility] [place] [facility]   01/12/21 Office Visit [facility] [place] [facility]   01/04/21 Office Visit Cherylene Smalls, [facility] [place] [facility]   12/09/20 Office Visit [facility]ylene Smalls[facility] [place] [facility]   08/28/20 Office Visit Ayaka House MD [facility]   Showing recent visits within past 540 days with a meds authorizing provider and meeting all other requirements  Future Appointments  Date Type Provider Dept   01/25/22 Appointment Cherylene Smalls, MD [facility]   Showing future appointments within next 150 days with a meds authorizing provider and meeting all other requirements     Visit date not found

## 2022-01-05 DIAGNOSIS — F32.9 REACTIVE DEPRESSION: ICD-10-CM

## 2022-01-05 DIAGNOSIS — R07.9 CHEST PAIN, UNSPECIFIED TYPE: ICD-10-CM

## 2022-01-05 RX ORDER — ISOSORBIDE MONONITRATE 30 MG/1
30 TABLET, EXTENDED RELEASE ORAL DAILY
Qty: 90 TABLET | Refills: 1 | Status: SHIPPED | OUTPATIENT
Start: 2022-01-05 | End: 2022-07-05

## 2022-01-05 RX ORDER — TRAZODONE HYDROCHLORIDE 50 MG/1
50 TABLET ORAL NIGHTLY
Qty: 90 TABLET | Refills: 0 | Status: SHIPPED | OUTPATIENT
Start: 2022-01-05 | End: 2022-04-11

## 2022-01-05 RX ORDER — DULOXETIN HYDROCHLORIDE 60 MG/1
60 CAPSULE, DELAYED RELEASE ORAL DAILY
Qty: 90 CAPSULE | Refills: 1 | Status: SHIPPED | OUTPATIENT
Start: 2022-01-05 | End: 2022-07-07

## 2022-01-05 RX ORDER — TIZANIDINE 4 MG/1
TABLET ORAL
Qty: 90 TABLET | Refills: 2 | Status: SHIPPED
Start: 2022-01-05 | End: 2022-01-25 | Stop reason: DRUGHIGH

## 2022-01-05 NOTE — TELEPHONE ENCOUNTER
Pt is needing a refill on     isosorbide mononitrate (IMDUR) 30 MG extended release tablet     DULoxetine (CYMBALTA) 60 MG extended release capsule     Terry Barillas

## 2022-01-11 DIAGNOSIS — I10 ESSENTIAL HYPERTENSION: ICD-10-CM

## 2022-01-11 RX ORDER — METOPROLOL SUCCINATE 25 MG/1
TABLET, EXTENDED RELEASE ORAL
Qty: 30 TABLET | Refills: 3 | Status: SHIPPED | OUTPATIENT
Start: 2022-01-11 | End: 2022-05-16

## 2022-01-13 DIAGNOSIS — F51.01 PRIMARY INSOMNIA: ICD-10-CM

## 2022-01-13 RX ORDER — BACLOFEN 20 MG
TABLET ORAL
Qty: 30 TABLET | Refills: 0 | Status: SHIPPED | OUTPATIENT
Start: 2022-01-13 | End: 2022-02-14

## 2022-01-17 ENCOUNTER — TELEPHONE (OUTPATIENT)
Dept: FAMILY MEDICINE CLINIC | Age: 62
End: 2022-01-17

## 2022-01-17 RX ORDER — TEMAZEPAM 15 MG/1
15 CAPSULE ORAL NIGHTLY PRN
Qty: 30 CAPSULE | Refills: 0 | Status: SHIPPED | OUTPATIENT
Start: 2022-01-17 | End: 2022-02-07

## 2022-01-17 NOTE — TELEPHONE ENCOUNTER
----- Message from [address] sent at 1/15/2022 11:51 AM EST -----  Subject: Message to Provider    QUESTIONS  Information for Provider? Patient called in refills on 1/13 Temazepam and   Magnesium medication he is completely out and the Pharmacy said they do   not have the scripts  ---------------------------------------------------------------------------  --------------  [address]  What is the best way for the office to contact you? OK to leave message on   voicemail  Preferred Call Back Phone Number? [phone]  ---------------------------------------------------------------------------  --------------  SCRIPT ANSWERS  Relationship to Patient?  Self

## 2022-01-25 ENCOUNTER — HOSPITAL ENCOUNTER (OUTPATIENT)
Age: 62
Discharge: HOME | End: 2022-01-25
Payer: MEDICAID

## 2022-01-25 ENCOUNTER — HOSPITAL ENCOUNTER (OUTPATIENT)
Dept: GENERAL RADIOLOGY | Age: 62
Discharge: HOME | End: 2022-01-25
Payer: MEDICAID

## 2022-01-25 ENCOUNTER — TELEPHONE (OUTPATIENT)
Dept: FAMILY MEDICINE CLINIC | Age: 62
End: 2022-01-25

## 2022-01-25 ENCOUNTER — OFFICE VISIT (OUTPATIENT)
Dept: FAMILY MEDICINE CLINIC | Age: 62
End: 2022-01-25
Payer: MEDICAID

## 2022-01-25 VITALS
HEART RATE: 76 BPM | DIASTOLIC BLOOD PRESSURE: 76 MMHG | RESPIRATION RATE: 16 BRPM | TEMPERATURE: 97.6 F | OXYGEN SATURATION: 98 % | WEIGHT: 195.2 LBS | BODY MASS INDEX: 28.83 KG/M2 | SYSTOLIC BLOOD PRESSURE: 108 MMHG

## 2022-01-25 DIAGNOSIS — J44.9 STAGE 2 MODERATE COPD BY GOLD CLASSIFICATION (HCC): ICD-10-CM

## 2022-01-25 DIAGNOSIS — M54.2 NECK PAIN: ICD-10-CM

## 2022-01-25 DIAGNOSIS — Z12.5 PROSTATE CANCER SCREENING: ICD-10-CM

## 2022-01-25 DIAGNOSIS — M54.32 BILATERAL SCIATICA: ICD-10-CM

## 2022-01-25 DIAGNOSIS — I63.311 CEREBRAL INFARCTION DUE TO THROMBOSIS OF RIGHT MIDDLE CEREBRAL ARTERY (HCC): ICD-10-CM

## 2022-01-25 DIAGNOSIS — M50.90 CERVICAL DISC DISEASE: Primary | ICD-10-CM

## 2022-01-25 DIAGNOSIS — F51.01 PRIMARY INSOMNIA: ICD-10-CM

## 2022-01-25 DIAGNOSIS — I10 ESSENTIAL HYPERTENSION: ICD-10-CM

## 2022-01-25 DIAGNOSIS — M54.31 BILATERAL SCIATICA: ICD-10-CM

## 2022-01-25 PROCEDURE — 72040 X-RAY EXAM NECK SPINE 2-3 VW: CPT

## 2022-01-25 PROCEDURE — G8427 DOCREV CUR MEDS BY ELIG CLIN: HCPCS | Performed by: INTERNAL MEDICINE

## 2022-01-25 PROCEDURE — 1036F TOBACCO NON-USER: CPT | Performed by: INTERNAL MEDICINE

## 2022-01-25 PROCEDURE — 3017F COLORECTAL CA SCREEN DOC REV: CPT | Performed by: INTERNAL MEDICINE

## 2022-01-25 PROCEDURE — G8417 CALC BMI ABV UP PARAM F/U: HCPCS | Performed by: INTERNAL MEDICINE

## 2022-01-25 PROCEDURE — 72100 X-RAY EXAM L-S SPINE 2/3 VWS: CPT

## 2022-01-25 PROCEDURE — G8484 FLU IMMUNIZE NO ADMIN: HCPCS | Performed by: INTERNAL MEDICINE

## 2022-01-25 PROCEDURE — 99214 OFFICE O/P EST MOD 30 MIN: CPT | Performed by: INTERNAL MEDICINE

## 2022-01-25 PROCEDURE — 3023F SPIROM DOC REV: CPT | Performed by: INTERNAL MEDICINE

## 2022-01-25 RX ORDER — PREDNISONE 20 MG/1
TABLET ORAL
Qty: 11 TABLET | Refills: 0 | Status: SHIPPED | OUTPATIENT
Start: 2022-01-25 | End: 2022-05-23

## 2022-01-25 RX ORDER — TIZANIDINE 2 MG/1
2 TABLET ORAL 3 TIMES DAILY PRN
Qty: 30 TABLET | Refills: 2 | Status: SHIPPED
Start: 2022-01-25 | End: 2022-01-25 | Stop reason: DRUGHIGH

## 2022-01-25 RX ORDER — TIZANIDINE 4 MG/1
4 TABLET ORAL 3 TIMES DAILY PRN
Qty: 30 TABLET | Refills: 2 | Status: SHIPPED | OUTPATIENT
Start: 2022-01-25 | End: 2022-04-04

## 2022-01-25 ASSESSMENT — PATIENT HEALTH QUESTIONNAIRE - PHQ9
SUM OF ALL RESPONSES TO PHQ9 QUESTIONS 1 & 2: 0
1. LITTLE INTEREST OR PLEASURE IN DOING THINGS: 0
SUM OF ALL RESPONSES TO PHQ QUESTIONS 1-9: 0
2. FEELING DOWN, DEPRESSED OR HOPELESS: 0
SUM OF ALL RESPONSES TO PHQ QUESTIONS 1-9: 0

## 2022-01-25 NOTE — PROGRESS NOTES
SUBJECTIVE:  Rosa Coker is a 63 y.o. male being evaluated for:    Chief Complaint   Patient presents with    Insomnia     3 month follow up for med refill on Temazepam.     Hypertension    Hyperlipidemia    Depression       HPI   For last couple of weeks  Posterior headache located in upper spine and hurts in back of head  Turns his head a certain way and pain is bad  Neurologic symptoms not present no dizziness  Pain in low back and has had for the last several months  Shoots down both legs  His sciatic nerve  is worse in our cold weather  NO trouble with urination Has done physical therapy last year and did not help Has done it a few times without relief  Toes are numb and leg cramps persist   No falls or injuries   No Known Allergies  Current Outpatient Medications   Medication Sig Dispense Refill    predniSONE (DELTASONE) 20 MG tablet 2 pills daily for 3 days, 1 pills daily for 3 days, 1/2 daily for 3 days 11 tablet 0    temazepam (RESTORIL) 15 MG capsule Take 1 capsule by mouth nightly as needed for Sleep for up to 30 days.  30 capsule 0    Magnesium Oxide 500 MG TABS TAKE ONE TABLET BY MOUTH DAILY 30 tablet 0    metoprolol succinate (TOPROL XL) 25 MG extended release tablet TAKE ONE TABLET BY MOUTH DAILY 30 tablet 3    traZODone (DESYREL) 50 MG tablet Take 1 tablet by mouth nightly 90 tablet 0    DULoxetine (CYMBALTA) 60 MG extended release capsule Take 1 capsule by mouth daily 90 capsule 1    isosorbide mononitrate (IMDUR) 30 MG extended release tablet Take 1 tablet by mouth daily 90 tablet 1    cetirizine (ZYRTEC) 10 MG tablet TAKE ONE TABLET BY MOUTH DAILY 30 tablet 1     MG tablet TAKE ONE TABLET BY MOUTH THREE TIMES A DAY WITH MEALS 90 tablet 0    lisinopril (PRINIVIL;ZESTRIL) 5 MG tablet TAKE ONE TABLET BY MOUTH DAILY 30 tablet 5    gabapentin (NEURONTIN) 600 MG tablet TAKE ONE TABLET BY MOUTH THREE TIMES A DAY 90 tablet 2    aspirin (ASPIRIN LOW DOSE) 81 MG chewable tablet CHEW ONE TABLET BY MOUTH DAILY 30 tablet 5    furosemide (LASIX) 20 MG tablet TAKE ONE TABLET BY MOUTH DAILY 30 tablet 3    vitamin D (CHOLECALCIFEROL) 25 MCG (1000 UT) TABS tablet TAKE TWO TABLETS BY MOUTH DAILY 60 tablet 3    albuterol sulfate  (90 Base) MCG/ACT inhaler Inhale 2 puffs into the lungs 4 times daily 18 g 11    SYMBICORT 160-4.5 MCG/ACT AERO INHALE TWO PUFFS BY MOUTH TWICE A DAY 10.2 g 5    atorvastatin (LIPITOR) 80 MG tablet Take 80 mg by mouth nightly      acetaminophen (TYLENOL) 500 MG tablet Take 500 mg by mouth every 6 hours as needed      ferrous sulfate (IRON 325) 325 (65 Fe) MG tablet Take 325 mg by mouth daily (with breakfast)      clopidogrel (PLAVIX) 75 MG tablet TAKE ONE TABLET BY MOUTH DAILY 30 tablet 5    cyanocobalamin ([employer] VITAMIN B12) 1000 MCG tablet Take 1 tablet by mouth daily 30 tablet 2    pantoprazole (PROTONIX) 20 MG tablet TAKE ONE TABLET BY MOUTH DAILY 30 tablet 3    tiZANidine (ZANAFLEX) 4 MG tablet Take 1 tablet by mouth 3 times daily as needed (back and neck spasm) 30 tablet 2     No current facility-administered medications for this visit. Social History     Socioeconomic History    Marital status:      Spouse name: Not on file    Number of children: Not on file    Years of education: 8    Highest education level: Not on file   Occupational History    Occupation: unemployed     Comment: disability from back.  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   Tobacco Use    Smoking status: Former Smoker     Packs/day: 1.00     Years: 15.00     Pack years: 15.00     Types: Cigarettes     Start date:      Quit date: 2013     Years since quittin.6    Smokeless tobacco: Never Used    Tobacco comment: off and on  was up to 2 packs a day   Vaping Use    Vaping Use: Never used   Substance and Sexual Activity    Alcohol use: No     Alcohol/week: 0.0 standard drinks     Comment: in remission    Drug use: Yes     Types: Marijuana Fredick Copping) Comment: 2-3x/month    Sexual activity: Yes     Partners: Female   Other Topics Concern    Not on file   Social History Narrative    Lives alone in [facility] in [place]. Reports childhood was good, parents  when he was a baby, but he maintained and good relationship with both of them and still communicates with them frequently. He has 3 brothers, 1 sister - has a good relationship with them     Dropped out of school 10th grade since he was doing poorly, struggled with math, and wanted to seek employment. Social Determinants of Health     Financial Resource Strain: Low Risk     Difficulty of Paying Living Expenses: Not hard at all   Food Insecurity: No Food Insecurity    Worried About Running Out of Food in the Last Year: Never true    Luc of Food in the Last Year: Never true   Transportation Needs: No Transportation Needs    Lack of Transportation (Medical): No    Lack of Transportation (Non-Medical):  No   Physical Activity:     Days of Exercise per Week: Not on file    Minutes of Exercise per Session: Not on file   Stress:     Feeling of Stress : Not on file   Social Connections:     Frequency of Communication with Friends and Family: Not on file    Frequency of Social Gatherings with Friends and Family: Not on file    Attends Hindu Services: Not on file    Active Member of [address] [place] [address] or Organizations: Not on file    Attends Club or Organization Meetings: Not on file    Marital Status: Not on file   Intimate Partner Violence:     Fear of Current or Ex-Partner: Not on file    Emotionally Abused: Not on file    Physically Abused: Not on file    Sexually Abused: Not on file   Housing Stability:     Unable to Pay for Housing in the Last Year: Not on file    Number of [place] in the Last Year: Not on file    Unstable Housing in the Last Year: Not on file      Past Medical History:   Diagnosis Date    Alcohol abuse, in remission     2016 quit     Alcohol abuse, in remission     Arthritis     rt hip, hands    Asthma     Back pain     Cerebral artery occlusion with cerebral infarction (HCC)     COPD (chronic obstructive pulmonary disease) (HCC)     beatter since quit smoking in 2017    CVA (cerebral infarction) 4/28/13    affected L side     Depression     GERD (gastroesophageal reflux disease)     Hyperlipidemia     Hypertension     Insomnia     MRSA carrier     very long time ago, more than one year    Paresthesia of left leg     chronic problem    Paresthesia of right leg     Psoriasis (a type of skin inflammation)     Sciatic nerve pain, left     Sciatic nerve pain, right     Smoker     1.5 ppd     Past Surgical History:   Procedure Laterality Date    ABDOMEN SURGERY      APPENDECTOMY      ruptured     BACK SURGERY      maybe a fusion, L4 L5 herniated disc    CHOLECYSTECTOMY, LAPAROSCOPIC  2/13/2015    LAPAROSCOPIC CHOLECYSTECTOMY WITH CHOLANGIOGRAM, LYSIS OF    COLONOSCOPY  10/01/2018    OR COLONOSCOPY FLX DX W/COLLJ SPEC WHEN PFRMD N/A 10/1/2018    COLONOSCOPY performed by Mark Mckenna MD at [address] [place] [address]  4/24/13    for ischemic bowel    TONSILLECTOMY         Review of Systems   Constitutional: Positive for activity change. Negative for unexpected weight change. HENT: Negative for nosebleeds. Eyes: Negative for visual disturbance. Respiratory: Negative for cough and shortness of breath. Cardiovascular: Negative for chest pain, palpitations and leg swelling. Gastrointestinal: Negative for abdominal pain, blood in stool, diarrhea, nausea and vomiting. Genitourinary: Negative for difficulty urinating and dysuria. Musculoskeletal: Positive for back pain and neck pain. Neurological: Positive for headaches. Negative for dizziness, speech difficulty, weakness, light-headedness and numbness.         Sciatica       OBJECTIVE:  /76 (Site: Left Upper Arm, Position: Sitting, Cuff (neck and back pain and spasm)  -     predniSONE (DELTASONE) 20 MG tablet; 2 pills daily for 3 days, 1 pills daily for 3 days, 1/2 daily for 3 days    Essential hypertension controlled   -     Comprehensive Metabolic Panel; Future    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 (HCC) no residual or problems  on aspirin and atorvastatin    -     Lipid Panel; Future    Stage 2 moderate COPD by GOLD classification ([facility]) albuterol prn     Primary insomnia temazepam prn     Prostate cancer screening  -     PSA screening; Future          Orders Placed This Encounter   Medications     tiZANidine (ZANAFLEX) 4  MG tablet     Sig: Take 1 tablet by mouth 3 times daily as needed (neck and back pain and spasm)     Dispense:  30 tablet     Refill:  2    predniSONE (DELTASONE) 20 MG tablet     Si pills daily for 3 days, 1 pills daily for 3 days, 1/2 daily for 3 days     Dispense:  11 tablet     Refill:  0        Return in about 4 months (around 2022), or if symptoms worsen or fail to improve. Patient Instructions     Patient Education        Neck Arthritis: Exercises  Introduction  Here are some examples of exercises for you to try. The exercises may be suggested for a condition or for rehabilitation. Start each exercise slowly. Ease off the exercises if you start to have pain. You will be told when to start these exercises and which ones will work best for you. How to do the exercises  Neck stretches to the side    1. This stretch works best if you keep your shoulder down as you lean away from it. To help you remember to do this, start by relaxing your shoulders and lightly holding on to your thighs or your chair. 2. Tilt your head toward your shoulder and hold for 15 to 30 seconds. Let the weight of your head stretch your muscles. 3. Repeat 2 to 4 times toward each shoulder. Chin tuck    1. Lie on the floor with a rolled-up towel under your neck.  Your head should be touching the floor.  2. Slowly bring your chin toward your chest.  3. Hold for a count of 6, and then relax for up to 10 seconds. 4. Repeat 8 to 12 times. Active cervical rotation    1. Sit in a firm chair, or stand up straight. 2. Keeping your chin level, turn your head to the right, and hold for 15 to 30 seconds. 3. Turn your head to the left and hold for 15 to 30 seconds. 4. Repeat 2 to 4 times to each side. Shoulder blade squeeze    1. While standing, squeeze your shoulder blades together. 2. Do not raise your shoulders up as you are squeezing. 3. Hold for 6 seconds. 4. Repeat 8 to 12 times. Shoulder rolls    1. Sit comfortably with your feet shoulder-width apart. You can also do this exercise standing up. 2. Roll your shoulders up, then back, and then down in a smooth, circular motion. 3. Repeat 2 to 4 times. Follow-up care is a key part of your treatment and safety. Be sure to make and go to all appointments, and call your doctor if you are having problems. It's also a good idea to know your test results and keep a list of the medicines you take. Where can you learn more? Go to https://"[employer]peDigitiliti.[email or web address]. org and sign in to your [employer] account. Enter D668 in the [employer] box to learn more about \"Neck Arthritis: Exercises. \"     If you do not have an account, please click on the \"Sign Up Now\" link. Current as of: July 1, 2021               Content Version: 13.1  © 2006-2021 Healthwise, Incorporated. Care instructions adapted under license by [facility] [address] If you have questions about a medical condition or this instruction, always ask your healthcare professional. [place] [address] any warranty or liability for your use of this information.

## 2022-01-25 NOTE — PATIENT INSTRUCTIONS
Patient Education        Neck Arthritis: Exercises  Introduction  Here are some examples of exercises for you to try. The exercises may be suggested for a condition or for rehabilitation. Start each exercise slowly. Ease off the exercises if you start to have pain. You will be told when to start these exercises and which ones will work best for you. How to do the exercises  Neck stretches to the side    1. This stretch works best if you keep your shoulder down as you lean away from it. To help you remember to do this, start by relaxing your shoulders and lightly holding on to your thighs or your chair. 2. Tilt your head toward your shoulder and hold for 15 to 30 seconds. Let the weight of your head stretch your muscles. 3. Repeat 2 to 4 times toward each shoulder. Chin tuck    1. Lie on the floor with a rolled-up towel under your neck. Your head should be touching the floor. 2. Slowly bring your chin toward your chest.  3. Hold for a count of 6, and then relax for up to 10 seconds. 4. Repeat 8 to 12 times. Active cervical rotation    1. Sit in a firm chair, or stand up straight. 2. Keeping your chin level, turn your head to the right, and hold for 15 to 30 seconds. 3. Turn your head to the left and hold for 15 to 30 seconds. 4. Repeat 2 to 4 times to each side. Shoulder blade squeeze    1. While standing, squeeze your shoulder blades together. 2. Do not raise your shoulders up as you are squeezing. 3. Hold for 6 seconds. 4. Repeat 8 to 12 times. Shoulder rolls    1. Sit comfortably with your feet shoulder-width apart. You can also do this exercise standing up. 2. Roll your shoulders up, then back, and then down in a smooth, circular motion. 3. Repeat 2 to 4 times. Follow-up care is a key part of your treatment and safety. Be sure to make and go to all appointments, and call your doctor if you are having problems.  It's also a good idea to know your test results and keep a list of the medicines you take. Where can you learn more? Go to https://chpepiceweb.healthIntell[email or web address] org and sign in to your [employer] account. Enter Z166 in the [employer] box to learn more about \"Neck Arthritis: Exercises. \"     If you do not have an account, please click on the \"Sign Up Now\" link. Current as of: July 1, 2021               Content Version: 13.1  © 2006-2021 Healthwise, Incorporated. Care instructions adapted under license by [facility] ([facility]). If you have questions about a medical condition or this instruction, always ask your healthcare professional. [address] any warranty or liability for your use of this information.

## 2022-01-25 NOTE — TELEPHONE ENCOUNTER
[employer] is calling about   tiZANidine (ZANAFLEX) 2 MG tablet     Pt was taking 4 mg and pt is not why the decreased dosage.   Herman James is calling to verify     miesha James   [phone]

## 2022-01-27 DIAGNOSIS — Z12.5 PROSTATE CANCER SCREENING: ICD-10-CM

## 2022-01-27 DIAGNOSIS — I10 ESSENTIAL HYPERTENSION: ICD-10-CM

## 2022-01-27 DIAGNOSIS — I63.311 CEREBRAL INFARCTION DUE TO THROMBOSIS OF RIGHT MIDDLE CEREBRAL ARTERY (HCC): ICD-10-CM

## 2022-01-27 LAB
A/G RATIO: 1.8 (ref 1.1–2.2)
ALBUMIN SERPL-MCNC: 4.6 G/DL (ref 3.4–5)
ALP BLD-CCNC: 115 U/L (ref 40–129)
ALT SERPL-CCNC: 31 U/L (ref 10–40)
ANION GAP SERPL CALCULATED.3IONS-SCNC: 18 MMOL/L (ref 3–16)
AST SERPL-CCNC: 27 U/L (ref 15–37)
BILIRUB SERPL-MCNC: 0.3 MG/DL (ref 0–1)
BUN BLDV-MCNC: 12 MG/DL (ref 7–20)
CALCIUM SERPL-MCNC: 9.8 MG/DL (ref 8.3–10.6)
CHLORIDE BLD-SCNC: 101 MMOL/L (ref 99–110)
CHOLESTEROL, TOTAL: 104 MG/DL (ref 0–199)
CO2: 24 MMOL/L (ref 21–32)
CREAT SERPL-MCNC: 1 MG/DL (ref 0.9–1.3)
GFR AFRICAN AMERICAN: >60
GFR NON-AFRICAN AMERICAN: >60
GLUCOSE BLD-MCNC: 118 MG/DL (ref 70–99)
HDLC SERPL-MCNC: 47 MG/DL (ref 40–60)
LDL CHOLESTEROL CALCULATED: 38 MG/DL
POTASSIUM SERPL-SCNC: 4.5 MMOL/L (ref 3.5–5.1)
PROSTATE SPECIFIC ANTIGEN: 1.1 NG/ML (ref 0–4)
SODIUM BLD-SCNC: 143 MMOL/L (ref 136–145)
TOTAL PROTEIN: 7.1 G/DL (ref 6.4–8.2)
TRIGL SERPL-MCNC: 96 MG/DL (ref 0–150)
VLDLC SERPL CALC-MCNC: 19 MG/DL

## 2022-02-02 ENCOUNTER — TELEPHONE (OUTPATIENT)
Dept: FAMILY MEDICINE CLINIC | Age: 62
End: 2022-02-02

## 2022-02-02 RX ORDER — PANTOPRAZOLE SODIUM 20 MG/1
TABLET, DELAYED RELEASE ORAL
Qty: 30 TABLET | Refills: 3 | Status: SHIPPED | OUTPATIENT
Start: 2022-02-02 | End: 2022-05-31

## 2022-02-03 ASSESSMENT — ENCOUNTER SYMPTOMS
VOMITING: 0
COUGH: 0
BACK PAIN: 1
NAUSEA: 0
ABDOMINAL PAIN: 0
SHORTNESS OF BREATH: 0
BLOOD IN STOOL: 0
DIARRHEA: 0

## 2022-02-07 ENCOUNTER — HOSPITAL ENCOUNTER (OUTPATIENT)
Dept: MRI IMAGING | Age: 62
Discharge: HOME | End: 2022-02-07
Payer: MEDICAID

## 2022-02-07 DIAGNOSIS — M54.32 BILATERAL SCIATICA: ICD-10-CM

## 2022-02-07 DIAGNOSIS — M50.90 CERVICAL DISC DISEASE: ICD-10-CM

## 2022-02-07 DIAGNOSIS — M54.31 BILATERAL SCIATICA: ICD-10-CM

## 2022-02-07 PROCEDURE — 72141 MRI NECK SPINE W/O DYE: CPT

## 2022-02-07 PROCEDURE — 72148 MRI LUMBAR SPINE W/O DYE: CPT

## 2022-02-08 DIAGNOSIS — M50.90 CERVICAL DISC DISEASE: Primary | ICD-10-CM

## 2022-02-08 DIAGNOSIS — M54.31 BILATERAL SCIATICA: ICD-10-CM

## 2022-02-08 DIAGNOSIS — M54.32 BILATERAL SCIATICA: ICD-10-CM

## 2022-02-09 ENCOUNTER — TELEPHONE (OUTPATIENT)
Dept: FAMILY MEDICINE CLINIC | Age: 62
End: 2022-02-09

## 2022-02-09 NOTE — TELEPHONE ENCOUNTER
rx pantoprazole (PROTONIX) 20 MG tablet was sent on 2/2/22 #30 with 3 refills  Called miesha and the pt did  this rx.

## 2022-02-14 RX ORDER — BACLOFEN 20 MG
TABLET ORAL
Qty: 30 TABLET | Refills: 5 | Status: SHIPPED | OUTPATIENT
Start: 2022-02-14 | End: 2022-08-12

## 2022-02-22 ENCOUNTER — OFFICE VISIT (OUTPATIENT)
Dept: ORTHOPEDIC SURGERY | Age: 62
End: 2022-02-22
Payer: MEDICAID

## 2022-02-22 VITALS — BODY MASS INDEX: 28.88 KG/M2 | WEIGHT: 195 LBS | HEIGHT: 69 IN

## 2022-02-22 DIAGNOSIS — M51.36 DDD (DEGENERATIVE DISC DISEASE), LUMBAR: Primary | ICD-10-CM

## 2022-02-22 DIAGNOSIS — M48.062 SPINAL STENOSIS OF LUMBAR REGION WITH NEUROGENIC CLAUDICATION: ICD-10-CM

## 2022-02-22 PROCEDURE — G8417 CALC BMI ABV UP PARAM F/U: HCPCS | Performed by: ORTHOPAEDIC SURGERY

## 2022-02-22 PROCEDURE — G8427 DOCREV CUR MEDS BY ELIG CLIN: HCPCS | Performed by: ORTHOPAEDIC SURGERY

## 2022-02-22 PROCEDURE — G8484 FLU IMMUNIZE NO ADMIN: HCPCS | Performed by: ORTHOPAEDIC SURGERY

## 2022-02-22 PROCEDURE — 99204 OFFICE O/P NEW MOD 45 MIN: CPT | Performed by: ORTHOPAEDIC SURGERY

## 2022-02-22 PROCEDURE — 3017F COLORECTAL CA SCREEN DOC REV: CPT | Performed by: ORTHOPAEDIC SURGERY

## 2022-02-22 PROCEDURE — 1036F TOBACCO NON-USER: CPT | Performed by: ORTHOPAEDIC SURGERY

## 2022-02-22 NOTE — PROGRESS NOTES
New Patient: LUMBAR SPINE    Referring Provider:  Harriett Gaspar MD    CHIEF COMPLAINT:    Chief Complaint   Patient presents with    Back Pain     Patient states that he has had back pain for many years. Patient has low back pain and bilateral leg pain and numbness in his toes on bilateral feet. Had PT in the past with no relief. HISTORY OF PRESENT ILLNESS:       Mr. Renetta Garcia  is a pleasant 63 y.o. male referred by his primary care physician Dorene Jerez MD, here for consultation regarding his LBP and bilateral leg pain. He states his pain began insidiously about 1 year ago. Patient has a history of an L4-5 decompression by Dr. Jeronimo Go in the 1990s. Patient has received disability due to his back pain in 2012. His pain has steadily continued and somewhat since then. He rates his back pain 9/10, bilateral buttock pain 9/10, bilateral posterior leg pain 9/10. He describes the pain as constant. Pain is worse with with any weightbearing activity and improved some with sitting and lying. The leg pain radiates down the posterior lateral aspect of his legs to his feet. He has numbness and tingling in his right and left leg to the lateral border of his feet. He denies weakness of his right or left leg and denies bowel or bladder dysfunction. The pain at times disrupts his sleep.    Pain Assessment  Location of Pain: Back  Location Modifiers: Medial,Left,Right  Severity of Pain: 8  Quality of Pain: Aching,Dull  Duration of Pain: Persistent  Frequency of Pain: Constant  Aggravating Factors: Walking,Standing  Limiting Behavior: Yes  Relieving Factors: Rest  Result of Injury: No  Work-Related Injury: No  Are there other pain locations you wish to document?: No]  Current/Past Treatment:   · Physical Therapy: None recently  · Chiropractic: None  · Injection: None  · Medications: None at present    Past Medical History:   Past Medical History:   Diagnosis Date    Alcohol abuse, in remission     2016 quit     Alcohol abuse, in remission     Arthritis     rt hip, hands    Asthma     Back pain     Cerebral artery occlusion with cerebral infarction (HCC)     COPD (chronic obstructive pulmonary disease) (HCC)     beatter since quit smoking in 2017    CVA (cerebral infarction) 4/28/13    affected L side     Depression     GERD (gastroesophageal reflux disease)     Hyperlipidemia     Hypertension     Insomnia     MRSA carrier     very long time ago, more than one year    Paresthesia of left leg     chronic problem    Paresthesia of right leg     Psoriasis (a type of skin inflammation)     Sciatic nerve pain, left     Sciatic nerve pain, right     Smoker     1.5 ppd        Past Surgical History:     Past Surgical History:   Procedure Laterality Date    ABDOMEN SURGERY      APPENDECTOMY      ruptured     BACK SURGERY      maybe a fusion, L4 L5 herniated disc    CHOLECYSTECTOMY, LAPAROSCOPIC  2/13/2015    LAPAROSCOPIC CHOLECYSTECTOMY WITH CHOLANGIOGRAM, LYSIS OF    COLONOSCOPY  10/01/2018    TN COLONOSCOPY FLX DX W/COLLJ SPEC WHEN PFRMD N/A 10/1/2018    COLONOSCOPY performed by Kenneth Avila MD at [address] [place] [address]  4/24/13    for ischemic bowel    TONSILLECTOMY         Current Medications:     Current Outpatient Medications:     Magnesium Oxide 500 MG TABS, TAKE ONE TABLET BY MOUTH DAILY, Disp: 30 tablet, Rfl: 5    temazepam (RESTORIL) 15 MG capsule, Take 1 capsule by mouth nightly as needed for Sleep for up to 30 days. , Disp: 30 capsule, Rfl: 0    pantoprazole (PROTONIX) 20 MG tablet, TAKE ONE TABLET BY MOUTH DAILY, Disp: 30 tablet, Rfl: 3    predniSONE (DELTASONE) 20 MG tablet, 2 pills daily for 3 days, 1 pills daily for 3 days, 1/2 daily for 3 days, Disp: 11 tablet, Rfl: 0    tiZANidine (ZANAFLEX) 4 MG tablet, Take 1 tablet by mouth 3 times daily as needed (back and neck spasm), Disp: 30 tablet, Rfl: 2    metoprolol succinate (TOPROL XL) 25 MG extended release tablet, TAKE ONE TABLET BY MOUTH DAILY, Disp: 30 tablet, Rfl: 3    traZODone (DESYREL) 50 MG tablet, Take 1 tablet by mouth nightly, Disp: 90 tablet, Rfl: 0    DULoxetine (CYMBALTA) 60 MG extended release capsule, Take 1 capsule by mouth daily, Disp: 90 capsule, Rfl: 1    isosorbide mononitrate (IMDUR) 30 MG extended release tablet, Take 1 tablet by mouth daily, Disp: 90 tablet, Rfl: 1    cetirizine (ZYRTEC) 10 MG tablet, TAKE ONE TABLET BY MOUTH DAILY, Disp: 30 tablet, Rfl: 1     MG tablet, TAKE ONE TABLET BY MOUTH THREE TIMES A DAY WITH MEALS, Disp: 90 tablet, Rfl: 0    lisinopril (PRINIVIL;ZESTRIL) 5 MG tablet, TAKE ONE TABLET BY MOUTH DAILY, Disp: 30 tablet, Rfl: 5    gabapentin (NEURONTIN) 600 MG tablet, TAKE ONE TABLET BY MOUTH THREE TIMES A DAY, Disp: 90 tablet, Rfl: 2    aspirin (ASPIRIN LOW DOSE) 81 MG chewable tablet, CHEW ONE TABLET BY MOUTH DAILY, Disp: 30 tablet, Rfl: 5    furosemide (LASIX) 20 MG tablet, TAKE ONE TABLET BY MOUTH DAILY, Disp: 30 tablet, Rfl: 3    vitamin D (CHOLECALCIFEROL) 25 MCG (1000 UT) TABS tablet, TAKE TWO TABLETS BY MOUTH DAILY, Disp: 60 tablet, Rfl: 3    albuterol sulfate  (90 Base) MCG/ACT inhaler, Inhale 2 puffs into the lungs 4 times daily, Disp: 18 g, Rfl: 11    SYMBICORT 160-4.5 MCG/ACT AERO, INHALE TWO PUFFS BY MOUTH TWICE A DAY, Disp: 10.2 g, Rfl: 5    atorvastatin (LIPITOR) 80 MG tablet, Take 80 mg by mouth nightly, Disp: , Rfl:     acetaminophen (TYLENOL) 500 MG tablet, Take 500 mg by mouth every 6 hours as needed, Disp: , Rfl:     ferrous sulfate (IRON 325) 325 (65 Fe) MG tablet, Take 325 mg by mouth daily (with breakfast), Disp: , Rfl:     clopidogrel (PLAVIX) 75 MG tablet, TAKE ONE TABLET BY MOUTH DAILY, Disp: 30 tablet, Rfl: 5    cyanocobalamin ([employer] VITAMIN B12) 1000 MCG tablet, Take 1 tablet by mouth daily, Disp: 30 tablet, Rfl: 2    Allergies:  Patient has no known allergies.     Social History:    reports that he quit smoking about 8 years ago. His smoking use included cigarettes. He started smoking about 39 years ago. He has a 15.00 pack-year smoking history. He has never used smokeless tobacco. He reports current drug use. Drug: Marijuana [employer]). He reports that he does not drink alcohol. Family History:   Family History   Problem Relation Age of Onset    Arthritis Mother     Heart Disease Mother         chf    Cancer Father         lung cancer     Heart Disease Father         CHF    No Known Problems Maternal Grandmother     No Known Problems Maternal Grandfather     No Known Problems Paternal Grandmother     No Known Problems Paternal Grandfather        REVIEW OF SYSTEMS: Full ROS noted & scanned   CONSTITUTIONAL: Denies unexplained weight loss, fevers, chills or fatigue  NEUROLOGICAL: Denies unsteady gait or progressive weakness  MUSCULOSKELETAL: Denies joint swelling or redness  PSYCHOLOGICAL: Denies anxiety, depression   SKIN: Denies skin changes, delayed healing, rash, itching   HEMATOLOGIC: Denies easy bleeding or bruising  ENDOCRINE: Denies excessive thirst, urination, heat/cold  RESPIRATORY: Denies current dyspnea, cough  GI: Denies nausea, vomiting, diarrhea   : Denies bowel or bladder issues      PHYSICAL EXAM:    Vitals: Height 5' 9\" (1.753 m), weight 195 lb (88.5 kg). GENERAL EXAM:  · General Apparence: Patient is adequately groomed with no evidence of malnutrition. · Orientation: The patient is oriented to time, place and person. · Mood & Affect:The patient's mood and affect are appropriate. · Vascular: Examination reveals no swelling tenderness in upper or lower extremities. Good capillary refill. · Lymphatic: The lymphatic examination bilaterally reveals all areas to be without enlargement or induration  · Sensation: Sensation is intact without deficit  · Coordination/Balance: Good coordination. LUMBAR/SACRAL EXAMINATION:  · Inspection: Local inspection shows no step-off or bruising.   Lumbar alignment is normal.  Sagittal and Coronal balance is neutral.      · Palpation:   No evidence of tenderness at the midline. No tenderness bilaterally at the paraspinal or trochanters. There is no step-off or paraspinal spasm. · Range of Motion: Lumbar flexion, extension and rotation are mildly limited due to pain. · Strength:   Strength testing is 5/5 in all muscle groups tested. · Special Tests:   Straight leg raise and crossed SLR negative. Leg length and pelvis level. · Skin: There are no rashes, ulcerations or lesions. · Reflexes: Reflexes are symmetrically 2+ at the patellar and ankle tendons. Clonus absent bilaterally at the feet. · Gait & station: Antalgic without an assistive device    · Additional Examinations:   · RIGHT LOWER EXTREMITY: Inspection/examination of the right lower extremity does not show any tenderness, deformity or injury. Range of motion is unremarkable. There is no gross instability. There are no rashes, ulcerations or lesions. Strength and tone are normal.  · LEFT LOWER EXTREMITY:  Inspection/examination of the left lower extremity does not show any tenderness, deformity or injury. Range of motion is unremarkable. There is no gross instability. There are no rashes, ulcerations or lesions.   Strength and tone are normal.    Diagnostic Testing:    MRI that was performed on 2/7/2022 was reviewed with the patient which shows  FINDINGS:   BONES/ALIGNMENT: The vertebral body heights are maintained.  There is   age-appropriate bone marrow signal. Ephriam Brooks is multilevel degenerative disc   disease with loss of disc signal.  There is disc space narrowing at L4-5 and   L5-S1.  There is minimal degenerative retrolisthesis of L5 on S1.       SPINAL CORD: The conus is normal in caliber and signal and terminates at the   L1 level.  The cauda equina is unremarkable.       SOFT TISSUES: The posterior paraspinal soft tissues are unremarkable.  The   visualized abdominal structures are unremarkable.     L1-L2: There is no significant disc herniation, spinal canal stenosis or   neural foraminal narrowing.       L2-L3: There is a mild circumferential disc bulge.  There is no canal   stenosis or foraminal narrowing.       L3-L4: There is a circumferential disc bulge with facet and ligamentous   hypertrophy.  There is no canal stenosis or foraminal narrowing.       L4-L5: There is a circumferential disc bulge with facet hypertrophy.  There   is canal stenosis measuring 8 mm in AP dimension.  There is mild bilateral   foraminal narrowing.  There is narrowing of the lateral recesses that is   worse on the right compared to the left.       L5-S1: There is a circumferential disc bulge with facet hypertrophy.  There   is no significant canal stenosis.  There is moderate bilateral foraminal   narrowing.  There is narrowing of the lateral recesses.           Impression   Multilevel degenerative change with canal stenosis at L4-5.       Mild bilateral foraminal narrowing at L4-5 and moderate bilateral foraminal   narrowing at L5-S1.       Narrowing of the lateral recesses at L4-5 and L5-S1. Impression:   DDD lumbar spine  Spinal stenosis L4-5  Foraminal stenosis L4-5 and L5-S1      Plan:      · We discussed the diagnosis and treatment options including observation, additional oral steroids, physical therapy, epidural injections and additional imaging. He wishes to proceed with referral to pain management. .    Follow up -as needed

## 2022-02-25 DIAGNOSIS — R60.0 BILATERAL LEG EDEMA: ICD-10-CM

## 2022-02-25 RX ORDER — FUROSEMIDE 20 MG/1
TABLET ORAL
Qty: 30 TABLET | Refills: 3 | Status: SHIPPED | OUTPATIENT
Start: 2022-02-25 | End: 2022-06-23

## 2022-03-01 ENCOUNTER — TELEPHONE (OUTPATIENT)
Dept: FAMILY MEDICINE CLINIC | Age: 62
End: 2022-03-01

## 2022-03-01 NOTE — TELEPHONE ENCOUNTER
----- Message from Virgilio Montenegro sent at 3/1/2022  2:21 PM EST -----  Subject: Message to Provider    QUESTIONS  Information for Provider? Patient would like to have an intogen machine   (spelling?) he saw it on tv. It is for the air so that he can use it when   he needs air. Can you please call him if you need more info.  ---------------------------------------------------------------------------  --------------  CALL BACK INFO  What is the best way for the office to contact you? OK to leave message on   voicemail  Preferred Call Back Phone Number? [phone]  ---------------------------------------------------------------------------  --------------  SCRIPT ANSWERS  Relationship to Patient?  Self

## 2022-03-02 NOTE — TELEPHONE ENCOUNTER
Pt informed without low O2 values, pft or 6 minute walk you wouldn't qualify for oxygen and it's not an in case you need dex thing. Pt advised that if he is having breathing issues he sholuld follow up with Dr Balwinder Gonzalez.

## 2022-03-03 DIAGNOSIS — H69.81 EUSTACHIAN TUBE DYSFUNCTION, RIGHT: ICD-10-CM

## 2022-03-03 RX ORDER — CETIRIZINE HYDROCHLORIDE 10 MG/1
TABLET ORAL
Qty: 30 TABLET | Refills: 2 | Status: SHIPPED | OUTPATIENT
Start: 2022-03-03 | End: 2022-06-01

## 2022-03-03 RX ORDER — GABAPENTIN 600 MG/1
TABLET ORAL
Qty: 90 TABLET | Refills: 2 | Status: SHIPPED | OUTPATIENT
Start: 2022-03-03 | End: 2022-06-03

## 2022-03-07 RX ORDER — BUDESONIDE AND FORMOTEROL FUMARATE DIHYDRATE 160; 4.5 UG/1; UG/1
AEROSOL RESPIRATORY (INHALATION)
Qty: 10.2 G | Refills: 5 | Status: SHIPPED | OUTPATIENT
Start: 2022-03-07 | End: 2022-09-30 | Stop reason: SDUPTHER

## 2022-03-21 DIAGNOSIS — F51.01 PRIMARY INSOMNIA: Primary | ICD-10-CM

## 2022-03-21 RX ORDER — TEMAZEPAM 15 MG/1
15 CAPSULE ORAL NIGHTLY PRN
Qty: 30 CAPSULE | Refills: 0 | Status: SHIPPED | OUTPATIENT
Start: 2022-03-21 | End: 2022-04-20

## 2022-04-04 RX ORDER — TIZANIDINE 4 MG/1
TABLET ORAL
Qty: 30 TABLET | Refills: 2 | Status: SHIPPED | OUTPATIENT
Start: 2022-04-04 | End: 2022-05-23 | Stop reason: SDUPTHER

## 2022-04-05 RX ORDER — TIZANIDINE 4 MG/1
4 TABLET ORAL 3 TIMES DAILY PRN
Qty: 30 TABLET | Refills: 2 | OUTPATIENT
Start: 2022-04-05

## 2022-04-05 NOTE — TELEPHONE ENCOUNTER
----- Message from Sly Hardin sent at 4/5/2022 12:03 PM EDT -----  Subject: Refill Request    QUESTIONS  Name of Medication? tiZANidine (ZANAFLEX) 4 MG tablet  Patient-reported dosage and instructions? Patient takes one pill three   times a day   How many days do you have left? 0  Preferred Pharmacy? [facility] [id number]  Pharmacy phone number (if available)? [phone]  ---------------------------------------------------------------------------  --------------  Rosa Sosa INFO  What is the best way for the office to contact you? OK to leave message on   voicemail  Preferred Call Back Phone Number? [phone]  ---------------------------------------------------------------------------  --------------  SCRIPT ANSWERS  Relationship to Patient?  Self

## 2022-04-11 RX ORDER — TRAZODONE HYDROCHLORIDE 50 MG/1
TABLET ORAL
Qty: 30 TABLET | Refills: 2 | Status: SHIPPED | OUTPATIENT
Start: 2022-04-11 | End: 2022-07-07 | Stop reason: SDUPTHER

## 2022-04-29 RX ORDER — ASPIRIN 81 MG/1
TABLET, CHEWABLE ORAL
Qty: 30 TABLET | Refills: 5 | Status: SHIPPED | OUTPATIENT
Start: 2022-04-29 | End: 2022-11-03 | Stop reason: SDUPTHER

## 2022-05-14 DIAGNOSIS — I10 ESSENTIAL HYPERTENSION: ICD-10-CM

## 2022-05-16 RX ORDER — METOPROLOL SUCCINATE 25 MG/1
TABLET, EXTENDED RELEASE ORAL
Qty: 30 TABLET | Refills: 3 | Status: SHIPPED | OUTPATIENT
Start: 2022-05-16 | End: 2022-09-12

## 2022-05-18 DIAGNOSIS — F51.01 PRIMARY INSOMNIA: Primary | ICD-10-CM

## 2022-05-18 RX ORDER — TEMAZEPAM 15 MG/1
15 CAPSULE ORAL NIGHTLY PRN
Qty: 30 CAPSULE | Refills: 0 | Status: SHIPPED | OUTPATIENT
Start: 2022-05-18 | End: 2022-06-15

## 2022-05-18 NOTE — TELEPHONE ENCOUNTER
I will fill but it is a controlled substance and needs a visit every 3 months before it is filled Last Reill without visit and should schedule about every 3 months while here

## 2022-05-23 ENCOUNTER — OFFICE VISIT (OUTPATIENT)
Dept: FAMILY MEDICINE CLINIC | Age: 62
End: 2022-05-23
Payer: MEDICAID

## 2022-05-23 VITALS
TEMPERATURE: 98.2 F | OXYGEN SATURATION: 98 % | DIASTOLIC BLOOD PRESSURE: 81 MMHG | HEIGHT: 69 IN | SYSTOLIC BLOOD PRESSURE: 110 MMHG | BODY MASS INDEX: 28.53 KG/M2 | WEIGHT: 192.6 LBS | HEART RATE: 76 BPM

## 2022-05-23 DIAGNOSIS — I63.311 CEREBRAL INFARCTION DUE TO THROMBOSIS OF RIGHT MIDDLE CEREBRAL ARTERY (HCC): ICD-10-CM

## 2022-05-23 DIAGNOSIS — I25.10 CORONARY ARTERY DISEASE INVOLVING NATIVE CORONARY ARTERY OF NATIVE HEART WITHOUT ANGINA PECTORIS: ICD-10-CM

## 2022-05-23 DIAGNOSIS — M54.32 BILATERAL SCIATICA: ICD-10-CM

## 2022-05-23 DIAGNOSIS — I10 ESSENTIAL HYPERTENSION: ICD-10-CM

## 2022-05-23 DIAGNOSIS — D50.9 IRON DEFICIENCY ANEMIA, UNSPECIFIED IRON DEFICIENCY ANEMIA TYPE: ICD-10-CM

## 2022-05-23 DIAGNOSIS — J44.9 CHRONIC OBSTRUCTIVE PULMONARY DISEASE, UNSPECIFIED COPD TYPE (HCC): ICD-10-CM

## 2022-05-23 DIAGNOSIS — R25.2 LEG CRAMPS: Primary | ICD-10-CM

## 2022-05-23 DIAGNOSIS — G25.81 RLS (RESTLESS LEGS SYNDROME): ICD-10-CM

## 2022-05-23 DIAGNOSIS — F31.78 BIPOLAR DISORDER, IN FULL REMISSION, MOST RECENT EPISODE MIXED (HCC): ICD-10-CM

## 2022-05-23 DIAGNOSIS — M54.31 BILATERAL SCIATICA: ICD-10-CM

## 2022-05-23 PROCEDURE — 99214 OFFICE O/P EST MOD 30 MIN: CPT | Performed by: INTERNAL MEDICINE

## 2022-05-23 PROCEDURE — G8427 DOCREV CUR MEDS BY ELIG CLIN: HCPCS | Performed by: INTERNAL MEDICINE

## 2022-05-23 PROCEDURE — 3017F COLORECTAL CA SCREEN DOC REV: CPT | Performed by: INTERNAL MEDICINE

## 2022-05-23 PROCEDURE — 3023F SPIROM DOC REV: CPT | Performed by: INTERNAL MEDICINE

## 2022-05-23 PROCEDURE — G8417 CALC BMI ABV UP PARAM F/U: HCPCS | Performed by: INTERNAL MEDICINE

## 2022-05-23 PROCEDURE — 1036F TOBACCO NON-USER: CPT | Performed by: INTERNAL MEDICINE

## 2022-05-23 RX ORDER — TIZANIDINE 4 MG/1
TABLET ORAL
Qty: 90 TABLET | Refills: 2 | Status: SHIPPED | OUTPATIENT
Start: 2022-05-23 | End: 2022-06-28

## 2022-05-23 RX ORDER — ROPINIROLE 0.25 MG/1
0.25 TABLET, FILM COATED ORAL 2 TIMES DAILY
Qty: 60 TABLET | Refills: 5 | Status: SHIPPED | OUTPATIENT
Start: 2022-05-23

## 2022-05-23 SDOH — ECONOMIC STABILITY: TRANSPORTATION INSECURITY
IN THE PAST 12 MONTHS, HAS LACK OF TRANSPORTATION KEPT YOU FROM MEETINGS, WORK, OR FROM GETTING THINGS NEEDED FOR DAILY LIVING?: NO

## 2022-05-23 SDOH — ECONOMIC STABILITY: TRANSPORTATION INSECURITY
IN THE PAST 12 MONTHS, HAS THE LACK OF TRANSPORTATION KEPT YOU FROM MEDICAL APPOINTMENTS OR FROM GETTING MEDICATIONS?: NO

## 2022-05-23 SDOH — ECONOMIC STABILITY: FOOD INSECURITY: WITHIN THE PAST 12 MONTHS, YOU WORRIED THAT YOUR FOOD WOULD RUN OUT BEFORE YOU GOT MONEY TO BUY MORE.: NEVER TRUE

## 2022-05-23 SDOH — ECONOMIC STABILITY: FOOD INSECURITY: WITHIN THE PAST 12 MONTHS, THE FOOD YOU BOUGHT JUST DIDN'T LAST AND YOU DIDN'T HAVE MONEY TO GET MORE.: NEVER TRUE

## 2022-05-23 ASSESSMENT — PATIENT HEALTH QUESTIONNAIRE - PHQ9
SUM OF ALL RESPONSES TO PHQ QUESTIONS 1-9: 1
SUM OF ALL RESPONSES TO PHQ QUESTIONS 1-9: 1
5. POOR APPETITE OR OVEREATING: 1
1. LITTLE INTEREST OR PLEASURE IN DOING THINGS: 0
SUM OF ALL RESPONSES TO PHQ QUESTIONS 1-9: 1
SUM OF ALL RESPONSES TO PHQ QUESTIONS 1-9: 1
8. MOVING OR SPEAKING SO SLOWLY THAT OTHER PEOPLE COULD HAVE NOTICED. OR THE OPPOSITE, BEING SO FIGETY OR RESTLESS THAT YOU HAVE BEEN MOVING AROUND A LOT MORE THAN USUAL: 0
6. FEELING BAD ABOUT YOURSELF - OR THAT YOU ARE A FAILURE OR HAVE LET YOURSELF OR YOUR FAMILY DOWN: 0
SUM OF ALL RESPONSES TO PHQ9 QUESTIONS 1 & 2: 0
4. FEELING TIRED OR HAVING LITTLE ENERGY: 0
7. TROUBLE CONCENTRATING ON THINGS, SUCH AS READING THE NEWSPAPER OR WATCHING TELEVISION: 0
10. IF YOU CHECKED OFF ANY PROBLEMS, HOW DIFFICULT HAVE THESE PROBLEMS MADE IT FOR YOU TO DO YOUR WORK, TAKE CARE OF THINGS AT HOME, OR GET ALONG WITH OTHER PEOPLE: 0
2. FEELING DOWN, DEPRESSED OR HOPELESS: 0
3. TROUBLE FALLING OR STAYING ASLEEP: 0
9. THOUGHTS THAT YOU WOULD BE BETTER OFF DEAD, OR OF HURTING YOURSELF: 0

## 2022-05-23 ASSESSMENT — SOCIAL DETERMINANTS OF HEALTH (SDOH): HOW HARD IS IT FOR YOU TO PAY FOR THE VERY BASICS LIKE FOOD, HOUSING, MEDICAL CARE, AND HEATING?: NOT HARD AT ALL

## 2022-05-23 NOTE — PROGRESS NOTES
SUBJECTIVE:  Alton Kessler is a 63 y.o. male being evaluated for:    Chief Complaint   Patient presents with    Follow-up      Patient is here for [facility] followup today . HPI   Doing well    Mood has been good no agitation    Back is not doing to well Bad still with bad cramping in his legs On magnesium and vitamin d and iron pill    No Known Allergies  Current Outpatient Medications   Medication Sig Dispense Refill    rOPINIRole (REQUIP) 0.25 MG tablet Take 1 tablet by mouth in the morning and at bedtime 60 tablet 5    tiotropium-olodaterol (STIOLTO RESPIMAT) 2.5-2.5 MCG/ACT AERS Inhale 2 puffs into the lungs daily 1 each 5    tiZANidine (ZANAFLEX) 4 MG tablet TAKE ONE TABLET BY MOUTH THREE TIMES A DAY AS NEEDED FOR BACK AND NECK SPASM 90 tablet 2    temazepam (RESTORIL) 15 MG capsule Take 1 capsule by mouth nightly as needed for Sleep for up to 30 days.  30 capsule 0    metoprolol succinate (TOPROL XL) 25 MG extended release tablet TAKE ONE TABLET BY MOUTH DAILY 30 tablet 3    aspirin (ASPIRIN LOW DOSE) 81 MG chewable tablet CHEW ONE TABLET BY MOUTH DAILY 30 tablet 5    traZODone (DESYREL) 50 MG tablet TAKE ONE TABLET BY MOUTH ONCE NIGHTLY 30 tablet 2    SYMBICORT 160-4.5 MCG/ACT AERO INHALE TWO PUFFS BY MOUTH TWICE A DAY 10.2 g 5    gabapentin (NEURONTIN) 600 MG tablet TAKE ONE TABLET BY MOUTH THREE TIMES A DAY 90 tablet 2    cetirizine (ZYRTEC) 10 MG tablet TAKE ONE TABLET BY MOUTH DAILY 30 tablet 2    furosemide (LASIX) 20 MG tablet TAKE ONE TABLET BY MOUTH DAILY 30 tablet 3    Magnesium Oxide 500 MG TABS TAKE ONE TABLET BY MOUTH DAILY 30 tablet 5    pantoprazole (PROTONIX) 20 MG tablet TAKE ONE TABLET BY MOUTH DAILY 30 tablet 3    DULoxetine (CYMBALTA) 60 MG extended release capsule Take 1 capsule by mouth daily 90 capsule 1    isosorbide mononitrate (IMDUR) 30 MG extended release tablet Take 1 tablet by mouth daily 90 tablet 1     MG tablet TAKE ONE TABLET BY MOUTH THREE TIMES A DAY WITH MEALS 90 tablet 0    lisinopril (PRINIVIL;ZESTRIL) 5 MG tablet TAKE ONE TABLET BY MOUTH DAILY 30 tablet 5    vitamin D (CHOLECALCIFEROL) 25 MCG (1000 UT) TABS tablet TAKE TWO TABLETS BY MOUTH DAILY 60 tablet 3    albuterol sulfate  (90 Base) MCG/ACT inhaler Inhale 2 puffs into the lungs 4 times daily 18 g 11    atorvastatin (LIPITOR) 80 MG tablet Take 80 mg by mouth nightly      acetaminophen (TYLENOL) 500 MG tablet Take 500 mg by mouth every 6 hours as needed      ferrous sulfate (IRON 325) 325 (65 Fe) MG tablet Take 325 mg by mouth daily (with breakfast)      clopidogrel (PLAVIX) 75 MG tablet TAKE ONE TABLET BY MOUTH DAILY 30 tablet 5    cyanocobalamin ([employer] VITAMIN B12) 1000 MCG tablet Take 1 tablet by mouth daily 30 tablet 2     No current facility-administered medications for this visit. Social History     Socioeconomic History    Marital status:      Spouse name: Not on file    Number of children: Not on file    Years of education: 8    Highest education level: Not on file   Occupational History    Occupation: unemployed     Comment: disability from back. 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   Tobacco Use    Smoking status: Former Smoker     Packs/day: 1.00     Years: 15.00     Pack years: 15.00     Types: Cigarettes     Start date:      Quit date: 2013     Years since quittin.0    Smokeless tobacco: Never Used    Tobacco comment: off and on  was up to 2 packs a day   Vaping Use    Vaping Use: Never used   Substance and Sexual Activity    Alcohol use: No     Alcohol/week: 0.0 standard drinks     Comment: in remission    Drug use: Yes     Types: Marijuana Norval Remak)     Comment: 2-3x/month    Sexual activity: Yes     Partners: Female   Other Topics Concern    Not on file   Social History Narrative    Lives alone in [facility] up in [place].  Reports childhood was good, parents  when he was a baby, but he maintained and good relationship with both of them and still communicates with them frequently. He has 3 brothers, 1 sister - has a good relationship with them     Dropped out of school 10th grade since he was doing poorly, struggled with math, and wanted to seek employment. Social Determinants of Health     Financial Resource Strain: Low Risk     Difficulty of Paying Living Expenses: Not hard at all   Food Insecurity: No Food Insecurity    Worried About Running Out of Food in the Last Year: Never true    Luc of Food in the Last Year: Never true   Transportation Needs: No Transportation Needs    Lack of Transportation (Medical): No    Lack of Transportation (Non-Medical):  No   Physical Activity:     Days of Exercise per Week: Not on file    Minutes of Exercise per Session: Not on file   Stress:     Feeling of Stress : Not on file   Social Connections:     Frequency of Communication with Friends and Family: Not on file    Frequency of Social Gatherings with Friends and Family: Not on file    Attends Worship Services: Not on file    Active Member of [address] [place] [address] or Organizations: Not on file    Attends Club or Organization Meetings: Not on file    Marital Status: Not on file   Intimate Partner Violence:     Fear of Current or Ex-Partner: Not on file    Emotionally Abused: Not on file    Physically Abused: Not on file    Sexually Abused: Not on file   Housing Stability:     Unable to Pay for Housing in the Last Year: Not on file    Number of [place] in the Last Year: Not on file    Unstable Housing in the Last Year: Not on file      Past Medical History:   Diagnosis Date    Alcohol abuse, in remission     2016 quit     Alcohol abuse, in remission     Arthritis     rt hip, hands    Asthma     Back pain     [facility]erebral artery occlusion with [facility] infarction [facility] [facility]     COPD (chronic obstructive pulmonary disease) ([facility] [address] [facility]     wandater since quit smoking in 2017    CVA (cerebral infarction) 4/28/13 affected L side     Depression     GERD (gastroesophageal reflux disease)     Hyperlipidemia     Hypertension     Insomnia     MRSA carrier     very long time ago, more than one year    Paresthesia of left leg     chronic problem    Paresthesia of right leg     Psoriasis (a type of skin inflammation)     Sciatic nerve pain, left     Sciatic nerve pain, right     Smoker     1.5 ppd     Past Surgical History:   Procedure Laterality Date    ABDOMEN SURGERY      APPENDECTOMY      ruptured     BACK SURGERY      maybe a fusion, L4 L5 herniated disc    CHOLECYSTECTOMY, LAPAROSCOPIC  2/13/2015    LAPAROSCOPIC CHOLECYSTECTOMY WITH CHOLANGIOGRAM, LYSIS OF    COLONOSCOPY  10/01/2018    IL COLONOSCOPY FLX DX W/COLLJ SPEC WHEN PFRMD N/A 10/1/2018    COLONOSCOPY performed by Yokasta James MD at [address] [place] [address]  4/24/13    for ischemic bowel    TONSILLECTOMY         Review of Systems   Constitutional: Negative for unexpected weight change. HENT: Negative for nosebleeds. Eyes: Negative for visual disturbance. Respiratory: Negative for cough and shortness of breath. Cardiovascular: Negative for chest pain, palpitations and leg swelling. Gastrointestinal: Negative for abdominal pain, blood in stool, diarrhea, nausea and vomiting. Genitourinary: Negative for difficulty urinating and dysuria. Musculoskeletal: Positive for back pain and myalgias (leg cramping ). Neurological: Positive for headaches. Negative for dizziness, speech difficulty, weakness, light-headedness and numbness. Sciatica   Psychiatric/Behavioral: Positive for sleep disturbance. Negative for agitation, behavioral problems and dysphoric mood. The patient is not nervous/anxious.         OBJECTIVE:  /81 (Site: Right Upper Arm, Position: Sitting, Cuff Size: Medium Adult)   Pulse 76   Temp 98.2 °F (36.8 °C) (Oral)   Ht 5' 9\" (1.753 m)   Wt 192 lb 9.6 oz (87.4 kg)   SpO2 98%   BMI 28.44 kg/m²      Body mass index is 28.44 kg/m². Physical Exam  Constitutional:       General: He is not in acute distress. Appearance: Normal appearance. He is well-developed. HENT:      Head: Normocephalic and atraumatic. Eyes:      Conjunctiva/sclera: Conjunctivae normal.   Neck:      Thyroid: No thyromegaly. Vascular: No carotid bruit or JVD. Trachea: No tracheal deviation. Cardiovascular:      Rate and Rhythm: Normal rate and regular rhythm. Heart sounds: Normal heart sounds. No murmur heard. No friction rub. No gallop. Pulmonary:      Effort: Pulmonary effort is normal.   Chest:      Chest wall: No tenderness. Abdominal:      General: There is no distension. Palpations: Abdomen is soft. Tenderness: There is no abdominal tenderness. Comments: No HSM. Genitourinary:     Penis: No tenderness. Musculoskeletal:         General: Tenderness (right para spinal ) present. No swelling. Cervical back: Neck supple. Tenderness (c7 area ) present. No rigidity. Lymphadenopathy:      Cervical: No cervical adenopathy. Skin:     General: Skin is warm and dry. Neurological:      General: No focal deficit present. Mental Status: He is alert. Motor: No abnormal muscle tone. Gait: Gait normal.      Deep Tendon Reflexes: Reflexes are normal and symmetric. ASSESSMENT/PLAN:    Cristine Dorantes was seen today for follow-up. Diagnoses and all orders for this visit:    Leg cramps question back disease RLS    -     tiZANidine (ZANAFLEX) 4 MG tablet; TAKE ONE TABLET BY MOUTH THREE TIMES A DAY AS NEEDED FOR BACK AND NECK SPASM    Bilateral sciatica  -     tiZANidine (ZANAFLEX) 4 MG tablet; TAKE ONE TABLET BY MOUTH THREE TIMES A DAY AS NEEDED FOR BACK AND NECK SPASM    RLS (restless legs syndrome)  -     Iron and TIBC  -     Ferritin; Future  -     rOPINIRole (REQUIP) 0.25 MG tablet;  Take 1 tablet by mouth in the morning and at bedtime  Essential hypertension    Chronic obstructive pulmonary disease, unspecified COPD type ([facility] [address]  -     tiotropium-olodaterol (STIOLTO RESPIMAT) 2.5-2.5 MCG/ACT AERS; Inhale 2 puffs into the lungs daily    Coronary artery disease involving native coronary artery of native heart without angina pectoris    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 (HCC) on antiplatelet agents and chol meds     Iron deficiency anemia, unspecified iron deficiency anemia type  -     CBC with Auto Differential; Future  -     Iron and TIBC  -     Ferritin; Future    Bipolar disorder, in full remission, most recent episode mixed (HCC) stable     chronic insmonia on Restoril and trazodone oarrs was reviewed         Orders Placed This Encounter   Medications    rOPINIRole (REQUIP) 0.25 MG tablet     Sig: Take 1 tablet by mouth in the morning and at bedtime     Dispense:  60 tablet     Refill:  5    tiotropium-olodaterol (STIOLTO RESPIMAT) 2.5-2.5 MCG/ACT AERS     Sig: Inhale 2 puffs into the lungs daily     Dispense:  1 each     Refill:  5    tiZANidine (ZANAFLEX) 4 MG tablet     Sig: TAKE ONE TABLET BY MOUTH THREE TIMES A DAY AS NEEDED FOR BACK AND NECK SPASM     Dispense:  90 tablet     Refill:  2        Return in about 6 months (around 11/23/2022), or if symptoms worsen or fail to improve, for RLS copd follow up . There are no Patient Instructions on file for this visit.

## 2022-05-30 ASSESSMENT — ENCOUNTER SYMPTOMS
VOMITING: 0
ABDOMINAL PAIN: 0
NAUSEA: 0
BACK PAIN: 1
BLOOD IN STOOL: 0
SHORTNESS OF BREATH: 0
DIARRHEA: 0
COUGH: 0

## 2022-05-31 RX ORDER — PANTOPRAZOLE SODIUM 20 MG/1
TABLET, DELAYED RELEASE ORAL
Qty: 30 TABLET | Refills: 5 | Status: SHIPPED | OUTPATIENT
Start: 2022-05-31

## 2022-06-01 DIAGNOSIS — H69.81 EUSTACHIAN TUBE DYSFUNCTION, RIGHT: ICD-10-CM

## 2022-06-01 RX ORDER — CETIRIZINE HYDROCHLORIDE 10 MG/1
TABLET ORAL
Qty: 30 TABLET | Refills: 2 | Status: SHIPPED | OUTPATIENT
Start: 2022-06-01 | End: 2022-08-30

## 2022-06-03 RX ORDER — GABAPENTIN 600 MG/1
TABLET ORAL
Qty: 90 TABLET | Refills: 2 | Status: SHIPPED | OUTPATIENT
Start: 2022-06-03 | End: 2022-08-30

## 2022-06-07 RX ORDER — LISINOPRIL 5 MG/1
TABLET ORAL
Qty: 30 TABLET | Refills: 5 | Status: SHIPPED | OUTPATIENT
Start: 2022-06-07

## 2022-06-14 DIAGNOSIS — F51.01 PRIMARY INSOMNIA: ICD-10-CM

## 2022-06-15 RX ORDER — TEMAZEPAM 15 MG/1
15 CAPSULE ORAL NIGHTLY PRN
Qty: 30 CAPSULE | Refills: 2 | Status: SHIPPED | OUTPATIENT
Start: 2022-06-15 | End: 2022-09-13

## 2022-06-23 DIAGNOSIS — R60.0 BILATERAL LEG EDEMA: ICD-10-CM

## 2022-06-23 RX ORDER — FUROSEMIDE 20 MG/1
TABLET ORAL
Qty: 30 TABLET | Refills: 3 | Status: SHIPPED | OUTPATIENT
Start: 2022-06-23 | End: 2022-11-03 | Stop reason: SDUPTHER

## 2022-06-28 ENCOUNTER — OFFICE VISIT (OUTPATIENT)
Dept: PAIN MANAGEMENT | Age: 62
End: 2022-06-28
Payer: MEDICAID

## 2022-06-28 VITALS
TEMPERATURE: 98.1 F | SYSTOLIC BLOOD PRESSURE: 125 MMHG | OXYGEN SATURATION: 96 % | WEIGHT: 192 LBS | DIASTOLIC BLOOD PRESSURE: 81 MMHG | HEART RATE: 83 BPM | HEIGHT: 69 IN | BODY MASS INDEX: 28.44 KG/M2

## 2022-06-28 DIAGNOSIS — M50.30 DDD (DEGENERATIVE DISC DISEASE), CERVICAL: ICD-10-CM

## 2022-06-28 DIAGNOSIS — M25.551 HIP PAIN, BILATERAL: ICD-10-CM

## 2022-06-28 DIAGNOSIS — M51.26 DISPLACEMENT OF LUMBAR INTERVERTEBRAL DISC WITHOUT MYELOPATHY: ICD-10-CM

## 2022-06-28 DIAGNOSIS — G89.29 CHRONIC PAIN OF BOTH KNEES: ICD-10-CM

## 2022-06-28 DIAGNOSIS — M47.816 FACET HYPERTROPHY OF LUMBAR REGION: ICD-10-CM

## 2022-06-28 DIAGNOSIS — F10.21 ALCOHOL USE DISORDER, SEVERE, IN SUSTAINED REMISSION (HCC): ICD-10-CM

## 2022-06-28 DIAGNOSIS — M25.562 CHRONIC PAIN OF BOTH KNEES: ICD-10-CM

## 2022-06-28 DIAGNOSIS — G89.4 CHRONIC PAIN SYNDROME: ICD-10-CM

## 2022-06-28 DIAGNOSIS — M54.40 CHRONIC BILATERAL LOW BACK PAIN WITH SCIATICA, SCIATICA LATERALITY UNSPECIFIED: ICD-10-CM

## 2022-06-28 DIAGNOSIS — M62.830 BACK SPASM: ICD-10-CM

## 2022-06-28 DIAGNOSIS — F41.9 ANXIETY: ICD-10-CM

## 2022-06-28 DIAGNOSIS — Z98.890 BACK PAIN WITH HISTORY OF SPINAL SURGERY: ICD-10-CM

## 2022-06-28 DIAGNOSIS — M54.9 BACK PAIN WITH HISTORY OF SPINAL SURGERY: ICD-10-CM

## 2022-06-28 DIAGNOSIS — F32.A DEPRESSION, UNSPECIFIED DEPRESSION TYPE: ICD-10-CM

## 2022-06-28 DIAGNOSIS — M25.561 CHRONIC PAIN OF BOTH KNEES: ICD-10-CM

## 2022-06-28 DIAGNOSIS — G89.29 CHRONIC BILATERAL LOW BACK PAIN WITH SCIATICA, SCIATICA LATERALITY UNSPECIFIED: ICD-10-CM

## 2022-06-28 DIAGNOSIS — M25.552 HIP PAIN, BILATERAL: ICD-10-CM

## 2022-06-28 PROCEDURE — 99244 OFF/OP CNSLTJ NEW/EST MOD 40: CPT | Performed by: NURSE PRACTITIONER

## 2022-06-28 PROCEDURE — G8417 CALC BMI ABV UP PARAM F/U: HCPCS | Performed by: NURSE PRACTITIONER

## 2022-06-28 PROCEDURE — G8427 DOCREV CUR MEDS BY ELIG CLIN: HCPCS | Performed by: NURSE PRACTITIONER

## 2022-06-28 RX ORDER — TIZANIDINE 4 MG/1
2 TABLET ORAL 2 TIMES DAILY
Qty: 60 TABLET | Refills: 0 | Status: SHIPPED | OUTPATIENT
Start: 2022-06-28 | End: 2022-07-26 | Stop reason: SDUPTHER

## 2022-06-28 RX ORDER — NALOXONE HYDROCHLORIDE 4 MG/.1ML
SPRAY NASAL
Qty: 1 EACH | Refills: 0 | Status: SHIPPED | OUTPATIENT
Start: 2022-06-28

## 2022-06-28 RX ORDER — BUPRENORPHINE HYDROCHLORIDE 75 UG/1
75 FILM, SOLUBLE BUCCAL EVERY 12 HOURS
Qty: 56 EACH | Refills: 0 | Status: SHIPPED | OUTPATIENT
Start: 2022-06-28 | End: 2022-07-26

## 2022-06-28 RX ORDER — LIDOCAINE 36 MG/1
1 PATCH TOPICAL DAILY
Qty: 60 PATCH | Refills: 0 | Status: SHIPPED | OUTPATIENT
Start: 2022-06-28 | End: 2022-07-26 | Stop reason: SDUPTHER

## 2022-06-28 NOTE — PATIENT INSTRUCTIONS
Patient Education        Back Stretches: Exercises  Introduction  Here are some examples of exercises for stretching your back. Start eachexercise slowly. Ease off the exercise if you start to have pain. Your doctor or physical therapist will tell you when you can start theseexercises and which ones will work best for you. How to do the exercises  Overhead stretch    1. Stand comfortably with your feet shoulder-width apart. 2. Looking straight ahead, raise both arms over your head and reach toward the ceiling. Do not allow your head to tilt back. 3. Hold for 15 to 30 seconds, then lower your arms to your sides. 4. Repeat 2 to 4 times. Side stretch    1. Stand comfortably with your feet shoulder-width apart. 2. Raise one arm over your head, and then lean to the other side. 3. Slide your hand down your leg as you let the weight of your arm gently stretch your side muscles. Hold for 15 to 30 seconds. 4. Repeat 2 to 4 times on each side. Press-up    1. Lie on your stomach, supporting your body with your forearms. 2. Press your elbows down into the floor to raise your upper back. As you do this, relax your stomach muscles and allow your back to arch without using your back muscles. As your press up, do not let your hips or pelvis come off the floor. 3. Hold for 15 to 30 seconds, then relax. 4. Repeat 2 to 4 times. Relax and rest    1. Lie on your back with a rolled towel under your neck and a pillow under your knees. Extend your arms comfortably to your sides. 2. Relax and breathe normally. 3. Remain in this position for about 10 minutes. 4. If you can, do this 2 or 3 times each day. Follow-up care is a key part of your treatment and safety. Be sure to make and go to all appointments, and call your doctor if you are having problems. It's also a good idea to know your test results and keep alist of the medicines you take. Where can you learn more? Go to https://tania.[employer]. org and sign in to your Confovis account. Enter T548 in the [employer] box to learn more about \"Back Stretches: Exercises. \"     If you do not have an account, please click on the \"Sign Up Now\" link. Current as of: March 9, 2022               Content Version: 13.3  © 2006-2022 [employer]. Care instructions adapted under license by [facility] ([facility]). If you have questions about a medical condition or this instruction, always ask your healthcare professional. [place] [address] any warranty or liability for your use of this information. Patient Education        Neck Arthritis: Exercises  Introduction  Here are some examples of exercises for you to try. The exercises may be suggested for a condition or for rehabilitation. Start each exercise slowly. Ease off the exercises if you start to have pain. You will be told when to start these exercises and which ones will work bestfor you. How to do the exercises  Neck stretches to the side    1. This stretch works best if you keep your shoulder down as you lean away from it. To help you remember to do this, start by relaxing your shoulders and lightly holding on to your thighs or your chair. 2. Tilt your head toward your shoulder and hold for 15 to 30 seconds. Let the weight of your head stretch your muscles. 3. Repeat 2 to 4 times toward each shoulder. Chin tuck    1. Lie on the floor with a rolled-up towel under your neck. Your head should be touching the floor. 2. Slowly bring your chin toward your chest.  3. Hold for a count of 6, and then relax for up to 10 seconds. 4. Repeat 8 to 12 times. Active cervical rotation    1. Sit in a firm chair, or stand up straight. 2. Keeping your chin level, turn your head to the right, and hold for 15 to 30 seconds. 3. Turn your head to the left and hold for 15 to 30 seconds. 4. Repeat 2 to 4 times to each side. Shoulder blade squeeze    1.  While standing, squeeze your shoulder blades together. 2. Do not raise your shoulders up as you are squeezing. 3. Hold for 6 seconds. 4. Repeat 8 to 12 times. Shoulder rolls    1. Sit comfortably with your feet shoulder-width apart. You can also do this exercise standing up. 2. Roll your shoulders up, then back, and then down in a smooth, circular motion. 3. Repeat 2 to 4 times. Follow-up care is a key part of your treatment and safety. Be sure to make and go to all appointments, and call your doctor if you are having problems. It's also a good idea to know your test results and keep alist of the medicines you take. Where can you learn more? Go to https://Pro[employer] CareClinicseb.[employer]Reach. org and sign in to your Nautal account. Enter D214 in the [employer] box to learn more about \"Neck Arthritis: Exercises. \"     If you do not have an account, please click on the \"Sign Up Now\" link. Current as of: March 9, 2022               Content Version: 13.3  © 2006-2022 [employer]. Care instructions adapted under license by [facility] ([facility]). If you have questions about a medical condition or this instruction, always ask your healthcare professional. [place] [address] any warranty or liability for your use of this information.

## 2022-06-28 NOTE — PROGRESS NOTES
HISTORY OF PRESENT ILLNESS:  Mr. Charley Reyes is a 63 y.o. male presents for consultation at the kind request of Dr. Rachael Suarez for chronic pain management. His presenting problems are pain the lower back radiating to bilateral lower extremities. Onset of pain began over 4 to 5 years ago. Reports having picked up a heavy load of material that strained his back, pain seem to have worsened since then. Recalls having had lumbar surgery in the past, unable to note who did the surgery or when that was completed. Denies having had injections or surgical procedures in the past. He is a poor historian. Care path records show he was treated by Dr. Daryl Pina in 2011-12 as well as Omero Gavin PA-C. AMINATA in 2012 to the lumbar spine was completed by Dr. Shannon Vergara. Has not been treated by pain management past nor has he been on narcotics. Currently on Neurontin 600 mg 3 times daily through his PCP. Other health conditions include hypertension, history of cerebral infarction on Plavix, COPD, psoriasis. Denies having had physical therapy in the last year. He rates the pain lower back at 5/10, 7/10 in the legs, 5/10 in the neck, 3/10 in the arms. She describes it as aching, burning, numbness, tingling, pins and needles. Pain is greaterin his lower back than neck. Pain is made worse by: walking, standing, sitting, bending, lifting, chores, going up the stairs, putting shoes on. Activities that have been limited by pain that he otherwise tolerated well are walking. Alternative therapies he has previously attempted are anti-inflammatories, muscle relaxants, ice/heat pack, electrical stimulator, exercise by therapist. Current treatment regimen has helped relieve about 20% of the pain. Relieving factors of pain include lying on the side in a fetal position, massage therapy. Hedenies side effects from the current pain regimen.   In the last week she reports having extreme bothersome symptoms to the lower back all the time with numbness tingling to the legs. Patient reports mood is depressed and that she has no suicidal/homicidal inclination. admits to having a history of depression which contributed to alcohol abuse in the past. Currently on Temazepam 15 mg for insomnia. Sleep patterns are fair-good with an average of 6-7 hours on Temazepam. Patient denies neurological bowel or bladder concerns. Patient denies misusing/abusing his narcotic pain medications or using any illegal drugs. Has not drank alcohol for over 8 years. he admits to morning stiffness, denies fatigue, and headaches. Currently on disability, lives alone. Former smoker, denies alcohol consumption. ROS:  The patient's social history, past medical history, family history, medications, allergies and review of systems have all been reviewed and verified from  the patient questionnaire form which has been filled by the patient. The  allergies, medication list  and past medical and surgical history and other information recorded by the MA was again reviewed today. Please check page 6 of the patient questionnaire for for family history  These forms have been scanned into the \"media\" tab of patients electronic medical record. PHYSICAL EXAM:  Please see the physical exam form for a detailed examination on this visit. This form has been completed and scanned into the  Media section of the chart    Physical Exam  Constitutional:       General: He is not in acute distress. Appearance: He is well-developed. HENT:      Head: Normocephalic and atraumatic. Nose: Nose normal.   Eyes:      Conjunctiva/sclera: Conjunctivae normal.      Pupils: Pupils are equal, round, and reactive to light. Neck:      Thyroid: No thyromegaly. Cardiovascular:      Rate and Rhythm: Normal rate and regular rhythm. Heart sounds: Normal heart sounds. Pulmonary:      Effort: Pulmonary effort is normal. No respiratory distress. Breath sounds: Normal breath sounds.    Abdominal: over dose fatality in addition to impairing cognitive functioning. . Patient verbalized understanding.  -Education provided on Belbuca, side effects reviewed. Abuse deterrent properties of Belbuca are favorable given chronic benzodiazepine therapy. Patient would have to wean off the benzos to be considered for oral/tablet form narcotics  -CBT techniques- relaxation therapies such as biofeedback, mindfulness based stress reduction, imagery, cognitive restructuring, problem solving discussed with patient  -SOAPP score 1  -ORT score 2 low risk  -PHQ-9 score 4 minimal depression  -Return in about 4 weeks (around 7/26/2022). Controlled Substances Monitoring: Periodic Controlled Substance Monitoring: No signs of potential drug abuse or diversion identified.  Jamal Cheng, APRN - CNP)

## 2022-06-29 ENCOUNTER — TELEPHONE (OUTPATIENT)
Dept: PAIN MANAGEMENT | Age: 62
End: 2022-06-29

## 2022-06-29 NOTE — TELEPHONE ENCOUNTER
Submitted PA for [employer]  Via [facility] Key: [id number] STATUS: NOT SENT. This is a MEDICAID plan; I need the NARCOTIC AGREEMENT scanned in.

## 2022-06-30 ENCOUNTER — TELEPHONE (OUTPATIENT)
Dept: PAIN MANAGEMENT | Age: 62
End: 2022-06-30

## 2022-06-30 DIAGNOSIS — Z98.890 BACK PAIN WITH HISTORY OF SPINAL SURGERY: ICD-10-CM

## 2022-06-30 DIAGNOSIS — M54.40 CHRONIC BILATERAL LOW BACK PAIN WITH SCIATICA, SCIATICA LATERALITY UNSPECIFIED: ICD-10-CM

## 2022-06-30 DIAGNOSIS — G89.4 CHRONIC PAIN SYNDROME: Primary | ICD-10-CM

## 2022-06-30 DIAGNOSIS — M47.816 FACET HYPERTROPHY OF LUMBAR REGION: ICD-10-CM

## 2022-06-30 DIAGNOSIS — M54.9 BACK PAIN WITH HISTORY OF SPINAL SURGERY: ICD-10-CM

## 2022-06-30 DIAGNOSIS — G89.29 CHRONIC BILATERAL LOW BACK PAIN WITH SCIATICA, SCIATICA LATERALITY UNSPECIFIED: ICD-10-CM

## 2022-06-30 RX ORDER — BUPRENORPHINE 7.5 UG/H
1 PATCH TRANSDERMAL WEEKLY
Qty: 4 PATCH | Refills: 0 | Status: SHIPPED | OUTPATIENT
Start: 2022-06-30 | End: 2022-07-30

## 2022-06-30 NOTE — TELEPHONE ENCOUNTER
Submitted PA for [employer]  Via [facility] Key: [id number]  STATUS: DENIED; Hali Patel 139 letter scanned in [address] If this requires a response please respond to the pool ( P [facility] [address]). Thank you please advise patient.

## 2022-06-30 NOTE — TELEPHONE ENCOUNTER
Please see clinical questions for scanned in MEDIA. The patient has not been on a short acting OPIOID for atleast 3 months. The OARRS is sent with every MEDICAID PA. If this requires a response please respond to the pool ( P [facility] [address]). Thank you please advise patient.

## 2022-07-01 ENCOUNTER — TELEPHONE (OUTPATIENT)
Dept: PAIN MANAGEMENT | Age: 62
End: 2022-07-01

## 2022-07-01 NOTE — TELEPHONE ENCOUNTER
Submitted PA for [employer]  Via [facility] Key: [id number] STATUS: PENDING. THE PATIENT HAS A HISTORY OF SUBSTANCE ABUSE. HE IS NOT A CANDIDATE FOR SHORT ACTING OPIOID THERAPY. A BUPRENORPHINE PRODUCT IS THE ONLY CHOICE TO CONTROL THE PATIENT'S CHRONIC PAIN.

## 2022-07-02 DIAGNOSIS — R07.9 CHEST PAIN, UNSPECIFIED TYPE: ICD-10-CM

## 2022-07-05 ENCOUNTER — HOSPITAL ENCOUNTER (OUTPATIENT)
Dept: GENERAL RADIOLOGY | Age: 62
Discharge: HOME | End: 2022-07-05
Payer: MEDICAID

## 2022-07-05 ENCOUNTER — HOSPITAL ENCOUNTER (OUTPATIENT)
Age: 62
Discharge: HOME | End: 2022-07-05
Payer: MEDICAID

## 2022-07-05 DIAGNOSIS — G89.4 CHRONIC PAIN SYNDROME: ICD-10-CM

## 2022-07-05 DIAGNOSIS — M25.561 CHRONIC PAIN OF BOTH KNEES: ICD-10-CM

## 2022-07-05 DIAGNOSIS — M25.562 CHRONIC PAIN OF BOTH KNEES: ICD-10-CM

## 2022-07-05 DIAGNOSIS — M25.551 HIP PAIN, BILATERAL: ICD-10-CM

## 2022-07-05 DIAGNOSIS — G89.29 CHRONIC PAIN OF BOTH KNEES: ICD-10-CM

## 2022-07-05 DIAGNOSIS — M25.552 HIP PAIN, BILATERAL: ICD-10-CM

## 2022-07-05 PROCEDURE — 73521 X-RAY EXAM HIPS BI 2 VIEWS: CPT

## 2022-07-05 PROCEDURE — 73560 X-RAY EXAM OF KNEE 1 OR 2: CPT

## 2022-07-05 RX ORDER — ISOSORBIDE MONONITRATE 30 MG/1
TABLET, EXTENDED RELEASE ORAL
Qty: 90 TABLET | Refills: 0 | Status: SHIPPED | OUTPATIENT
Start: 2022-07-05 | End: 2022-10-04 | Stop reason: SDUPTHER

## 2022-07-05 NOTE — TELEPHONE ENCOUNTER
Butrans 7. 5MCG/HR weekly patches  APPROVED effective 7/1/2022-10/1/2022    Patient notified via phone

## 2022-07-07 DIAGNOSIS — F32.9 REACTIVE DEPRESSION: ICD-10-CM

## 2022-07-07 RX ORDER — TRAZODONE HYDROCHLORIDE 50 MG/1
TABLET ORAL
Qty: 30 TABLET | Refills: 2 | Status: SHIPPED | OUTPATIENT
Start: 2022-07-07 | End: 2022-10-03

## 2022-07-07 RX ORDER — DULOXETIN HYDROCHLORIDE 60 MG/1
CAPSULE, DELAYED RELEASE ORAL
Qty: 30 CAPSULE | Refills: 3 | Status: SHIPPED | OUTPATIENT
Start: 2022-07-07

## 2022-07-26 ENCOUNTER — OFFICE VISIT (OUTPATIENT)
Dept: PAIN MANAGEMENT | Age: 62
End: 2022-07-26
Payer: MEDICAID

## 2022-07-26 VITALS
HEART RATE: 73 BPM | OXYGEN SATURATION: 96 % | SYSTOLIC BLOOD PRESSURE: 114 MMHG | BODY MASS INDEX: 28.44 KG/M2 | HEIGHT: 69 IN | WEIGHT: 192 LBS | TEMPERATURE: 97.2 F | DIASTOLIC BLOOD PRESSURE: 72 MMHG

## 2022-07-26 DIAGNOSIS — G89.29 CHRONIC PAIN OF BOTH KNEES: ICD-10-CM

## 2022-07-26 DIAGNOSIS — M25.552 HIP PAIN, BILATERAL: ICD-10-CM

## 2022-07-26 DIAGNOSIS — G89.4 CHRONIC PAIN SYNDROME: ICD-10-CM

## 2022-07-26 DIAGNOSIS — G89.29 CHRONIC BILATERAL LOW BACK PAIN WITH SCIATICA, SCIATICA LATERALITY UNSPECIFIED: ICD-10-CM

## 2022-07-26 DIAGNOSIS — F10.11 ALCOHOL ABUSE, IN REMISSION: ICD-10-CM

## 2022-07-26 DIAGNOSIS — M50.30 DDD (DEGENERATIVE DISC DISEASE), CERVICAL: ICD-10-CM

## 2022-07-26 DIAGNOSIS — M51.26 DISPLACEMENT OF LUMBAR INTERVERTEBRAL DISC WITHOUT MYELOPATHY: ICD-10-CM

## 2022-07-26 DIAGNOSIS — M25.551 HIP PAIN, BILATERAL: ICD-10-CM

## 2022-07-26 DIAGNOSIS — M25.561 CHRONIC PAIN OF BOTH KNEES: ICD-10-CM

## 2022-07-26 DIAGNOSIS — M25.562 CHRONIC PAIN OF BOTH KNEES: ICD-10-CM

## 2022-07-26 DIAGNOSIS — M54.40 CHRONIC BILATERAL LOW BACK PAIN WITH SCIATICA, SCIATICA LATERALITY UNSPECIFIED: ICD-10-CM

## 2022-07-26 DIAGNOSIS — M47.816 FACET HYPERTROPHY OF LUMBAR REGION: ICD-10-CM

## 2022-07-26 DIAGNOSIS — Z98.890 BACK PAIN WITH HISTORY OF SPINAL SURGERY: ICD-10-CM

## 2022-07-26 DIAGNOSIS — M54.9 BACK PAIN WITH HISTORY OF SPINAL SURGERY: ICD-10-CM

## 2022-07-26 DIAGNOSIS — M62.830 BACK SPASM: ICD-10-CM

## 2022-07-26 PROCEDURE — G8427 DOCREV CUR MEDS BY ELIG CLIN: HCPCS | Performed by: NURSE PRACTITIONER

## 2022-07-26 PROCEDURE — 3017F COLORECTAL CA SCREEN DOC REV: CPT | Performed by: NURSE PRACTITIONER

## 2022-07-26 PROCEDURE — 1036F TOBACCO NON-USER: CPT | Performed by: NURSE PRACTITIONER

## 2022-07-26 PROCEDURE — G8417 CALC BMI ABV UP PARAM F/U: HCPCS | Performed by: NURSE PRACTITIONER

## 2022-07-26 PROCEDURE — 99214 OFFICE O/P EST MOD 30 MIN: CPT | Performed by: NURSE PRACTITIONER

## 2022-07-26 RX ORDER — BUPRENORPHINE HYDROCHLORIDE 75 UG/1
75 FILM, SOLUBLE BUCCAL EVERY 12 HOURS
Qty: 56 EACH | Refills: 0 | Status: CANCELLED | OUTPATIENT
Start: 2022-07-26 | End: 2022-08-23

## 2022-07-26 RX ORDER — LIDOCAINE 36 MG/1
1 PATCH TOPICAL DAILY
Qty: 60 PATCH | Refills: 0 | Status: SHIPPED | OUTPATIENT
Start: 2022-07-26 | End: 2022-08-25

## 2022-07-26 RX ORDER — TIZANIDINE 4 MG/1
2 TABLET ORAL 2 TIMES DAILY
Qty: 60 TABLET | Refills: 0 | Status: SHIPPED | OUTPATIENT
Start: 2022-07-26 | End: 2022-08-25

## 2022-07-26 NOTE — PROGRESS NOTES
Doyce Code  1960  [id number]      HISTORY OF PRESENT ILLNESS: Mr. Kaleb Ferguson is a 63 y.o. male returns for a follow up visit for pain management  He has a diagnosis of   1. Chronic pain syndrome    2. Displacement of lumbar intervertebral disc without myelopathy    3. Facet hypertrophy of lumbar region    4. Chronic bilateral low back pain with sciatica, sciatica laterality unspecified    5. Back pain with history of spinal surgery, lumbar    6. Back spasm    7. DDD (degenerative disc disease), cervical    8. Hip pain, bilateral    9. Chronic pain of both knees    10. Alcohol abuse, in remission      As per Information Obtained from the PADT (Patient Assessment and Documentation Tool)    He complains of pain in the both leg(s): entire area and bilateral lower back He rates the pain 7/10 and describes it as sharp, aching, numbness, pins and needles. Current treatment regimen has helped relieve about 0% of the pain. He denies any side effects from the current pain regimen. Patient reports that since the last follow up visit the physical functioning is unchanged, family/social relationships are unchanged, mood is unchanged sleep patterns are unchanged, and that the overall functioning is unchanged. Patient denies misusing/abusing his narcotic pain medications or using any illegal drugs. Upon obtaining medical history from Mr. Kaleb Ferguson states that pain is somewhat manageable on current pain therapy. Was not sure how to use the Butrans, therefore does not believe he did not achieve the best pain relief. Mood/anxiety is stable. Sleep is fair with an average of 5-6 hours. Denies to having issues of constipation. Tolerating activities/house chores with moderate tenderness to the lower back. ALLERGIES: Patients list of allergies were reviewed     MEDICATIONS: Mr. Kaleb Ferguson list of medications were reviewed. His current medications are   Outpatient Medications Prior to Visit   Medication Sig Dispense Refill DULoxetine (CYMBALTA) 60 MG extended release capsule TAKE ONE CAPSULE BY MOUTH DAILY 30 capsule 3    traZODone (DESYREL) 50 MG tablet TAKE ONE TABLET BY MOUTH ONCE NIGHTLY 30 tablet 2    isosorbide mononitrate (IMDUR) 30 MG extended release tablet TAKE ONE TABLET BY MOUTH DAILY 90 tablet 0    buprenorphine (BUTRANS) 7.5 MCG/HR PTWK Place 1 patch onto the skin once a week for 30 days. 4 patch 0    naloxone (NARCAN) 4 MG/0.1ML LIQD nasal spray Use as directed 1 each 0    furosemide (LASIX) 20 MG tablet TAKE ONE TABLET BY MOUTH DAILY 30 tablet 3    temazepam (RESTORIL) 15 MG capsule Take 1 capsule by mouth nightly as needed for Sleep for up to 90 days.  30 capsule 2    lisinopril (PRINIVIL;ZESTRIL) 5 MG tablet TAKE ONE TABLET BY MOUTH DAILY 30 tablet 5    gabapentin (NEURONTIN) 600 MG tablet TAKE ONE TABLET BY MOUTH THREE TIMES A DAY 90 tablet 2    cetirizine (ZYRTEC) 10 MG tablet TAKE ONE TABLET BY MOUTH DAILY 30 tablet 2    pantoprazole (PROTONIX) 20 MG tablet TAKE ONE TABLET BY MOUTH DAILY 30 tablet 5    rOPINIRole (REQUIP) 0.25 MG tablet Take 1 tablet by mouth in the morning and at bedtime 60 tablet 5    tiotropium-olodaterol (STIOLTO RESPIMAT) 2.5-2.5 MCG/ACT AERS Inhale 2 puffs into the lungs daily 1 each 5    metoprolol succinate (TOPROL XL) 25 MG extended release tablet TAKE ONE TABLET BY MOUTH DAILY 30 tablet 3    aspirin (ASPIRIN LOW DOSE) 81 MG chewable tablet CHEW ONE TABLET BY MOUTH DAILY 30 tablet 5    SYMBICORT 160-4.5 MCG/ACT AERO INHALE TWO PUFFS BY MOUTH TWICE A DAY 10.2 g 5    Magnesium Oxide 500 MG TABS TAKE ONE TABLET BY MOUTH DAILY 30 tablet 5     MG tablet TAKE ONE TABLET BY MOUTH THREE TIMES A DAY WITH MEALS 90 tablet 0    vitamin D (CHOLECALCIFEROL) 25 MCG (1000 UT) TABS tablet TAKE TWO TABLETS BY MOUTH DAILY 60 tablet 3    albuterol sulfate  (90 Base) MCG/ACT inhaler Inhale 2 puffs into the lungs 4 times daily 18 g 11    atorvastatin (LIPITOR) 80 MG tablet Take 80 mg by mouth ORT Score = 3 low risk              Other Risk factors - (mood) Stable              Date of Last Medication Agreement: 6/28/22              Date Naloxone prescribed: 7/28/22              UDT:                          Date of last UDT: 6/28/22                          Adverse report: No;               OARRS:                          Checked today: Yes                          Adverse report: No    IMPRESSION:   1. Chronic pain syndrome    2. Displacement of lumbar intervertebral disc without myelopathy    3. Facet hypertrophy of lumbar region    4. Chronic bilateral low back pain with sciatica, sciatica laterality unspecified    5. Back pain with history of spinal surgery, lumbar    6. Back spasm    7. DDD (degenerative disc disease), cervical    8. Hip pain, bilateral    9. Chronic pain of both knees    10. Alcohol abuse, in remission        PLAN:  Informed verbal consent was obtained:  -Patient's opioid therapy will be maintained at current dose  -Reviewed Butrans patch with the patient, application and usage reviewed. -Home exercises/Oliva exercises recommended  -Informed patient that opioids cannot be prescribed for him while using marijuana, he can otherwise be treated with non opioids if he chose to continue with marijuana. Advised to quit using marijuana if he hoped to obtain opioids therapy for pain. Patient verbalized understanding, and opted to quit using marijuana with hopes of being treated with opioids in the near future   -Coached patient on failed UDS +THC, admitted to using marijuana through a friend. Informed the patient that marijuana is not prescribed at this clinic, Information was provided to medical marijuana doctors in [address] [place] [address]. He will have to pass UDS to be considered for opioids under my care.  He verbalized understanding  -CBT techniques- relaxation therapies such as biofeedback, mindfulness based stress reduction, imagery, cognitive restructuring, problem solving discussed with patient   -Last UDS 6/28/22 inconsistent +THC  -Return in about 4 weeks (around 8/23/2022). Analgesic Plan:              Continue present regimen: Butrans 7.5 ncg film TD weekly, patient has 3 patches at home              Adjust dose of present analgesic: No              Switch analgesics: No              Add/Adjust concomitant therapy: Zanaflex, Lidocaine, Narcan, Neurontin 600 mg tabs through his PCP    Current Outpatient Medications   Medication Sig Dispense Refill    Lidocaine (ZTLIDO) 1.8 % PTCH Apply 1 patch topically daily Apply to the cervical, Lumbar spine daily 60 patch 0    tiZANidine (ZANAFLEX) 4 MG tablet Take 0.5 tablets by mouth in the morning and 0.5 tablets before bedtime. 60 tablet 0    DULoxetine (CYMBALTA) 60 MG extended release capsule TAKE ONE CAPSULE BY MOUTH DAILY 30 capsule 3    traZODone (DESYREL) 50 MG tablet TAKE ONE TABLET BY MOUTH ONCE NIGHTLY 30 tablet 2    isosorbide mononitrate (IMDUR) 30 MG extended release tablet TAKE ONE TABLET BY MOUTH DAILY 90 tablet 0    buprenorphine (BUTRANS) 7.5 MCG/HR PTWK Place 1 patch onto the skin once a week for 30 days. 4 patch 0    naloxone (NARCAN) 4 MG/0.1ML LIQD nasal spray Use as directed 1 each 0    furosemide (LASIX) 20 MG tablet TAKE ONE TABLET BY MOUTH DAILY 30 tablet 3    temazepam (RESTORIL) 15 MG capsule Take 1 capsule by mouth nightly as needed for Sleep for up to 90 days.  30 capsule 2    lisinopril (PRINIVIL;ZESTRIL) 5 MG tablet TAKE ONE TABLET BY MOUTH DAILY 30 tablet 5    gabapentin (NEURONTIN) 600 MG tablet TAKE ONE TABLET BY MOUTH THREE TIMES A DAY 90 tablet 2    cetirizine (ZYRTEC) 10 MG tablet TAKE ONE TABLET BY MOUTH DAILY 30 tablet 2    pantoprazole (PROTONIX) 20 MG tablet TAKE ONE TABLET BY MOUTH DAILY 30 tablet 5    rOPINIRole (REQUIP) 0.25 MG tablet Take 1 tablet by mouth in the morning and at bedtime 60 tablet 5    tiotropium-olodaterol (STIOLTO RESPIMAT) 2.5-2.5 MCG/ACT AERS Inhale 2 puffs into the lungs daily 1 each 5 metoprolol succinate (TOPROL XL) 25 MG extended release tablet TAKE ONE TABLET BY MOUTH DAILY 30 tablet 3    aspirin (ASPIRIN LOW DOSE) 81 MG chewable tablet CHEW ONE TABLET BY MOUTH DAILY 30 tablet 5    SYMBICORT 160-4.5 MCG/ACT AERO INHALE TWO PUFFS BY MOUTH TWICE A DAY 10.2 g 5    Magnesium Oxide 500 MG TABS TAKE ONE TABLET BY MOUTH DAILY 30 tablet 5     MG tablet TAKE ONE TABLET BY MOUTH THREE TIMES A DAY WITH MEALS 90 tablet 0    vitamin D (CHOLECALCIFEROL) 25 MCG (1000 UT) TABS tablet TAKE TWO TABLETS BY MOUTH DAILY 60 tablet 3    albuterol sulfate  (90 Base) MCG/ACT inhaler Inhale 2 puffs into the lungs 4 times daily 18 g 11    atorvastatin (LIPITOR) 80 MG tablet Take 80 mg by mouth nightly      acetaminophen (TYLENOL) 500 MG tablet Take 500 mg by mouth every 6 hours as needed      ferrous sulfate (IRON 325) 325 (65 Fe) MG tablet Take 325 mg by mouth daily (with breakfast)      clopidogrel (PLAVIX) 75 MG tablet TAKE ONE TABLET BY MOUTH DAILY 30 tablet 5    cyanocobalamin ([employer] VITAMIN B12) 1000 MCG tablet Take 1 tablet by mouth daily 30 tablet 2     No current facility-administered medications for this visit. I will continue his current medication regimen  which is part of the above treatment schedule. It has been helping with Mr. Estelita Steen chronic  medical problems which for this visit include:   Diagnoses of Chronic pain syndrome, Displacement of lumbar intervertebral disc without myelopathy, Facet hypertrophy of lumbar region, Chronic bilateral low back pain with sciatica, sciatica laterality unspecified, Back pain with history of spinal surgery, lumbar, Back spasm, DDD (degenerative disc disease), cervical, Hip pain, bilateral, Chronic pain of both knees, and Alcohol abuse, in remission were pertinent to this visit. Risks and benefits of the medications and other alternative treatments  including no treatment were discussed with the patient. The common side effects of these medications were also explained to the patient. Informed verbal consent was obtained. Goals of current treatment regimen include improvement in pain, restoration of functioning- with focus on improvement in physical performance, general activity, work or disability,emotional distress, health care utilization and  decreased medication consumption. Will continue to monitor progress towards achieving/maintaining therapeutic goals with special emphasis on  1. Improvement in perceived interfernce  of pain with ADL's. Ability to do home exercises independently. Ability to do household chores indoor and/or outdoor work and social and leisure activities. Improve psychosocial and physical functioning. - he is showing progression towards this treatment goal with the current regimen. He was advised against drinking alcohol with the narcotic pain medicines, advised against driving or handling machinery while adjusting the dose of medicines or if having cognitive  issues related to the current medications. Risk of overdose and death, if medicines not taken as prescribed, were also discussed. If the patient develops new symptoms or if the symptoms worsen, the patient should call the office. While transcribing every attempt was made to maintain the accuracy of the note in terms of it's contents,there may have been some errors made inadvertently. Thank you for allowing me to participate in the care of this patient. Oskar Maldonado CNP.     Cc: Fátima Schaeffer MD

## 2022-08-12 RX ORDER — BACLOFEN 20 MG
TABLET ORAL
Qty: 30 TABLET | Refills: 5 | Status: SHIPPED | OUTPATIENT
Start: 2022-08-12

## 2022-08-30 DIAGNOSIS — H69.81 EUSTACHIAN TUBE DYSFUNCTION, RIGHT: ICD-10-CM

## 2022-08-30 RX ORDER — CETIRIZINE HYDROCHLORIDE 10 MG/1
TABLET ORAL
Qty: 30 TABLET | Refills: 2 | Status: SHIPPED | OUTPATIENT
Start: 2022-08-30

## 2022-08-30 RX ORDER — GABAPENTIN 600 MG/1
TABLET ORAL
Qty: 90 TABLET | Refills: 2 | Status: SHIPPED | OUTPATIENT
Start: 2022-08-30 | End: 2022-11-30

## 2022-09-11 DIAGNOSIS — I10 ESSENTIAL HYPERTENSION: ICD-10-CM

## 2022-09-12 RX ORDER — METOPROLOL SUCCINATE 25 MG/1
TABLET, EXTENDED RELEASE ORAL
Qty: 30 TABLET | Refills: 3 | Status: SHIPPED | OUTPATIENT
Start: 2022-09-12

## 2022-09-30 RX ORDER — BUDESONIDE AND FORMOTEROL FUMARATE DIHYDRATE 160; 4.5 UG/1; UG/1
AEROSOL RESPIRATORY (INHALATION)
Qty: 10.2 G | Refills: 5 | Status: SHIPPED | OUTPATIENT
Start: 2022-09-30

## 2022-10-03 RX ORDER — TRAZODONE HYDROCHLORIDE 50 MG/1
TABLET ORAL
Qty: 30 TABLET | Refills: 2 | Status: SHIPPED | OUTPATIENT
Start: 2022-10-03

## 2022-10-04 DIAGNOSIS — R07.9 CHEST PAIN, UNSPECIFIED TYPE: ICD-10-CM

## 2022-10-04 RX ORDER — ISOSORBIDE MONONITRATE 30 MG/1
TABLET, EXTENDED RELEASE ORAL
Qty: 90 TABLET | Refills: 0 | Status: SHIPPED | OUTPATIENT
Start: 2022-10-04

## 2022-10-06 DIAGNOSIS — R60.0 BILATERAL LEG EDEMA: ICD-10-CM

## 2022-10-06 RX ORDER — TEMAZEPAM 15 MG/1
CAPSULE ORAL
Qty: 30 CAPSULE | OUTPATIENT
Start: 2022-10-06

## 2022-10-11 DIAGNOSIS — F51.01 PRIMARY INSOMNIA: ICD-10-CM

## 2022-10-11 RX ORDER — TEMAZEPAM 15 MG/1
15 CAPSULE ORAL NIGHTLY PRN
Qty: 30 CAPSULE | Refills: 0 | Status: SHIPPED | OUTPATIENT
Start: 2022-10-11 | End: 2022-11-10

## 2022-10-11 NOTE — TELEPHONE ENCOUNTER
----- Message from April Nagle sent at 10/11/2022  8:25 AM EDT -----  Subject: Refill Request    QUESTIONS  Name of Medication? temazepam (RESTORIL) 15 MG capsule  Patient-reported dosage and instructions? Take 1 capsule by mouth nightly   as needed for Sleep for up to 30 days. How many days do you have left? 0  Preferred Pharmacy? [facility] [id number]  Pharmacy phone number (if available)? [phone]  ---------------------------------------------------------------------------  --------------  Tanya FRANCES  What is the best way for the office to contact you? OK to leave message on   voicemail  Preferred Call Back Phone Number? [phone]  ---------------------------------------------------------------------------  --------------  SCRIPT ANSWERS  Relationship to Patient?  Self

## 2022-10-28 RX ORDER — TIZANIDINE 4 MG/1
TABLET ORAL
Qty: 90 TABLET | Refills: 0 | Status: SHIPPED | OUTPATIENT
Start: 2022-10-28 | End: 2022-11-30 | Stop reason: SDUPTHER

## 2022-11-03 ENCOUNTER — TELEPHONE (OUTPATIENT)
Dept: FAMILY MEDICINE CLINIC | Age: 62
End: 2022-11-03

## 2022-11-03 RX ORDER — FUROSEMIDE 20 MG/1
TABLET ORAL
Qty: 30 TABLET | Refills: 3 | Status: SHIPPED | OUTPATIENT
Start: 2022-11-03

## 2022-11-03 RX ORDER — ASPIRIN 81 MG/1
TABLET, CHEWABLE ORAL
Qty: 30 TABLET | Refills: 5 | Status: SHIPPED | OUTPATIENT
Start: 2022-11-03

## 2022-11-03 NOTE — TELEPHONE ENCOUNTER
----- Message from Jarrelljenniferbyron Butlerchrissie sent at 11/3/2022 11:12 AM EDT -----  Subject: Refill Request    QUESTIONS  Name of Medication? furosemide (LASIX) 20 MG tablet  Patient-reported dosage and instructions? 1 tablet daily  How many days do you have left? 0  Preferred Pharmacy? [facility] [id number]  Pharmacy phone number (if available)? [phone]  Additional Information for Provider? Pt is requesting a 30 day supply of   meds. Pt has 0 days of rx left. Please send rx to pharmacy for pt.   ---------------------------------------------------------------------------  --------------  [address]  What is the best way for the office to contact you? OK to leave message on   voicemail  Preferred Call Back Phone Number? [phone]  ---------------------------------------------------------------------------  --------------  SCRIPT ANSWERS  Relationship to Patient?  Self

## 2022-11-07 DIAGNOSIS — F51.01 PRIMARY INSOMNIA: ICD-10-CM

## 2022-11-07 RX ORDER — TEMAZEPAM 15 MG/1
CAPSULE ORAL
Qty: 30 CAPSULE | OUTPATIENT
Start: 2022-11-07

## 2022-11-07 NOTE — TELEPHONE ENCOUNTER
Tried calling ph # given number not in service. I don't see that we called him. We got a refill request for Temazepam the dr still needs to address but we did not call him. He also has julio ppt on 11/10 he may of got a reminder call reminding him of his appt?

## 2022-11-07 NOTE — TELEPHONE ENCOUNTER
----- Message from Lyssa Romero sent at 11/7/2022  2:31 PM EST -----  Subject: Message to Provider    QUESTIONS  Information for Provider? Pt is returning a call to Marlys. Pt would   like a call back.    ---------------------------------------------------------------------------  --------------  Leanne Minor [facility]  [phone]; OK to leave message on voicemail  ---------------------------------------------------------------------------  --------------  SCRIPT ANSWERS  undefined

## 2022-11-17 ENCOUNTER — TELEPHONE (OUTPATIENT)
Dept: FAMILY MEDICINE CLINIC | Age: 62
End: 2022-11-17

## 2022-11-17 DIAGNOSIS — F32.9 REACTIVE DEPRESSION: ICD-10-CM

## 2022-11-17 RX ORDER — DULOXETIN HYDROCHLORIDE 60 MG/1
CAPSULE, DELAYED RELEASE ORAL
Qty: 30 CAPSULE | Refills: 1 | Status: SHIPPED | OUTPATIENT
Start: 2022-11-17

## 2022-11-17 NOTE — TELEPHONE ENCOUNTER
Once again I am not sure why he is seeing Dolores Willis tomorrow if this is an acute problem otherwise if this is for the temazepam visit and medicines he needs to be rescheduled with me. Not sure if he can see me tomorrow?     Cymbalta was sent

## 2022-11-17 NOTE — TELEPHONE ENCOUNTER
----- Message from Katharina Marrerorenata [place] sent at 11/17/2022  2:58 PM EST -----  Subject: Refill Request    QUESTIONS  Name of Medication? DULoxetine (CYMBALTA) 60 MG extended release capsule  Patient-reported dosage and instructions? Take 1 tab daily  How many days do you have left? 0  Preferred Pharmacy? [facility] [id number]  Pharmacy phone number (if available)? [phone]  ---------------------------------------------------------------------------  --------------  CALL BACK INFO  What is the best way for the office to contact you? OK to leave message on   voicemail  Preferred Call Back Phone Number? [phone]  ---------------------------------------------------------------------------  --------------  SCRIPT ANSWERS  Relationship to Patient?  Self

## 2022-11-21 RX ORDER — ROPINIROLE 0.25 MG/1
TABLET, FILM COATED ORAL
Qty: 60 TABLET | Refills: 5 | Status: SHIPPED | OUTPATIENT
Start: 2022-11-21

## 2022-11-27 DIAGNOSIS — H69.81 EUSTACHIAN TUBE DYSFUNCTION, RIGHT: ICD-10-CM

## 2022-11-28 RX ORDER — PANTOPRAZOLE SODIUM 20 MG/1
TABLET, DELAYED RELEASE ORAL
Qty: 10 TABLET | Refills: 0 | Status: SHIPPED | OUTPATIENT
Start: 2022-11-28

## 2022-11-28 RX ORDER — CETIRIZINE HYDROCHLORIDE 10 MG/1
TABLET ORAL
Qty: 30 TABLET | Refills: 2 | Status: SHIPPED | OUTPATIENT
Start: 2022-11-28

## 2022-11-29 DIAGNOSIS — F51.01 PRIMARY INSOMNIA: ICD-10-CM

## 2022-11-30 ENCOUNTER — OFFICE VISIT (OUTPATIENT)
Dept: FAMILY MEDICINE CLINIC | Age: 62
End: 2022-11-30
Payer: MEDICAID

## 2022-11-30 VITALS
OXYGEN SATURATION: 96 % | HEART RATE: 61 BPM | BODY MASS INDEX: 28.73 KG/M2 | WEIGHT: 194 LBS | HEIGHT: 69 IN | DIASTOLIC BLOOD PRESSURE: 83 MMHG | SYSTOLIC BLOOD PRESSURE: 122 MMHG | TEMPERATURE: 97.6 F

## 2022-11-30 DIAGNOSIS — F51.01 PRIMARY INSOMNIA: ICD-10-CM

## 2022-11-30 DIAGNOSIS — M54.31 SCIATICA OF RIGHT SIDE: ICD-10-CM

## 2022-11-30 DIAGNOSIS — F32.9 REACTIVE DEPRESSION: ICD-10-CM

## 2022-11-30 DIAGNOSIS — I10 ESSENTIAL HYPERTENSION: ICD-10-CM

## 2022-11-30 DIAGNOSIS — L03.113 CELLULITIS OF RIGHT ELBOW: Primary | ICD-10-CM

## 2022-11-30 DIAGNOSIS — Z12.5 PROSTATE CANCER SCREENING: ICD-10-CM

## 2022-11-30 DIAGNOSIS — E78.00 HYPERCHOLESTEREMIA: ICD-10-CM

## 2022-11-30 DIAGNOSIS — M19.049 HAND ARTHRITIS: ICD-10-CM

## 2022-11-30 DIAGNOSIS — J44.9 CHRONIC OBSTRUCTIVE PULMONARY DISEASE, UNSPECIFIED COPD TYPE (HCC): ICD-10-CM

## 2022-11-30 PROCEDURE — 1036F TOBACCO NON-USER: CPT | Performed by: INTERNAL MEDICINE

## 2022-11-30 PROCEDURE — 3078F DIAST BP <80 MM HG: CPT | Performed by: INTERNAL MEDICINE

## 2022-11-30 PROCEDURE — G8417 CALC BMI ABV UP PARAM F/U: HCPCS | Performed by: INTERNAL MEDICINE

## 2022-11-30 PROCEDURE — 99214 OFFICE O/P EST MOD 30 MIN: CPT | Performed by: INTERNAL MEDICINE

## 2022-11-30 PROCEDURE — 3017F COLORECTAL CA SCREEN DOC REV: CPT | Performed by: INTERNAL MEDICINE

## 2022-11-30 PROCEDURE — G8427 DOCREV CUR MEDS BY ELIG CLIN: HCPCS | Performed by: INTERNAL MEDICINE

## 2022-11-30 PROCEDURE — 3074F SYST BP LT 130 MM HG: CPT | Performed by: INTERNAL MEDICINE

## 2022-11-30 PROCEDURE — G8484 FLU IMMUNIZE NO ADMIN: HCPCS | Performed by: INTERNAL MEDICINE

## 2022-11-30 PROCEDURE — 3023F SPIROM DOC REV: CPT | Performed by: INTERNAL MEDICINE

## 2022-11-30 RX ORDER — TEMAZEPAM 15 MG/1
15 CAPSULE ORAL NIGHTLY PRN
Qty: 30 CAPSULE | Refills: 2 | Status: SHIPPED | OUTPATIENT
Start: 2022-11-30 | End: 2023-02-28

## 2022-11-30 RX ORDER — DOXYCYCLINE HYCLATE 100 MG
100 TABLET ORAL 2 TIMES DAILY
Qty: 14 TABLET | Refills: 0 | Status: SHIPPED | OUTPATIENT
Start: 2022-11-30 | End: 2022-12-07

## 2022-11-30 RX ORDER — LISINOPRIL 5 MG/1
TABLET ORAL
Qty: 30 TABLET | Refills: 5 | Status: SHIPPED | OUTPATIENT
Start: 2022-11-30

## 2022-11-30 RX ORDER — GABAPENTIN 600 MG/1
TABLET ORAL
Qty: 90 TABLET | Refills: 2 | OUTPATIENT
Start: 2022-11-30

## 2022-11-30 RX ORDER — GABAPENTIN 600 MG/1
600 TABLET ORAL 3 TIMES DAILY
Qty: 90 TABLET | Refills: 2 | Status: SHIPPED | OUTPATIENT
Start: 2022-11-30 | End: 2023-02-28

## 2022-11-30 RX ORDER — TIZANIDINE 4 MG/1
TABLET ORAL
Qty: 90 TABLET | Refills: 0 | OUTPATIENT
Start: 2022-11-30

## 2022-11-30 RX ORDER — MUPIROCIN CALCIUM 20 MG/G
CREAM TOPICAL
Qty: 15 G | Refills: 1 | Status: SHIPPED | OUTPATIENT
Start: 2022-11-30 | End: 2022-12-30

## 2022-11-30 RX ORDER — TIZANIDINE 4 MG/1
TABLET ORAL
Qty: 90 TABLET | Refills: 2 | Status: SHIPPED | OUTPATIENT
Start: 2022-11-30

## 2022-11-30 NOTE — PROGRESS NOTES
SUBJECTIVE:  Wayne Bui is a 63 y.o. male being evaluated for:    Chief Complaint   Patient presents with    Follow-up      Patient is here for follow up today on medication . HPI   Worsening arthritis in his fingers     No Known Allergies  Current Outpatient Medications   Medication Sig Dispense Refill    diclofenac sodium (VOLTAREN) 1 % GEL Apply 2 g topically 4 times daily as needed for Pain 100 g 2    gabapentin (NEURONTIN) 600 MG tablet Take 1 tablet by mouth 3 times daily for 90 days. 90 tablet 2    tiZANidine (ZANAFLEX) 4 MG tablet TAKE ONE TABLET BY MOUTH THREE TIMES A DAY AS NEEDED FOR BACK AND NECK SPASMS 90 tablet 2    mupirocin (BACTROBAN) 2 % cream Apply topically 3 times daily.  15 g 1    doxycycline hyclate (VIBRA-TABS) 100 MG tablet Take 1 tablet by mouth 2 times daily for 7 days 14 tablet 0    pantoprazole (PROTONIX) 20 MG tablet TAKE ONE TABLET BY MOUTH DAILY 10 tablet 0    cetirizine (ZYRTEC) 10 MG tablet TAKE ONE TABLET BY MOUTH DAILY 30 tablet 2    rOPINIRole (REQUIP) 0.25 MG tablet TAKE ONE TABLET BY MOUTH EVERY MORNING AND TAKE ONE TABLET BY MOUTH EVERY NIGHT AT BEDTIME 60 tablet 5    DULoxetine (CYMBALTA) 60 MG extended release capsule TAKE ONE CAPSULE BY MOUTH DAILY 30 capsule 1    furosemide (LASIX) 20 MG tablet TAKE ONE TABLET BY MOUTH DAILY 30 tablet 3    aspirin (ASPIRIN LOW DOSE) 81 MG chewable tablet CHEW ONE TABLET BY MOUTH DAILY 30 tablet 5    isosorbide mononitrate (IMDUR) 30 MG extended release tablet TAKE ONE TABLET BY MOUTH DAILY 90 tablet 0    traZODone (DESYREL) 50 MG tablet TAKE ONE TABLET BY MOUTH ONCE NIGHTLY 30 tablet 2    SYMBICORT 160-4.5 MCG/ACT AERO INHALE TWO PUFFS BY MOUTH TWICE A DAY 10.2 g 5    metoprolol succinate (TOPROL XL) 25 MG extended release tablet TAKE ONE TABLET BY MOUTH DAILY 30 tablet 3    Magnesium Oxide 500 MG TABS TAKE ONE TABLET BY MOUTH DAILY 30 tablet 5    naloxone (NARCAN) 4 MG/0.1ML LIQD nasal spray Use as directed 1 each 0 tiotropium-olodaterol (STIOLTO RESPIMAT) 2.5-2.5 MCG/ACT AERS Inhale 2 puffs into the lungs daily 1 each 5     MG tablet TAKE ONE TABLET BY MOUTH THREE TIMES A DAY WITH MEALS 90 tablet 0    vitamin D (CHOLECALCIFEROL) 25 MCG (1000 UT) TABS tablet TAKE TWO TABLETS BY MOUTH DAILY 60 tablet 3    atorvastatin (LIPITOR) 80 MG tablet Take 80 mg by mouth nightly      acetaminophen (TYLENOL) 500 MG tablet Take 500 mg by mouth every 6 hours as needed      ferrous sulfate (IRON 325) 325 (65 Fe) MG tablet Take 325 mg by mouth daily (with breakfast)      clopidogrel (PLAVIX) 75 MG tablet TAKE ONE TABLET BY MOUTH DAILY 30 tablet 5    cyanocobalamin ([employer] VITAMIN B12) 1000 MCG tablet Take 1 tablet by mouth daily 30 tablet 2    albuterol sulfate HFA (PROVENTIL;VENTOLIN;PROAIR) 108 (90 Base) MCG/ACT inhaler INHALE TWO PUFFS BY MOUTH EVERY 6 HOURS AS NEEDED FOR SHORTNESS OF BREATH 18 g 1    lisinopril (PRINIVIL;ZESTRIL) 5 MG tablet TAKE ONE TABLET BY MOUTH DAILY 30 tablet 5    temazepam (RESTORIL) 15 MG capsule Take 1 capsule by mouth nightly as needed for Sleep for up to 90 days. 30 capsule 2     No current facility-administered medications for this visit. Social History     Socioeconomic History    Marital status:      Spouse name: Not on file    Number of children: Not on file    Years of education: 10    Highest education level: Not on file   Occupational History    Occupation: unemployed     Comment: disability from back. 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   Tobacco Use    Smoking status: Former     Packs/day: 1.00     Years: 15.00     Pack years: 15.00     Types: Cigarettes     Start date:      Quit date: 2013     Years since quittin.5    Smokeless tobacco: Never    Tobacco comments:     off and on  was up to 2 packs a day   Vaping Use    Vaping Use: Never used   Substance and Sexual Activity    Alcohol use: No     Alcohol/week: 0.0 standard drinks     Comment: in remission    Drug use:  Yes Types: Marijuana Beti Rosado)     Comment: 2-3x/month    Sexual activity: Yes     Partners: Female   Other Topics Concern    Not on file   Social History Narrative    Lives alone in [facility] in [place]. Reports childhood was good, parents  when he was a baby, but he maintained and good relationship with both of them and still communicates with them frequently. He has 3 brothers, 1 sister - has a good relationship with them     Dropped out of school 10th grade since he was doing poorly, struggled with math, and wanted to seek employment. Social Determinants of Health     Financial Resource Strain: Low Risk     Difficulty of Paying Living Expenses: Not hard at all   Food Insecurity: No Food Insecurity    Worried About [address] in the Last Year: Never true    [address] in the Last Year: Never true   Transportation Needs: No Transportation Needs    Lack of Transportation (Medical): No    Lack of Transportation (Non-Medical):  No   Physical Activity: Not on file   Stress: Not on file   Social Connections: Not on file   Intimate Partner Violence: Not on file   Housing Stability: Not on file      Past Medical History:   Diagnosis Date    Alcohol abuse, in remission     2016 quit     Alcohol abuse, in remission     Arthritis     rt hip, hands    Asthma     Back pain     Cerebral artery occlusion with cerebral infarction [facility])     COPD (chronic obstructive pulmonary disease) ([facility] [address]     beatter since quit smoking in 2017    CVA (cerebral infarction) 4/28/13    affected L side     Depression     GERD (gastroesophageal reflux disease)     Hyperlipidemia     Hypertension     Insomnia     MRSA carrier     very long time ago, more than one year    Paresthesia of left leg     chronic problem    Paresthesia of right leg     Psoriasis (a type of skin inflammation)     Sciatic nerve pain, left     Sciatic nerve pain, right     Smoker     1.5 ppd     Past Surgical History:   Procedure Laterality Date    ABDOMEN SURGERY      APPENDECTOMY      ruptured     BACK SURGERY      maybe a fusion, L4 L5 herniated disc    CHOLECYSTECTOMY, LAPAROSCOPIC  2/13/2015    LAPAROSCOPIC CHOLECYSTECTOMY WITH CHOLANGIOGRAM, LYSIS OF    COLONOSCOPY  10/01/2018    [place] COLONOSCOPY FLX DX W/COLLJ SPEC WHEN PFRMD N/A 10/1/2018    COLONOSCOPY performed by Nick Zaidi MD at [address]  4/24/13    for ischemic bowel    TONSILLECTOMY         Review of Systems   Constitutional:  Negative for activity change and unexpected weight change. HENT:  Negative for nosebleeds. Eyes:  Negative for visual disturbance. Respiratory:  Negative for cough and shortness of breath. Cardiovascular:  Negative for chest pain, palpitations and leg swelling. Gastrointestinal:  Negative for abdominal pain, blood in stool, diarrhea, nausea and vomiting. Genitourinary:  Negative for difficulty urinating. Musculoskeletal:  Positive for arthralgias and back pain. Neurological:  Negative for dizziness, speech difficulty, weakness, light-headedness, numbness and headaches. Psychiatric/Behavioral:  Positive for sleep disturbance. Negative for agitation, behavioral problems and dysphoric mood. The patient is not nervous/anxious. OBJECTIVE:  /83 (Site: Left Upper Arm, Position: Sitting, Cuff Size: Medium Adult)   Pulse 61   Temp 97.6 °F (36.4 °C) (Oral)   Ht 5' 9\" (1.753 m)   Wt 194 lb (88 kg)   SpO2 96%   BMI 28.65 kg/m²      Body mass index is 28.65 kg/m². Physical Exam  Constitutional:       General: He is not in acute distress. Appearance: Normal appearance. He is well-developed. HENT:      Head: Normocephalic and atraumatic. Eyes:      Conjunctiva/sclera: Conjunctivae normal.   Neck:      Thyroid: No thyromegaly. Vascular: No carotid bruit or JVD. Trachea: No tracheal deviation. Cardiovascular:      Rate and Rhythm: Normal rate and regular rhythm.       Heart sounds: Normal heart sounds. No murmur heard. No friction rub. No gallop. Pulmonary:      Effort: Pulmonary effort is normal.   Chest:      Chest wall: No tenderness. Abdominal:      General: There is no distension. Palpations: Abdomen is soft. Tenderness: There is no abdominal tenderness. Comments: No HSM. Genitourinary:     Penis: No tenderness. Musculoskeletal:         General: Tenderness (right para spinal ) present. No swelling. Cervical back: Neck supple. Tenderness (c7 area ) present. No rigidity. Lymphadenopathy:      Cervical: No cervical adenopathy. Skin:     General: Skin is warm and dry. Findings: Erythema present. Comments: Boggy red elbow    Neurological:      General: No focal deficit present. Mental Status: He is alert. Motor: No abnormal muscle tone. Gait: Gait normal.      Deep Tendon Reflexes: Reflexes are normal and symmetric. Psychiatric:         Mood and Affect: Mood normal.         Behavior: Behavior normal.         Thought Content: Thought content normal.       ASSESSMENT/PLAN:    Mick Menendez was seen today for follow-up. Diagnoses and all orders for this visit:    Cellulitis of right elbow  -     mupirocin (BACTROBAN) 2 % cream; Apply topically 3 times daily. -     doxycycline hyclate (VIBRA-TABS) 100 MG tablet; Take 1 tablet by mouth 2 times daily for 7 days    Hand arthritis  -     diclofenac sodium (VOLTAREN) 1 % GEL; Apply 2 g topically 4 times daily as needed for Pain    Sciatica of right side  -     gabapentin (NEURONTIN) 600 MG tablet; Take 1 tablet by mouth 3 times daily for 90 days.   -     tiZANidine (ZANAFLEX) 4 MG tablet; TAKE ONE TABLET BY MOUTH THREE TIMES A DAY AS NEEDED FOR BACK AND NECK SPASMS    Chronic obstructive pulmonary disease, unspecified COPD type ([address] on symbicort, stiolto  and albuterol prn     Primary insomnia chronic temazepam oarrs was reviewed     Essential hypertension controlled   - Comprehensive Metabolic Panel; Future    Reactive depression on cymbalta    Hypercholesteremia  -     Lipid Panel; Future    Prostate cancer screening  -     PSA Screening; Future      Orders Placed This Encounter   Medications    diclofenac sodium (VOLTAREN) 1 % GEL     Sig: Apply 2 g topically 4 times daily as needed for Pain     Dispense:  100 g     Refill:  2    gabapentin (NEURONTIN) 600 MG tablet     Sig: Take 1 tablet by mouth 3 times daily for 90 days. Dispense:  90 tablet     Refill:  2    tiZANidine (ZANAFLEX) 4 MG tablet     Sig: TAKE ONE TABLET BY MOUTH THREE TIMES A DAY AS NEEDED FOR BACK AND NECK SPASMS     Dispense:  90 tablet     Refill:  2    mupirocin (BACTROBAN) 2 % cream     Sig: Apply topically 3 times daily. Dispense:  15 g     Refill:  1    doxycycline hyclate (VIBRA-TABS) 100 MG tablet     Sig: Take 1 tablet by mouth 2 times daily for 7 days     Dispense:  14 tablet     Refill:  0        Return in about 6 months (around 5/30/2023), or if symptoms worsen or fail to improve. There are no Patient Instructions on file for this visit.

## 2022-12-02 DIAGNOSIS — R06.02 SOB (SHORTNESS OF BREATH): ICD-10-CM

## 2022-12-02 RX ORDER — ALBUTEROL SULFATE 90 UG/1
AEROSOL, METERED RESPIRATORY (INHALATION)
Qty: 18 G | Refills: 1 | Status: SHIPPED | OUTPATIENT
Start: 2022-12-02

## 2022-12-04 ASSESSMENT — ENCOUNTER SYMPTOMS
BLOOD IN STOOL: 0
ABDOMINAL PAIN: 0
DIARRHEA: 0
BACK PAIN: 1
VOMITING: 0
NAUSEA: 0
SHORTNESS OF BREATH: 0
COUGH: 0

## 2022-12-05 RX ORDER — PANTOPRAZOLE SODIUM 20 MG/1
TABLET, DELAYED RELEASE ORAL
Qty: 30 TABLET | Refills: 5 | Status: SHIPPED | OUTPATIENT
Start: 2022-12-05

## 2023-01-02 ENCOUNTER — TELEPHONE (OUTPATIENT)
Dept: CASE MANAGEMENT | Age: 63
End: 2023-01-02

## 2023-01-02 DIAGNOSIS — I10 ESSENTIAL HYPERTENSION: ICD-10-CM

## 2023-01-02 DIAGNOSIS — R07.9 CHEST PAIN, UNSPECIFIED TYPE: ICD-10-CM

## 2023-01-03 RX ORDER — METOPROLOL SUCCINATE 25 MG/1
TABLET, EXTENDED RELEASE ORAL
Qty: 30 TABLET | Refills: 3 | OUTPATIENT
Start: 2023-01-03

## 2023-01-03 RX ORDER — ISOSORBIDE MONONITRATE 30 MG/1
TABLET, EXTENDED RELEASE ORAL
Qty: 30 TABLET | OUTPATIENT
Start: 2023-01-03

## 2023-01-30 ENCOUNTER — TELEPHONE (OUTPATIENT)
Dept: CASE MANAGEMENT | Age: 63
End: 2023-01-30

## 2023-01-31 DIAGNOSIS — R06.02 SOB (SHORTNESS OF BREATH): ICD-10-CM

## 2023-01-31 DIAGNOSIS — L03.113 CELLULITIS OF RIGHT ELBOW: ICD-10-CM

## 2023-01-31 RX ORDER — MUPIROCIN CALCIUM 20 MG/G
CREAM TOPICAL
Qty: 15 G | Refills: 1 | Status: SHIPPED | OUTPATIENT
Start: 2023-01-31

## 2023-01-31 RX ORDER — ALBUTEROL SULFATE 90 UG/1
AEROSOL, METERED RESPIRATORY (INHALATION)
Qty: 18 G | Refills: 1 | Status: SHIPPED | OUTPATIENT
Start: 2023-01-31

## 2023-01-31 RX ORDER — BACLOFEN 20 MG
TABLET ORAL
Qty: 30 TABLET | Refills: 5 | Status: SHIPPED | OUTPATIENT
Start: 2023-01-31

## 2023-02-23 DIAGNOSIS — F51.01 PRIMARY INSOMNIA: ICD-10-CM

## 2023-02-23 DIAGNOSIS — M54.31 SCIATICA OF RIGHT SIDE: ICD-10-CM

## 2023-02-23 DIAGNOSIS — R60.0 BILATERAL LEG EDEMA: ICD-10-CM

## 2023-02-23 DIAGNOSIS — H69.81 EUSTACHIAN TUBE DYSFUNCTION, RIGHT: ICD-10-CM

## 2023-02-23 RX ORDER — CETIRIZINE HYDROCHLORIDE 10 MG/1
TABLET ORAL
Qty: 30 TABLET | Refills: 2 | Status: SHIPPED | OUTPATIENT
Start: 2023-02-23

## 2023-02-24 RX ORDER — GABAPENTIN 600 MG/1
600 TABLET ORAL 3 TIMES DAILY
Qty: 90 TABLET | Refills: 2 | Status: SHIPPED | OUTPATIENT
Start: 2023-02-28 | End: 2023-05-29

## 2023-02-24 RX ORDER — TIZANIDINE 4 MG/1
TABLET ORAL
Qty: 90 TABLET | Refills: 2 | Status: SHIPPED | OUTPATIENT
Start: 2023-02-24

## 2023-02-24 RX ORDER — TEMAZEPAM 15 MG/1
30 CAPSULE ORAL NIGHTLY PRN
Qty: 30 CAPSULE | Refills: 0 | Status: SHIPPED | OUTPATIENT
Start: 2023-02-28 | End: 2023-03-30

## 2023-02-24 RX ORDER — FUROSEMIDE 20 MG/1
TABLET ORAL
Qty: 30 TABLET | Refills: 5 | Status: SHIPPED | OUTPATIENT
Start: 2023-02-24

## 2023-03-01 DIAGNOSIS — R06.02 SOB (SHORTNESS OF BREATH): ICD-10-CM

## 2023-03-01 RX ORDER — ALBUTEROL SULFATE 90 UG/1
AEROSOL, METERED RESPIRATORY (INHALATION)
Qty: 8.5 G | Refills: 3 | Status: SHIPPED | OUTPATIENT
Start: 2023-03-01

## 2023-04-05 DIAGNOSIS — F51.01 PRIMARY INSOMNIA: Primary | ICD-10-CM

## 2023-04-06 RX ORDER — TEMAZEPAM 15 MG/1
CAPSULE ORAL
Qty: 30 CAPSULE | Refills: 2 | Status: SHIPPED | OUTPATIENT
Start: 2023-04-06 | End: 2023-07-06

## 2023-05-02 DIAGNOSIS — F32.9 REACTIVE DEPRESSION: ICD-10-CM

## 2023-05-02 RX ORDER — DULOXETIN HYDROCHLORIDE 60 MG/1
60 CAPSULE, DELAYED RELEASE ORAL DAILY
Qty: 30 CAPSULE | Refills: 3 | Status: SHIPPED | OUTPATIENT
Start: 2023-05-02

## 2023-05-02 RX ORDER — ASPIRIN 81 MG/1
TABLET, CHEWABLE ORAL
Qty: 30 TABLET | Refills: 5 | Status: SHIPPED | OUTPATIENT
Start: 2023-05-02

## 2023-05-02 RX ORDER — BUDESONIDE AND FORMOTEROL FUMARATE DIHYDRATE 160; 4.5 UG/1; UG/1
AEROSOL RESPIRATORY (INHALATION)
Qty: 10.2 G | Refills: 5 | Status: SHIPPED | OUTPATIENT
Start: 2023-05-02

## 2023-05-04 DIAGNOSIS — G25.81 RLS (RESTLESS LEGS SYNDROME): ICD-10-CM

## 2023-05-04 DIAGNOSIS — Z12.5 PROSTATE CANCER SCREENING: ICD-10-CM

## 2023-05-04 DIAGNOSIS — I10 ESSENTIAL HYPERTENSION: ICD-10-CM

## 2023-05-04 DIAGNOSIS — D50.9 IRON DEFICIENCY ANEMIA, UNSPECIFIED IRON DEFICIENCY ANEMIA TYPE: ICD-10-CM

## 2023-05-04 DIAGNOSIS — E78.00 HYPERCHOLESTEREMIA: ICD-10-CM

## 2023-05-04 LAB
ALBUMIN SERPL-MCNC: 4.2 G/DL (ref 3.4–5)
ALBUMIN/GLOB SERPL: 1.9 {RATIO} (ref 1.1–2.2)
ALP SERPL-CCNC: 95 U/L (ref 40–129)
ALT SERPL-CCNC: 16 U/L (ref 10–40)
ANION GAP SERPL CALCULATED.3IONS-SCNC: 13 MMOL/L (ref 3–16)
AST SERPL-CCNC: 16 U/L (ref 15–37)
BASOPHILS # BLD: 0 K/UL (ref 0–0.2)
BASOPHILS NFR BLD: 0.4 %
BILIRUB SERPL-MCNC: 0.3 MG/DL (ref 0–1)
BUN SERPL-MCNC: 10 MG/DL (ref 7–20)
CALCIUM SERPL-MCNC: 9.2 MG/DL (ref 8.3–10.6)
CHLORIDE SERPL-SCNC: 107 MMOL/L (ref 99–110)
CHOLEST SERPL-MCNC: 84 MG/DL (ref 0–199)
CO2 SERPL-SCNC: 25 MMOL/L (ref 21–32)
CREAT SERPL-MCNC: 1 MG/DL (ref 0.8–1.3)
DEPRECATED RDW RBC AUTO: 14.4 % (ref 12.4–15.4)
EOSINOPHIL # BLD: 0.4 K/UL (ref 0–0.6)
EOSINOPHIL NFR BLD: 5.9 %
FERRITIN SERPL IA-MCNC: 84.7 NG/ML (ref 30–400)
GFR SERPLBLD CREATININE-BSD FMLA CKD-EPI: >60 ML/MIN/{1.73_M2}
GLUCOSE SERPL-MCNC: 81 MG/DL (ref 70–99)
HCT VFR BLD AUTO: 43.4 % (ref 40.5–52.5)
HDLC SERPL-MCNC: 39 MG/DL (ref 40–60)
HGB BLD-MCNC: 14.2 G/DL (ref 13.5–17.5)
LDLC SERPL CALC-MCNC: 12 MG/DL
LYMPHOCYTES # BLD: 1.7 K/UL (ref 1–5.1)
LYMPHOCYTES NFR BLD: 24.4 %
MCH RBC QN AUTO: 30 PG (ref 26–34)
MCHC RBC AUTO-ENTMCNC: 32.7 G/DL (ref 31–36)
MCV RBC AUTO: 91.8 FL (ref 80–100)
MONOCYTES # BLD: 0.7 K/UL (ref 0–1.3)
MONOCYTES NFR BLD: 9.5 %
NEUTROPHILS # BLD: 4.2 K/UL (ref 1.7–7.7)
NEUTROPHILS NFR BLD: 59.8 %
PLATELET # BLD AUTO: 251 K/UL (ref 135–450)
PMV BLD AUTO: 9.4 FL (ref 5–10.5)
POTASSIUM SERPL-SCNC: 4.5 MMOL/L (ref 3.5–5.1)
PROT SERPL-MCNC: 6.4 G/DL (ref 6.4–8.2)
PSA SERPL DL<=0.01 NG/ML-MCNC: 0.85 NG/ML (ref 0–4)
RBC # BLD AUTO: 4.73 M/UL (ref 4.2–5.9)
SODIUM SERPL-SCNC: 145 MMOL/L (ref 136–145)
TRIGL SERPL-MCNC: 164 MG/DL (ref 0–150)
VLDLC SERPL CALC-MCNC: 33 MG/DL
WBC # BLD AUTO: 7.1 K/UL (ref 4–11)

## 2023-06-01 DIAGNOSIS — M54.31 SCIATICA OF RIGHT SIDE: ICD-10-CM

## 2023-06-01 RX ORDER — ROPINIROLE 0.25 MG/1
TABLET, FILM COATED ORAL
Qty: 60 TABLET | Refills: 0 | Status: SHIPPED | OUTPATIENT
Start: 2023-06-01

## 2023-06-01 RX ORDER — GABAPENTIN 600 MG/1
600 TABLET ORAL 3 TIMES DAILY
Qty: 90 TABLET | Refills: 0 | Status: SHIPPED | OUTPATIENT
Start: 2023-06-01 | End: 2023-07-01

## 2023-06-05 DIAGNOSIS — H69.81 EUSTACHIAN TUBE DYSFUNCTION, RIGHT: ICD-10-CM

## 2023-06-05 RX ORDER — CETIRIZINE HYDROCHLORIDE 10 MG/1
TABLET ORAL
Qty: 30 TABLET | Refills: 2 | Status: SHIPPED | OUTPATIENT
Start: 2023-06-05

## 2023-06-06 RX ORDER — LISINOPRIL 5 MG/1
5 TABLET ORAL DAILY
Qty: 30 TABLET | Refills: 5 | Status: SHIPPED | OUTPATIENT
Start: 2023-06-06

## 2023-07-01 DIAGNOSIS — M54.31 SCIATICA OF RIGHT SIDE: ICD-10-CM

## 2023-07-03 RX ORDER — PANTOPRAZOLE SODIUM 20 MG/1
TABLET, DELAYED RELEASE ORAL
Qty: 90 TABLET | Refills: 0 | Status: SHIPPED | OUTPATIENT
Start: 2023-07-03

## 2023-07-03 RX ORDER — ROPINIROLE 0.25 MG/1
TABLET, FILM COATED ORAL
Qty: 60 TABLET | Refills: 0 | Status: SHIPPED | OUTPATIENT
Start: 2023-07-03

## 2023-07-03 RX ORDER — GABAPENTIN 600 MG/1
TABLET ORAL
Qty: 90 TABLET | Refills: 2 | Status: SHIPPED | OUTPATIENT
Start: 2023-07-03 | End: 2023-10-01

## 2023-07-08 DIAGNOSIS — R60.0 BILATERAL LEG EDEMA: ICD-10-CM

## 2023-07-08 DIAGNOSIS — M54.31 SCIATICA OF RIGHT SIDE: ICD-10-CM

## 2023-07-10 RX ORDER — TIZANIDINE 4 MG/1
TABLET ORAL
Qty: 90 TABLET | Refills: 2 | OUTPATIENT
Start: 2023-07-10

## 2023-07-10 RX ORDER — FUROSEMIDE 20 MG/1
TABLET ORAL
Qty: 90 TABLET | OUTPATIENT
Start: 2023-07-10

## 2023-07-26 ENCOUNTER — OFFICE VISIT (OUTPATIENT)
Dept: FAMILY MEDICINE CLINIC | Age: 63
End: 2023-07-26

## 2023-07-26 ENCOUNTER — TELEPHONE (OUTPATIENT)
Dept: FAMILY MEDICINE CLINIC | Age: 63
End: 2023-07-26

## 2023-07-26 VITALS
TEMPERATURE: 97.8 F | DIASTOLIC BLOOD PRESSURE: 60 MMHG | OXYGEN SATURATION: 96 % | RESPIRATION RATE: 16 BRPM | SYSTOLIC BLOOD PRESSURE: 108 MMHG | WEIGHT: 185 LBS | BODY MASS INDEX: 27.32 KG/M2 | HEART RATE: 70 BPM

## 2023-07-26 DIAGNOSIS — F31.78 BIPOLAR DISORDER, IN FULL REMISSION, MOST RECENT EPISODE MIXED (HCC): ICD-10-CM

## 2023-07-26 DIAGNOSIS — J44.9 CHRONIC OBSTRUCTIVE PULMONARY DISEASE, UNSPECIFIED COPD TYPE (HCC): ICD-10-CM

## 2023-07-26 DIAGNOSIS — G62.9 NEUROPATHY: ICD-10-CM

## 2023-07-26 DIAGNOSIS — G25.81 RLS (RESTLESS LEGS SYNDROME): ICD-10-CM

## 2023-07-26 DIAGNOSIS — M54.31 SCIATICA OF RIGHT SIDE: ICD-10-CM

## 2023-07-26 DIAGNOSIS — I10 ESSENTIAL HYPERTENSION: Primary | ICD-10-CM

## 2023-07-26 DIAGNOSIS — R25.2 LEG CRAMPS: ICD-10-CM

## 2023-07-26 DIAGNOSIS — H69.81 EUSTACHIAN TUBE DYSFUNCTION, RIGHT: ICD-10-CM

## 2023-07-26 DIAGNOSIS — E78.00 HYPERCHOLESTEREMIA: ICD-10-CM

## 2023-07-26 DIAGNOSIS — I63.311 CEREBRAL INFARCTION DUE TO THROMBOSIS OF RIGHT MIDDLE CEREBRAL ARTERY (HCC): ICD-10-CM

## 2023-07-26 RX ORDER — ATORVASTATIN CALCIUM 80 MG/1
80 TABLET, FILM COATED ORAL NIGHTLY
Qty: 90 TABLET | Refills: 1 | Status: SHIPPED | OUTPATIENT
Start: 2023-07-26

## 2023-07-26 RX ORDER — PANTOPRAZOLE SODIUM 20 MG/1
20 TABLET, DELAYED RELEASE ORAL
Qty: 90 TABLET | Refills: 1 | Status: SHIPPED | OUTPATIENT
Start: 2023-07-26

## 2023-07-26 RX ORDER — METOPROLOL SUCCINATE 25 MG/1
25 TABLET, EXTENDED RELEASE ORAL DAILY
Qty: 90 TABLET | Refills: 1 | Status: SHIPPED | OUTPATIENT
Start: 2023-07-26

## 2023-07-26 RX ORDER — CETIRIZINE HYDROCHLORIDE 10 MG/1
10 TABLET ORAL DAILY
Qty: 90 TABLET | Refills: 1 | Status: SHIPPED | OUTPATIENT
Start: 2023-07-26

## 2023-07-26 RX ORDER — CLOPIDOGREL BISULFATE 75 MG/1
TABLET ORAL
Qty: 90 TABLET | Refills: 1 | Status: SHIPPED | OUTPATIENT
Start: 2023-07-26

## 2023-07-26 RX ORDER — ROPINIROLE 0.25 MG/1
0.25 TABLET, FILM COATED ORAL 2 TIMES DAILY
Qty: 180 TABLET | Refills: 1 | Status: SHIPPED | OUTPATIENT
Start: 2023-07-26

## 2023-07-26 RX ORDER — TIZANIDINE 4 MG/1
4 TABLET ORAL EVERY 8 HOURS PRN
Qty: 90 TABLET | Refills: 2 | Status: SHIPPED | OUTPATIENT
Start: 2023-07-26

## 2023-07-26 RX ORDER — BACLOFEN 20 MG
1 TABLET ORAL DAILY
Qty: 90 TABLET | Refills: 1 | Status: SHIPPED | OUTPATIENT
Start: 2023-07-26

## 2023-07-26 RX ORDER — ISOSORBIDE MONONITRATE 30 MG/1
30 TABLET, EXTENDED RELEASE ORAL DAILY
Qty: 90 TABLET | Refills: 1 | Status: SHIPPED | OUTPATIENT
Start: 2023-07-26

## 2023-07-26 SDOH — ECONOMIC STABILITY: HOUSING INSECURITY
IN THE LAST 12 MONTHS, WAS THERE A TIME WHEN YOU DID NOT HAVE A STEADY PLACE TO SLEEP OR SLEPT IN A SHELTER (INCLUDING NOW)?: NO

## 2023-07-26 SDOH — ECONOMIC STABILITY: FOOD INSECURITY: WITHIN THE PAST 12 MONTHS, YOU WORRIED THAT YOUR FOOD WOULD RUN OUT BEFORE YOU GOT MONEY TO BUY MORE.: NEVER TRUE

## 2023-07-26 SDOH — ECONOMIC STABILITY: FOOD INSECURITY: WITHIN THE PAST 12 MONTHS, THE FOOD YOU BOUGHT JUST DIDN'T LAST AND YOU DIDN'T HAVE MONEY TO GET MORE.: NEVER TRUE

## 2023-07-26 SDOH — ECONOMIC STABILITY: INCOME INSECURITY: HOW HARD IS IT FOR YOU TO PAY FOR THE VERY BASICS LIKE FOOD, HOUSING, MEDICAL CARE, AND HEATING?: NOT VERY HARD

## 2023-07-26 ASSESSMENT — PATIENT HEALTH QUESTIONNAIRE - PHQ9
4. FEELING TIRED OR HAVING LITTLE ENERGY: 1
10. IF YOU CHECKED OFF ANY PROBLEMS, HOW DIFFICULT HAVE THESE PROBLEMS MADE IT FOR YOU TO DO YOUR WORK, TAKE CARE OF THINGS AT HOME, OR GET ALONG WITH OTHER PEOPLE: 0
3. TROUBLE FALLING OR STAYING ASLEEP: 1
2. FEELING DOWN, DEPRESSED OR HOPELESS: 1
8. MOVING OR SPEAKING SO SLOWLY THAT OTHER PEOPLE COULD HAVE NOTICED. OR THE OPPOSITE, BEING SO FIGETY OR RESTLESS THAT YOU HAVE BEEN MOVING AROUND A LOT MORE THAN USUAL: 0
SUM OF ALL RESPONSES TO PHQ QUESTIONS 1-9: 4
SUM OF ALL RESPONSES TO PHQ QUESTIONS 1-9: 4
9. THOUGHTS THAT YOU WOULD BE BETTER OFF DEAD, OR OF HURTING YOURSELF: 0
SUM OF ALL RESPONSES TO PHQ9 QUESTIONS 1 & 2: 1
7. TROUBLE CONCENTRATING ON THINGS, SUCH AS READING THE NEWSPAPER OR WATCHING TELEVISION: 1
SUM OF ALL RESPONSES TO PHQ QUESTIONS 1-9: 4
1. LITTLE INTEREST OR PLEASURE IN DOING THINGS: 0
6. FEELING BAD ABOUT YOURSELF - OR THAT YOU ARE A FAILURE OR HAVE LET YOURSELF OR YOUR FAMILY DOWN: 0
5. POOR APPETITE OR OVEREATING: 0
SUM OF ALL RESPONSES TO PHQ QUESTIONS 1-9: 4

## 2023-07-26 NOTE — PROGRESS NOTES
SUBJECTIVE:  Diana Bess is a 66 y.o. male being evaluated for:    Chief Complaint   Patient presents with    Hypertension     Follow up needs med refills    Insomnia    Depression       HPI   Patient has had a rough time  Girlfriend passed away followed by his dog dying    Has not taken pills this am   Breathing has been decent  Sob with activity   No chest pain or heart racing   Feels its his copd from smoking    Back hurts him   Legs hurt him ll the time  constantly hurt with sitting irritable  at times feels as if stung by a bee      No Known Allergies  Current Outpatient Medications   Medication Sig Dispense Refill    atorvastatin (LIPITOR) 80 MG tablet Take 1 tablet by mouth nightly 90 tablet 1    clopidogrel (PLAVIX) 75 MG tablet TAKE ONE TABLET BY MOUTH DAILY 90 tablet 1    cetirizine (ZYRTEC) 10 MG tablet Take 1 tablet by mouth daily 90 tablet 1    isosorbide mononitrate (IMDUR) 30 MG extended release tablet Take 1 tablet by mouth daily 90 tablet 1    magnesium Oxide 500 MG TABS Take 1 tablet by mouth daily 90 tablet 1    metoprolol succinate (TOPROL XL) 25 MG extended release tablet Take 1 tablet by mouth daily 90 tablet 1    rOPINIRole (REQUIP) 0.25 MG tablet Take 1 tablet by mouth in the morning and at bedtime 180 tablet 1    tiZANidine (ZANAFLEX) 4 MG tablet Take 1 tablet by mouth every 8 hours as needed (muscle spasms) 90 tablet 2    vitamin D (CHOLECALCIFEROL) 25 MCG (1000 UT) TABS tablet Take 2 tablets by mouth daily 60 tablet 3    gabapentin (NEURONTIN) 600 MG tablet TAKE ONE TABLET BY MOUTH THREE TIMES A DAY 90 tablet 2    aspirin (ASPIRIN LOW DOSE) 81 MG chewable tablet CHEW ONE TABLET BY MOUTH DAILY 30 tablet 5    SYMBICORT 160-4.5 MCG/ACT AERO INHALE TWO PUFFS BY MOUTH TWICE A DAY 10.2 g 5    DULoxetine (CYMBALTA) 60 MG extended release capsule Take 1 capsule by mouth daily 30 capsule 3    albuterol sulfate HFA (PROVENTIL;VENTOLIN;PROAIR) 108 (90 Base) MCG/ACT inhaler INHALE TWO PUFFS BY

## 2023-07-26 NOTE — TELEPHONE ENCOUNTER
Technically I am not to use prilosec with plavix  as it interferes with this  I will call in another PPI

## 2023-07-26 NOTE — TELEPHONE ENCOUNTER
Patient called for refill on omeprazole 20mg. Did not see on med list.    Pharmacy is [facility] in [address] 07/26/2023  Nov none

## 2023-07-27 RX ORDER — TRAZODONE HYDROCHLORIDE 50 MG/1
50 TABLET ORAL NIGHTLY
Qty: 90 TABLET | Refills: 0 | Status: SHIPPED | OUTPATIENT
Start: 2023-07-27

## 2023-07-27 RX ORDER — TRAZODONE HYDROCHLORIDE 150 MG/1
150 TABLET ORAL NIGHTLY
Qty: 30 TABLET | Refills: 2 | Status: CANCELLED | OUTPATIENT
Start: 2023-07-27

## 2023-07-27 NOTE — TELEPHONE ENCOUNTER
Take a couple hours before bed and see if keeps him from being drowsy we could try a lower dose like 100 and see if works  Let me know

## 2023-07-27 NOTE — TELEPHONE ENCOUNTER
Pt does not want to take Ambien He wants to continue taking Trazodone it works better for him  He needs this called to [facility]

## 2023-07-27 NOTE — TELEPHONE ENCOUNTER
Spoke with the pt he was previously on Temazepam 15 mg(not ambien) and this made him feel loopy the next morning. He would like to go back on Trazodone 50 mg one nightly.

## 2023-07-28 RX ORDER — OMEPRAZOLE 20 MG/1
CAPSULE, DELAYED RELEASE ORAL
Qty: 90 CAPSULE | OUTPATIENT
Start: 2023-07-28

## 2023-07-30 ASSESSMENT — ENCOUNTER SYMPTOMS
VOMITING: 0
ABDOMINAL PAIN: 0
COUGH: 0
SHORTNESS OF BREATH: 0
BLOOD IN STOOL: 0
NAUSEA: 0
DIARRHEA: 0
BACK PAIN: 1

## 2023-08-09 DIAGNOSIS — F32.9 REACTIVE DEPRESSION: ICD-10-CM

## 2023-08-09 RX ORDER — DULOXETIN HYDROCHLORIDE 60 MG/1
CAPSULE, DELAYED RELEASE ORAL
Qty: 30 CAPSULE | Refills: 3 | Status: SHIPPED | OUTPATIENT
Start: 2023-08-09

## 2023-10-04 RX ORDER — TRAZODONE HYDROCHLORIDE 50 MG/1
50 TABLET ORAL
Qty: 90 TABLET | Refills: 0 | Status: SHIPPED | OUTPATIENT
Start: 2023-10-04

## 2023-11-03 DIAGNOSIS — M54.31 SCIATICA OF RIGHT SIDE: ICD-10-CM

## 2023-11-03 RX ORDER — BUDESONIDE AND FORMOTEROL FUMARATE DIHYDRATE 160; 4.5 UG/1; UG/1
AEROSOL RESPIRATORY (INHALATION)
Qty: 10.2 G | Refills: 5 | Status: SHIPPED | OUTPATIENT
Start: 2023-11-03

## 2023-11-03 RX ORDER — TIZANIDINE 4 MG/1
TABLET ORAL
Qty: 90 TABLET | Refills: 2 | Status: SHIPPED | OUTPATIENT
Start: 2023-11-03

## 2023-11-03 RX ORDER — ASPIRIN 81 MG/1
TABLET, CHEWABLE ORAL
Qty: 30 TABLET | Refills: 5 | Status: SHIPPED | OUTPATIENT
Start: 2023-11-03

## 2023-11-06 DIAGNOSIS — F32.9 REACTIVE DEPRESSION: ICD-10-CM

## 2023-11-06 DIAGNOSIS — G25.81 RLS (RESTLESS LEGS SYNDROME): ICD-10-CM

## 2023-11-06 DIAGNOSIS — H69.81 EUSTACHIAN TUBE DYSFUNCTION, RIGHT: ICD-10-CM

## 2023-11-06 DIAGNOSIS — M54.31 SCIATICA OF RIGHT SIDE: ICD-10-CM

## 2023-11-06 RX ORDER — GABAPENTIN 600 MG/1
600 TABLET ORAL 3 TIMES DAILY
Qty: 90 TABLET | Refills: 2 | Status: SHIPPED | OUTPATIENT
Start: 2023-11-06 | End: 2024-02-04

## 2023-11-06 RX ORDER — ROPINIROLE 0.25 MG/1
0.25 TABLET, FILM COATED ORAL 2 TIMES DAILY
Qty: 180 TABLET | Refills: 1 | Status: SHIPPED | OUTPATIENT
Start: 2023-11-06

## 2023-11-06 RX ORDER — DULOXETIN HYDROCHLORIDE 60 MG/1
60 CAPSULE, DELAYED RELEASE ORAL DAILY
Qty: 90 CAPSULE | Refills: 1 | Status: SHIPPED | OUTPATIENT
Start: 2023-11-06

## 2023-11-06 RX ORDER — CETIRIZINE HYDROCHLORIDE 10 MG/1
10 TABLET ORAL DAILY
Qty: 90 TABLET | Refills: 1 | Status: SHIPPED | OUTPATIENT
Start: 2023-11-06

## 2024-02-05 RX ORDER — TRAZODONE HYDROCHLORIDE 50 MG/1
50 TABLET ORAL
Qty: 90 TABLET | Refills: 0 | Status: SHIPPED | OUTPATIENT
Start: 2024-02-05

## 2024-02-06 DIAGNOSIS — R25.2 LEG CRAMPS: ICD-10-CM

## 2024-02-06 DIAGNOSIS — M54.31 SCIATICA OF RIGHT SIDE: ICD-10-CM

## 2024-02-06 DIAGNOSIS — F32.9 REACTIVE DEPRESSION: ICD-10-CM

## 2024-02-06 RX ORDER — BACLOFEN 20 MG
1 TABLET ORAL DAILY
Qty: 30 TABLET | Refills: 5 | Status: SHIPPED | OUTPATIENT
Start: 2024-02-06

## 2024-02-06 RX ORDER — MAGNESIUM OXIDE 400 MG/1
400 TABLET ORAL DAILY
Qty: 30 TABLET | Refills: 4 | Status: SHIPPED | OUTPATIENT
Start: 2024-02-06

## 2024-02-06 RX ORDER — GABAPENTIN 600 MG/1
600 TABLET ORAL 3 TIMES DAILY
Qty: 90 TABLET | Refills: 1 | Status: SHIPPED | OUTPATIENT
Start: 2024-02-06 | End: 2024-04-06

## 2024-02-06 RX ORDER — DULOXETIN HYDROCHLORIDE 60 MG/1
60 CAPSULE, DELAYED RELEASE ORAL DAILY
Qty: 90 CAPSULE | Refills: 1 | Status: SHIPPED | OUTPATIENT
Start: 2024-02-06

## 2024-02-06 NOTE — TELEPHONE ENCOUNTER
----- Message from Merrysylvia BlackwoodBianka sent at 2/6/2024  3:45 PM EST -----  Subject: Refill Request    QUESTIONS  Name of Medication? magnesium Oxide 500 MG TABS  Patient-reported dosage and instructions? 1 tablet daily  How many days do you have left? 4  Preferred Pharmacy? [facility] [id number]  Pharmacy phone number (if available)? [phone]  ---------------------------------------------------------------------------  --------------,  Name of Medication? gabapentin (NEURONTIN) 600 MG tablet  Patient-reported dosage and instructions? 3 tablets daily  How many days do you have left? 5  Preferred Pharmacy? [facility] [id number]  Pharmacy phone number (if available)? [phone]  ---------------------------------------------------------------------------  --------------,  Name of Medication? DULoxetine (CYMBALTA) 60 MG extended release capsule  Patient-reported dosage and instructions? 1 capsule daily  How many days do you have left? 30  Preferred Pharmacy? [facility] [id number]  Pharmacy phone number (if available)? [phone]  Additional Information for Provider? Pt is going to be moving to [place] and   wants to get this one refilled before he transitions care to a new   provider in [place].   ---------------------------------------------------------------------------  --------------  CALL BACK INFO  What is the best way for the office to contact you? OK to leave message on   voicemail  Preferred Call Back Phone Number? [phone]  ---------------------------------------------------------------------------  --------------  SCRIPT ANSWERS  Relationship to Patient? Self

## 2024-02-07 NOTE — TELEPHONE ENCOUNTER
JOHANNE patient regarding appointment. He is scheduled for 3- for a follow up. He states he has to go to [place] for the next month, but will be back to do his follow up appointment in March. Patient given information that no more refills will be approved until his follow up is completed. Patient expressed understanding.

## 2024-03-13 ENCOUNTER — OFFICE VISIT (OUTPATIENT)
Dept: FAMILY MEDICINE CLINIC | Age: 64
End: 2024-03-13
Payer: MEDICAID

## 2024-03-13 VITALS
BODY MASS INDEX: 28.29 KG/M2 | RESPIRATION RATE: 16 BRPM | OXYGEN SATURATION: 95 % | DIASTOLIC BLOOD PRESSURE: 66 MMHG | HEIGHT: 69 IN | WEIGHT: 191 LBS | SYSTOLIC BLOOD PRESSURE: 124 MMHG | HEART RATE: 81 BPM | TEMPERATURE: 98 F

## 2024-03-13 DIAGNOSIS — E78.00 HYPERCHOLESTEREMIA: ICD-10-CM

## 2024-03-13 DIAGNOSIS — R06.00 DYSPNEA, UNSPECIFIED TYPE: Primary | ICD-10-CM

## 2024-03-13 DIAGNOSIS — M51.16 LUMBAR DISC DISEASE WITH RADICULOPATHY: ICD-10-CM

## 2024-03-13 DIAGNOSIS — I10 PRIMARY HYPERTENSION: ICD-10-CM

## 2024-03-13 DIAGNOSIS — F31.78 BIPOLAR DISORDER, IN FULL REMISSION, MOST RECENT EPISODE MIXED (HCC): ICD-10-CM

## 2024-03-13 DIAGNOSIS — F10.21 ALCOHOL USE DISORDER, SEVERE, IN SUSTAINED REMISSION (HCC): ICD-10-CM

## 2024-03-13 DIAGNOSIS — J44.9 CHRONIC OBSTRUCTIVE PULMONARY DISEASE, UNSPECIFIED COPD TYPE (HCC): ICD-10-CM

## 2024-03-13 DIAGNOSIS — I25.118 CORONARY ARTERY DISEASE INVOLVING NATIVE CORONARY ARTERY OF NATIVE HEART WITH OTHER FORM OF ANGINA PECTORIS (HCC): ICD-10-CM

## 2024-03-13 PROCEDURE — G8484 FLU IMMUNIZE NO ADMIN: HCPCS | Performed by: INTERNAL MEDICINE

## 2024-03-13 PROCEDURE — G8427 DOCREV CUR MEDS BY ELIG CLIN: HCPCS | Performed by: INTERNAL MEDICINE

## 2024-03-13 PROCEDURE — 99214 OFFICE O/P EST MOD 30 MIN: CPT | Performed by: INTERNAL MEDICINE

## 2024-03-13 PROCEDURE — G8417 CALC BMI ABV UP PARAM F/U: HCPCS | Performed by: INTERNAL MEDICINE

## 2024-03-13 PROCEDURE — 3023F SPIROM DOC REV: CPT | Performed by: INTERNAL MEDICINE

## 2024-03-13 PROCEDURE — 3017F COLORECTAL CA SCREEN DOC REV: CPT | Performed by: INTERNAL MEDICINE

## 2024-03-13 PROCEDURE — 1036F TOBACCO NON-USER: CPT | Performed by: INTERNAL MEDICINE

## 2024-03-13 PROCEDURE — 3078F DIAST BP <80 MM HG: CPT | Performed by: INTERNAL MEDICINE

## 2024-03-13 PROCEDURE — 3074F SYST BP LT 130 MM HG: CPT | Performed by: INTERNAL MEDICINE

## 2024-03-13 RX ORDER — PREDNISONE 20 MG/1
TABLET ORAL
Qty: 10 TABLET | Refills: 0 | Status: SHIPPED | OUTPATIENT
Start: 2024-03-13

## 2024-03-13 RX ORDER — FUROSEMIDE 20 MG/1
20 TABLET ORAL EVERY OTHER DAY
COMMUNITY
Start: 2024-02-05

## 2024-03-13 RX ORDER — LISINOPRIL 5 MG/1
5 TABLET ORAL DAILY
COMMUNITY
Start: 2024-02-05

## 2024-03-13 ASSESSMENT — PATIENT HEALTH QUESTIONNAIRE - PHQ9
2. FEELING DOWN, DEPRESSED OR HOPELESS: 0
10. IF YOU CHECKED OFF ANY PROBLEMS, HOW DIFFICULT HAVE THESE PROBLEMS MADE IT FOR YOU TO DO YOUR WORK, TAKE CARE OF THINGS AT HOME, OR GET ALONG WITH OTHER PEOPLE: 0
SUM OF ALL RESPONSES TO PHQ QUESTIONS 1-9: 0
6. FEELING BAD ABOUT YOURSELF - OR THAT YOU ARE A FAILURE OR HAVE LET YOURSELF OR YOUR FAMILY DOWN: 0
SUM OF ALL RESPONSES TO PHQ QUESTIONS 1-9: 0
3. TROUBLE FALLING OR STAYING ASLEEP: 0
4. FEELING TIRED OR HAVING LITTLE ENERGY: 0
1. LITTLE INTEREST OR PLEASURE IN DOING THINGS: 0
SUM OF ALL RESPONSES TO PHQ QUESTIONS 1-9: 0
7. TROUBLE CONCENTRATING ON THINGS, SUCH AS READING THE NEWSPAPER OR WATCHING TELEVISION: 0
SUM OF ALL RESPONSES TO PHQ9 QUESTIONS 1 & 2: 0
5. POOR APPETITE OR OVEREATING: 0
SUM OF ALL RESPONSES TO PHQ QUESTIONS 1-9: 0
9. THOUGHTS THAT YOU WOULD BE BETTER OFF DEAD, OR OF HURTING YOURSELF: 0
8. MOVING OR SPEAKING SO SLOWLY THAT OTHER PEOPLE COULD HAVE NOTICED. OR THE OPPOSITE, BEING SO FIGETY OR RESTLESS THAT YOU HAVE BEEN MOVING AROUND A LOT MORE THAN USUAL: 0

## 2024-03-13 NOTE — PROGRESS NOTES
for 3 days    Lumbar disc disease with radiculopathy on gabapentin  worsening   -     Amb External Referral To Pain Medicine   -     CBC with Auto Differential; Future  -     predniSONE (DELTASONE) 20 MG tablet; 2 pills daily for 3 days, 1 pill daily for 3 days and 1/2 daily for 3 days    Primary hypertension controlled   -     Comprehensive Metabolic Panel; Future    Hypercholesteremia  -     Comprehensive Metabolic Panel; Future  -     Lipid Panel; Future    Bipolar disorder, in full remission, most recent episode mixed (HCC) on cymbalta     chronic insomnia on ambien and oarrs reviewed     Alcohol use disorder, severe, in sustained remission (HCC) none       Orders Placed This Encounter   Medications    Budeson-Glycopyrrol-Formoterol 160-9-4.8 MCG/ACT AERO     Sig: Inhale 2 puffs into the lungs in the morning and at bedtime     Dispense:  1 each     Refill:  5    predniSONE (DELTASONE) 20 MG tablet     Si pills daily for 3 days, 1 pill daily for 3 days and 1/2 daily for 3 days     Dispense:  10 tablet     Refill:  0        Return in about 6 months (around 2024), or if symptoms worsen or fail to improve.     There are no Patient Instructions on file for this visit.

## 2024-03-14 ENCOUNTER — TELEPHONE (OUTPATIENT)
Dept: FAMILY MEDICINE CLINIC | Age: 64
End: 2024-03-14

## 2024-03-14 NOTE — TELEPHONE ENCOUNTER
[facility] 160-9-4.8MC[facility]ACT aerosol  VIA CMM Key: [id number] STATUS PENDING.      FOLLOW UP DONE DAILY: IF NO RESPONSE IN 3 DAYS WE WILL REFAX FOR STATUS CHECK. IF ANOTHER 3 DAYS GOES BY WITH NO RESPONSE WILL CALL INSURANCE FOR STATUS.

## 2024-03-16 ASSESSMENT — ENCOUNTER SYMPTOMS
SHORTNESS OF BREATH: 1
ABDOMINAL PAIN: 0
VOMITING: 0
BACK PAIN: 1
NAUSEA: 0
COUGH: 0
BLOOD IN STOOL: 0
DIARRHEA: 0

## 2024-03-22 DIAGNOSIS — R25.2 LEG CRAMPS: ICD-10-CM

## 2024-03-25 RX ORDER — MELATONIN
2 DAILY
Qty: 180 TABLET | Refills: 1 | Status: SHIPPED | OUTPATIENT
Start: 2024-03-25

## 2024-04-24 ENCOUNTER — TELEPHONE (OUTPATIENT)
Dept: FAMILY MEDICINE CLINIC | Age: 64
End: 2024-04-24

## 2024-04-24 NOTE — TELEPHONE ENCOUNTER
----- Message from Ajay Phillips sent at 4/24/2024  2:22 PM EDT -----  Regarding: ECC Results Request  ECC Results Request    Which lab or imaging result is the patient calling about:    Which provider ordered the test?    Was this a Non-[facility] Provider: no    Date the test was preformed (MM/SERGIO/YYYY): not provide  --------------------------------------------------------------------------------------------------------------------------    Relationship to Patient: Self     Call Back Info: OK to leave message on voicemail  Preferred Call Back Number: Phone  [phone] and Fax Number: [phone]    Patient requesting a MRI Result wanted to send on his Fax Number.     Not applicable

## 2024-05-29 ENCOUNTER — TELEPHONE (OUTPATIENT)
Dept: FAMILY MEDICINE CLINIC | Age: 64
End: 2024-05-29

## 2024-05-29 NOTE — TELEPHONE ENCOUNTER
Spoke with the pt, the police did have the pt do a line test and failed, he told them that he has neuropathy in his legs and feet and wouldn't be able to do the test. No breathalyzer was done per pt. He told them he does not drink.   Pt was taken to [facility] in [place], [place] to do a blood test but he has not heard anything on the results. This happened about 1 1/2-2 months ago.

## 2024-05-29 NOTE — TELEPHONE ENCOUNTER
Patient calling to see if you can help him out, he was in [facility] 2 to 3 weeks ago. He was pulled over by the police and they are trying to charge him with DUI. He states he was not drinking, he can't drink like he used to.  He is asking if you can write him a letter stating he has neuropathy. Pt was seen in the office on 03/13 where it was discussed that he did have neuropathy and severe pain in his legs. Pt states this was the issue when he was driving. If you can write him something stating that it is in fact a medical condition that he has, that could help him.     Please advise, [phone]

## 2024-05-30 ENCOUNTER — TELEPHONE (OUTPATIENT)
Dept: FAMILY MEDICINE CLINIC | Age: 64
End: 2024-05-30

## 2024-05-30 NOTE — TELEPHONE ENCOUNTER
Spoke with the pt informed him that Dr Blood wrote a letter that you have neuropathy which will affect a field sobriety test. Pt will have his sister Jeni Keith pick it up today.  Letter will be at the  for .

## 2024-05-30 NOTE — TELEPHONE ENCOUNTER
----- Message from Ismael Jones sent at 5/30/2024  9:53 AM EDT -----  Regarding: ECC Message to Provider  ECC Message to Provider    Relationship to Patient: Self     Additional Information: Patient was advised from [address] [place] [address] to have a contact with the patient's PCP so they can get contact with regarding about the records of the patients lab work done.     [address] [place] [address] [place] tele# [phone]  --------------------------------------------------------------------------------------------------------------------------    Call Back Information: OK to leave message on voicemail  Preferred Call Back Number: Phone  [phone]

## 2024-05-30 NOTE — TELEPHONE ENCOUNTER
I can write a letter that you have neuropathy which will affect a field [place] variety test.Not sure why you wer stopped by police in the first place   Letter completed not sure where to send

## (undated) DEVICE — PROCEDURE KIT ENDOSCP CUST

## (undated) DEVICE — GOWN AURORA NONREINF LG: Brand: MEDLINE INDUSTRIES, INC.

## (undated) DEVICE — BW-412T DISP COMBO CLEANING BRUSH: Brand: SINGLE USE COMBINATION CLEANING BRUSH

## (undated) DEVICE — SOLUTION IV IRRIG WATER 500ML POUR BRL ST 2F7113